# Patient Record
Sex: MALE | Race: WHITE | Employment: OTHER | URBAN - METROPOLITAN AREA
[De-identification: names, ages, dates, MRNs, and addresses within clinical notes are randomized per-mention and may not be internally consistent; named-entity substitution may affect disease eponyms.]

---

## 2022-02-24 ENCOUNTER — OUTSIDE SERVICES (OUTPATIENT)
Dept: NEUROLOGY | Age: 77
End: 2022-02-24
Payer: COMMERCIAL

## 2022-02-24 DIAGNOSIS — M54.16 LUMBAR RADICULOPATHY: ICD-10-CM

## 2022-02-24 PROCEDURE — 95886 MUSC TEST DONE W/N TEST COMP: CPT | Performed by: PSYCHIATRY & NEUROLOGY

## 2022-02-24 PROCEDURE — 95912 NRV CNDJ TEST 11-12 STUDIES: CPT | Performed by: PSYCHIATRY & NEUROLOGY

## 2023-04-12 ENCOUNTER — TELEPHONE (OUTPATIENT)
Dept: PRIMARY CARE | Facility: CLINIC | Age: 78
End: 2023-04-12
Payer: MEDICARE

## 2023-04-12 NOTE — TELEPHONE ENCOUNTER
Patient is out of medication.   Please assist. LVM on reception line.     CB to: [phone]   Thanks!

## 2023-04-24 RX ORDER — DICLOFENAC SODIUM 10 MG/G
GEL TOPICAL
COMMUNITY
Start: 2022-09-08 | End: 2023-12-14 | Stop reason: WASHOUT

## 2023-04-24 RX ORDER — LEVOFLOXACIN 250 MG/1
1 TABLET ORAL DAILY
COMMUNITY
Start: 2022-06-02 | End: 2023-06-07 | Stop reason: ALTCHOICE

## 2023-04-24 RX ORDER — CYCLOBENZAPRINE HCL 5 MG
TABLET ORAL 3 TIMES DAILY PRN
COMMUNITY
Start: 2022-12-14 | End: 2023-06-07 | Stop reason: ALTCHOICE

## 2023-04-24 RX ORDER — TAMSULOSIN HYDROCHLORIDE 0.4 MG/1
1 CAPSULE ORAL DAILY
COMMUNITY
Start: 2021-09-29 | End: 2023-06-07 | Stop reason: SDUPTHER

## 2023-04-24 RX ORDER — CARBIDOPA AND LEVODOPA 25; 100 MG/1; MG/1
TABLET ORAL
COMMUNITY
End: 2023-12-14 | Stop reason: WASHOUT

## 2023-04-24 RX ORDER — FINASTERIDE 5 MG/1
5 TABLET, FILM COATED ORAL DAILY
COMMUNITY
End: 2023-06-07 | Stop reason: SDUPTHER

## 2023-04-24 RX ORDER — MAGNESIUM HYDROXIDE 2400 MG/10ML
10 SUSPENSION ORAL NIGHTLY PRN
COMMUNITY
Start: 2022-12-14 | End: 2023-06-07 | Stop reason: ALTCHOICE

## 2023-04-24 RX ORDER — ALUMINUM HYDROXIDE, MAGNESIUM HYDROXIDE, AND SIMETHICONE 1200; 120; 1200 MG/30ML; MG/30ML; MG/30ML
30 SUSPENSION ORAL EVERY 6 HOURS PRN
COMMUNITY
Start: 2022-12-14 | End: 2023-06-07 | Stop reason: ALTCHOICE

## 2023-04-24 RX ORDER — DOCUSATE SODIUM 100 MG/1
100 CAPSULE, LIQUID FILLED ORAL 2 TIMES DAILY
COMMUNITY
End: 2023-06-07 | Stop reason: ALTCHOICE

## 2023-04-24 RX ORDER — OMEPRAZOLE 40 MG/1
CAPSULE, DELAYED RELEASE ORAL
COMMUNITY
End: 2023-06-07 | Stop reason: ALTCHOICE

## 2023-04-24 RX ORDER — TRAMADOL HYDROCHLORIDE 50 MG/1
1 TABLET ORAL
COMMUNITY
Start: 2023-01-04 | End: 2023-06-07 | Stop reason: ALTCHOICE

## 2023-04-24 RX ORDER — ENOXAPARIN SODIUM 100 MG/ML
40 INJECTION SUBCUTANEOUS
COMMUNITY
Start: 2022-06-02 | End: 2023-06-07 | Stop reason: ALTCHOICE

## 2023-04-24 RX ORDER — ACETAMINOPHEN 500 MG
1000 TABLET ORAL 3 TIMES DAILY
COMMUNITY
End: 2023-12-14 | Stop reason: WASHOUT

## 2023-04-24 RX ORDER — PANTOPRAZOLE SODIUM 40 MG/1
40 TABLET, DELAYED RELEASE ORAL DAILY
COMMUNITY
End: 2023-06-07 | Stop reason: ALTCHOICE

## 2023-04-24 RX ORDER — GABAPENTIN 100 MG/1
200 CAPSULE ORAL NIGHTLY
COMMUNITY
End: 2023-12-14 | Stop reason: WASHOUT

## 2023-04-24 RX ORDER — LIDOCAINE 50 MG/G
PATCH TOPICAL
COMMUNITY
Start: 2022-12-29 | End: 2023-06-07 | Stop reason: ALTCHOICE

## 2023-04-24 RX ORDER — RIVAROXABAN 10 MG/1
TABLET, FILM COATED ORAL
COMMUNITY
End: 2023-06-07 | Stop reason: ALTCHOICE

## 2023-04-24 RX ORDER — ACETAMINOPHEN 325 MG/1
TABLET, CHEWABLE ORAL EVERY 4 HOURS PRN
COMMUNITY
End: 2023-06-07 | Stop reason: ALTCHOICE

## 2023-04-24 RX ORDER — DOXAZOSIN 2 MG/1
1 TABLET ORAL DAILY
COMMUNITY
Start: 2019-03-20 | End: 2023-12-14 | Stop reason: WASHOUT

## 2023-05-22 RX ORDER — CARBIDOPA AND LEVODOPA 25; 100 MG/1; MG/1
TABLET ORAL
Qty: 135 TABLET | Refills: 0 | OUTPATIENT
Start: 2023-05-22

## 2023-05-22 RX ORDER — GABAPENTIN 100 MG/1
100 CAPSULE ORAL NIGHTLY
Qty: 60 CAPSULE | Refills: 0 | OUTPATIENT
Start: 2023-05-22

## 2023-05-22 RX ORDER — FINASTERIDE 5 MG/1
5 TABLET, FILM COATED ORAL DAILY
Qty: 30 TABLET | Refills: 0 | OUTPATIENT
Start: 2023-05-22

## 2023-05-22 NOTE — TELEPHONE ENCOUNTER
Pt did schedule follow up with you and per daughter in law (Dilma) you told her  you would fill these ?   I only did 30 day supply until appt  Please Advise

## 2023-05-31 ENCOUNTER — APPOINTMENT (OUTPATIENT)
Dept: PRIMARY CARE | Facility: CLINIC | Age: 78
End: 2023-05-31
Payer: MEDICARE

## 2023-06-07 ENCOUNTER — OFFICE VISIT (OUTPATIENT)
Dept: PRIMARY CARE | Facility: CLINIC | Age: 78
End: 2023-06-07
Payer: MEDICARE

## 2023-06-07 VITALS
BODY MASS INDEX: 25.63 KG/M2 | TEMPERATURE: 97 F | HEART RATE: 96 BPM | OXYGEN SATURATION: 98 % | SYSTOLIC BLOOD PRESSURE: 124 MMHG | WEIGHT: 154 LBS | DIASTOLIC BLOOD PRESSURE: 82 MMHG

## 2023-06-07 DIAGNOSIS — Z00.00 ROUTINE GENERAL MEDICAL EXAMINATION AT HEALTH CARE FACILITY: Primary | ICD-10-CM

## 2023-06-07 DIAGNOSIS — R53.83 OTHER FATIGUE: ICD-10-CM

## 2023-06-07 DIAGNOSIS — R29.2 HYPERREFLEXIA: ICD-10-CM

## 2023-06-07 DIAGNOSIS — R63.4 ABNORMAL WEIGHT LOSS: ICD-10-CM

## 2023-06-07 DIAGNOSIS — N13.8 BENIGN PROSTATIC HYPERPLASIA WITH URINARY OBSTRUCTION: ICD-10-CM

## 2023-06-07 DIAGNOSIS — G20.A1 PARKINSON'S DISEASE (MULTI): ICD-10-CM

## 2023-06-07 DIAGNOSIS — R97.20 ELEVATED PSA: ICD-10-CM

## 2023-06-07 DIAGNOSIS — N40.1 BENIGN PROSTATIC HYPERPLASIA WITH URINARY OBSTRUCTION: ICD-10-CM

## 2023-06-07 DIAGNOSIS — D50.8 OTHER IRON DEFICIENCY ANEMIA: ICD-10-CM

## 2023-06-07 DIAGNOSIS — D09.8 CARCINOMA IN SITU OF OTHER SPECIFIED SITES: ICD-10-CM

## 2023-06-07 DIAGNOSIS — Z23 NEED FOR VACCINATION: ICD-10-CM

## 2023-06-07 PROBLEM — R07.81 RIB PAIN ON RIGHT SIDE: Status: ACTIVE | Noted: 2023-06-07

## 2023-06-07 PROBLEM — N40.0 BENIGN PROSTATIC HYPERPLASIA: Status: ACTIVE | Noted: 2023-06-07

## 2023-06-07 PROBLEM — M25.511 RIGHT SHOULDER PAIN: Status: ACTIVE | Noted: 2023-06-07

## 2023-06-07 PROBLEM — D64.9 ANEMIA: Status: ACTIVE | Noted: 2023-06-07

## 2023-06-07 PROBLEM — M41.9 SCOLIOSIS: Status: ACTIVE | Noted: 2022-03-07

## 2023-06-07 PROBLEM — Z96.659 HISTORY OF TOTAL KNEE REPLACEMENT: Status: ACTIVE | Noted: 2022-08-03

## 2023-06-07 PROBLEM — R29.898 LEFT LEG WEAKNESS: Status: ACTIVE | Noted: 2023-06-07

## 2023-06-07 PROBLEM — N41.0 ACUTE PROSTATITIS: Status: ACTIVE | Noted: 2023-06-07

## 2023-06-07 PROBLEM — Z99.3 DEPENDENCE ON WHEELCHAIR: Status: ACTIVE | Noted: 2022-03-07

## 2023-06-07 PROBLEM — I74.9 THROMBOEMBOLISM (MULTI): Status: ACTIVE | Noted: 2023-06-07

## 2023-06-07 PROBLEM — D17.9 LIPOMA: Status: ACTIVE | Noted: 2023-06-07

## 2023-06-07 PROBLEM — E61.1 LOW IRON: Status: ACTIVE | Noted: 2023-06-07

## 2023-06-07 PROBLEM — M25.572 LEFT ANKLE PAIN: Status: ACTIVE | Noted: 2023-06-07

## 2023-06-07 PROBLEM — R26.81 GAIT INSTABILITY: Status: ACTIVE | Noted: 2023-06-07

## 2023-06-07 PROBLEM — S22.32XA FRACTURE OF RIB OF LEFT SIDE: Status: ACTIVE | Noted: 2023-06-07

## 2023-06-07 PROBLEM — T14.8XXA ABRASION: Status: ACTIVE | Noted: 2023-06-07

## 2023-06-07 PROBLEM — M17.11 OSTEOARTHRITIS OF RIGHT KNEE: Status: ACTIVE | Noted: 2022-07-12

## 2023-06-07 PROBLEM — L03.90 CELLULITIS: Status: ACTIVE | Noted: 2023-06-07

## 2023-06-07 PROBLEM — G25.81 RESTLESS LEGS SYNDROME: Status: ACTIVE | Noted: 2022-03-07

## 2023-06-07 PROBLEM — M47.12 CERVICAL SPONDYLOSIS WITH MYELOPATHY: Status: ACTIVE | Noted: 2022-04-18

## 2023-06-07 PROBLEM — R79.89 ELEVATED D-DIMER: Status: ACTIVE | Noted: 2023-06-07

## 2023-06-07 PROBLEM — R77.8 ABNORMAL SERUM PROTEIN TEST: Status: ACTIVE | Noted: 2023-06-07

## 2023-06-07 PROBLEM — M48.02 STENOSIS, CERVICAL SPINE: Status: ACTIVE | Noted: 2023-06-07

## 2023-06-07 PROBLEM — M48.061 SPINAL STENOSIS OF LUMBAR REGION: Status: ACTIVE | Noted: 2022-03-02

## 2023-06-07 PROBLEM — G95.89 MYELOMALACIA (MULTI): Status: ACTIVE | Noted: 2023-06-07

## 2023-06-07 PROBLEM — M54.50 LOW BACK PAIN: Status: ACTIVE | Noted: 2023-06-07

## 2023-06-07 PROBLEM — S22.49XA MULTIPLE RIB FRACTURES: Status: ACTIVE | Noted: 2023-06-07

## 2023-06-07 PROBLEM — M25.569 JOINT PAIN, KNEE: Status: ACTIVE | Noted: 2023-06-07

## 2023-06-07 PROBLEM — R26.9 GAIT DISTURBANCE: Status: ACTIVE | Noted: 2023-06-07

## 2023-06-07 PROBLEM — R20.0 LEFT LEG NUMBNESS: Status: ACTIVE | Noted: 2023-06-07

## 2023-06-07 PROCEDURE — 1160F RVW MEDS BY RX/DR IN RCRD: CPT | Performed by: FAMILY MEDICINE

## 2023-06-07 PROCEDURE — 1157F ADVNC CARE PLAN IN RCRD: CPT | Performed by: FAMILY MEDICINE

## 2023-06-07 PROCEDURE — 1036F TOBACCO NON-USER: CPT | Performed by: FAMILY MEDICINE

## 2023-06-07 PROCEDURE — G0439 PPPS, SUBSEQ VISIT: HCPCS | Performed by: FAMILY MEDICINE

## 2023-06-07 PROCEDURE — 1170F FXNL STATUS ASSESSED: CPT | Performed by: FAMILY MEDICINE

## 2023-06-07 PROCEDURE — 1159F MED LIST DOCD IN RCRD: CPT | Performed by: FAMILY MEDICINE

## 2023-06-07 PROCEDURE — 99214 OFFICE O/P EST MOD 30 MIN: CPT | Performed by: FAMILY MEDICINE

## 2023-06-07 RX ORDER — POLYETHYLENE GLYCOL 3350 17 G/17G
17 POWDER, FOR SOLUTION ORAL DAILY
COMMUNITY
End: 2023-12-14 | Stop reason: WASHOUT

## 2023-06-07 RX ORDER — FINASTERIDE 5 MG/1
5 TABLET, FILM COATED ORAL DAILY
Qty: 90 TABLET | Refills: 3 | Status: SHIPPED | OUTPATIENT
Start: 2023-06-07

## 2023-06-07 RX ORDER — CARBIDOPA AND LEVODOPA 25; 100 MG/1; MG/1
TABLET ORAL
Qty: 90 TABLET | Refills: 3 | Status: CANCELLED | OUTPATIENT
Start: 2023-06-07

## 2023-06-07 RX ORDER — HYDROGEN PEROXIDE 3 %
20 SOLUTION, NON-ORAL MISCELLANEOUS
COMMUNITY
End: 2023-12-14 | Stop reason: WASHOUT

## 2023-06-07 RX ORDER — LIDOCAINE 50 MG/G
1 PATCH TOPICAL DAILY
COMMUNITY
End: 2023-12-14 | Stop reason: WASHOUT

## 2023-06-07 RX ORDER — GABAPENTIN 100 MG/1
200 CAPSULE ORAL NIGHTLY
Qty: 180 CAPSULE | Refills: 3 | Status: CANCELLED | OUTPATIENT
Start: 2023-06-07

## 2023-06-07 RX ORDER — LANOLIN ALCOHOL/MO/W.PET/CERES
400 CREAM (GRAM) TOPICAL DAILY
Qty: 90 TABLET | Refills: 2 | Status: SHIPPED | OUTPATIENT
Start: 2023-06-07 | End: 2024-01-25 | Stop reason: SDUPTHER

## 2023-06-07 RX ORDER — SENNOSIDES 8.6 MG/1
1 TABLET ORAL DAILY
COMMUNITY
End: 2023-12-14 | Stop reason: WASHOUT

## 2023-06-07 RX ORDER — MELATONIN 3 MG
CAPSULE ORAL
COMMUNITY

## 2023-06-07 RX ORDER — TAMSULOSIN HYDROCHLORIDE 0.4 MG/1
0.4 CAPSULE ORAL DAILY
Qty: 90 CAPSULE | Refills: 3 | Status: SHIPPED | OUTPATIENT
Start: 2023-06-07

## 2023-06-07 ASSESSMENT — ENCOUNTER SYMPTOMS
TREMORS: 1
UNEXPECTED WEIGHT CHANGE: 1
DIFFICULTY URINATING: 1
OCCASIONAL FEELINGS OF UNSTEADINESS: 1
DEPRESSION: 0
BACK PAIN: 1
LOSS OF SENSATION IN FEET: 1

## 2023-06-07 ASSESSMENT — ACTIVITIES OF DAILY LIVING (ADL)
TAKING_MEDICATION: INDEPENDENT
MANAGING_FINANCES: INDEPENDENT
BATHING: INDEPENDENT
DOING_HOUSEWORK: INDEPENDENT
GROCERY_SHOPPING: INDEPENDENT
DRESSING: INDEPENDENT

## 2023-06-07 ASSESSMENT — PATIENT HEALTH QUESTIONNAIRE - PHQ9
2. FEELING DOWN, DEPRESSED OR HOPELESS: NOT AT ALL
1. LITTLE INTEREST OR PLEASURE IN DOING THINGS: NOT AT ALL
SUM OF ALL RESPONSES TO PHQ9 QUESTIONS 1 AND 2: 0
1. LITTLE INTEREST OR PLEASURE IN DOING THINGS: NOT AT ALL
2. FEELING DOWN, DEPRESSED OR HOPELESS: NOT AT ALL
SUM OF ALL RESPONSES TO PHQ9 QUESTIONS 1 AND 2: 0

## 2023-06-07 NOTE — ASSESSMENT & PLAN NOTE
Stop meds and off for 3 to 4 weeks and has tremor but able to control with his mind and wondering about taking meds advised to hold off until seen by neurology for

## 2023-06-07 NOTE — PATIENT INSTRUCTIONS
Please consider exercise program involving walking or some other form of aerobic activity 5 days weekly for 30 minutes... Let's also consider strengthening of large muscle groups like the abdominal muscles or shoulder muscles... Twice weekly with reps of 5/10/15 exercises and gradually increase strength.. This is not heavy strength training but light weight training... Sit ups or back exercise routine.. Please ask for handout if uncertain how to do..This  will help to strengthen your muscles which in turn will help you to lose weight.... You might ask what is the best diet available.. I would strongly encourage you to consider  Weight Watchers.. And as  your  fellow on  Weight Watchers physician attempting to  live this  LIFE  style  choice with you....  I will be glad to give you recommendations on what to eat.. Consider buying Migdalia bread.., trevor bagle thin bread.. oikos yogurt... eggs  to eat as hard-boiled... Halo top ice cream for snack... All these are delicious foods which.. when eaten and  being compliant eating three  meals daily  breakfast lunch and dinner, drinking  64 ounces of water daily we will all win together !!!!!!!. This will be a means for you to lose weight... Consider also the smart phone reina ... My plate.. Or My  fitness  pal..,  as additional possibilities for weight loss... Good  luccraig Loya!    Discussed medication side effects.  The  risk benefits and treatment options  discussed with patient.       Please schedule follow-up appointment based upon your improvement/failure to improve/chronic medical conditions and physician recommendations during office appointment at the .       Patient advised to go to er if symptoms worsen or to call answering service, or to return to office for additional evaluation    This note was partially  generated using Dragon voice recognition and there may be incorrect words, wording, spelling, or pronunciation errors that were not  corrected prior to committing the note to the medical record.   Review of testing 3/8/2022 shows CMP remarkable for slightly decreased protein of 5.6.  CBC with differential showed hemoglobin decreased at 12.4 hematocrit 36.7 WBC 6.7    CK3 2270 1 AM within normal limits    CT of the pelvis with IV contrast dated 12/22 showed marked enlargement again of the prostate measuring 8.8 x 8.0 cm with lobular contours and mass effect on the adjacent urinary bladder    CT of the head without contrast dated 8/22 showed mild prominence of the CSF space may be related to a small arachnoid cyst moderate generalized brain parenchymal volume loss with mildly enlarged ventricles patchy and confluent foci of white matter most likely moderate chronic microvascular angiopathy no acute intracranial abnormalities.    CT of the chest for pulmonary embolism dated 5/22 showed normal heart thoracic aorta normal small hiatal hernia mild thickening of distal esophagus fatty liver morphology thickening of the mid and distal esophagus

## 2023-06-07 NOTE — PROGRESS NOTES
"Subjective   Reason for Visit: Marco Antonio Merlos Jr. is an 78 y.o. male here for a Medicare Wellness visit.     Past Medical, Surgical, and Family History reviewed and updated in chart.    Reviewed all medications by prescribing practitioner or clinical pharmacist (such as prescriptions, OTCs, herbal therapies and supplements) and documented in the medical record.     management.  Consent now okay last PSA performed 9/21 with level of 16.  He states he has been on finasteride and it has helped.  Tremor    The tremor affects the left hand. The tremor occurs all day. The tremor is alleviated by resting the extremity.    Additional narrative: Had neurologist but did not keep appointment  He has BPH and finasteride has helped immensely.  Has not seen urology in a long time.    Patient Care Team:  Santhosh Loya DO as PCP - General  Santhosh Loya DO as PCP - [facility] Attributed Provider     Review of Systems   Constitutional:  Positive for unexpected weight change.   Genitourinary:  Positive for difficulty urinating.   Musculoskeletal:  Positive for back pain.   Neurological:  Positive for tremors.       Objective   Vitals:  /82   Pulse 96   Temp 36.1 °C (97 °F)   Resp (P) 18   Ht (P) 1.651 m (5' 5\")   Wt 69.9 kg (154 lb)   SpO2 98%   BMI (P) 25.63 kg/m²       Physical Exam  Vitals: I have reviewed the vitals  General: Well-developed.  In no acute distress.  Eyes:   sclera nonicteric.  Conjunctiva not injected.  No discharge.   HEAD: Normocephalic, atraumatic.  HEENT   Mucous membranes moist.  Posterior oropharynx nonerythematous, no tonsillar exudates.      No cervical lymphadenopathy.  Cardio: Regular rate and rhythm.  No murmur, rub or gallop.  Pulmonary: Lungs clear to auscultation in all fields.  No accessory muscle use.  GI/: Normal active bowel sounds.  Soft, nontender.  No masses or organomegaly appreciated.  Musculoskeletal: No gross deformities appreciated.  Neuro: Alert, age-appropriate.  " Normal muscle tone.  Moving all extremities.  Skin: No rash, bruises or lesions.     Review of testing 3/8/2022 shows CMP remarkable for slightly decreased protein of 5.6.  CBC with differential showed hemoglobin decreased at 12.4 hematocrit 36.7 WBC 6.7    CK3 2270 1 AM within normal limits    CT of the pelvis with IV contrast dated 12/22 showed marked enlargement again of the prostate measuring 8.8 x 8.0 cm with lobular contours and mass effect on the adjacent urinary bladder    CT of the head without contrast dated 8/22 showed mild prominence of the CSF space may be related to a small arachnoid cyst moderate generalized brain parenchymal volume loss with mildly enlarged ventricles patchy and confluent foci of white matter most likely moderate chronic microvascular angiopathy no acute intracranial abnormalities.    CT of the chest for pulmonary embolism dated 5/22 showed normal heart thoracic aorta normal small hiatal hernia mild thickening of distal esophagus fatty liver morphology thickening of the mid and distal esophagus  Assessment/Plan   Problem List Items Addressed This Visit          Nervous    Parkinson's disease (CMS/HCC)    Current Assessment & Plan     Stop meds and off for 3 to 4 weeks and has tremor but able to control with his mind and wondering about taking meds advised to hold off until seen by neurology for            Genitourinary    Benign prostatic hyperplasia    Elevated PSA    Current Assessment & Plan     Results of PET scan showed a large prostate 8 x 8 cm PSA elevated at 16 we will recheck PSA advised patient to see urology for work-up question prostate cancer            Hematologic    Anemia    Current Assessment & Plan     We will check iron studies anemia noted does have thickening noted on CAT scan of esophagus uncertain if malignancy will refer to GI for opinion          Other Visit Diagnoses       Routine general medical examination at health care facility    -  Primary    Abnormal  weight loss        Carcinoma in situ of other specified sites        Need for vaccination        Relevant Medications    zoster vaccine-recombinant adjuvanted (Shingrix) 50 mcg/0.5 mL vaccine        Muscle cramps prescription for magnesium sent to pharmacy prescription given for shingles vaccine

## 2023-06-07 NOTE — ASSESSMENT & PLAN NOTE
Results of PET scan showed a large prostate 8 x 8 cm PSA elevated at 16 we will recheck PSA advised patient to see urology for work-up question prostate cancer

## 2023-06-07 NOTE — ASSESSMENT & PLAN NOTE
We will check iron studies anemia noted does have thickening noted on CAT scan of esophagus uncertain if malignancy will refer to GI for opinion

## 2023-08-03 LAB — PROSTATE SPECIFIC AG (NG/ML) IN SER/PLAS: 8.57 NG/ML (ref 0–4)

## 2023-08-14 ENCOUNTER — TELEPHONE (OUTPATIENT)
Dept: PRIMARY CARE | Facility: CLINIC | Age: 78
End: 2023-08-14
Payer: MEDICARE

## 2023-08-22 ENCOUNTER — HOSPITAL ENCOUNTER (OUTPATIENT)
Dept: DATA CONVERSION | Facility: HOSPITAL | Age: 78
End: 2023-08-22
Attending: INTERNAL MEDICINE | Admitting: INTERNAL MEDICINE
Payer: MEDICARE

## 2023-08-22 DIAGNOSIS — R12 HEARTBURN: ICD-10-CM

## 2023-08-22 DIAGNOSIS — K44.9 DIAPHRAGMATIC HERNIA WITHOUT OBSTRUCTION OR GANGRENE: ICD-10-CM

## 2023-08-22 DIAGNOSIS — K21.00 GASTRO-ESOPHAGEAL REFLUX DISEASE WITH ESOPHAGITIS, WITHOUT BLEEDING: ICD-10-CM

## 2023-08-22 DIAGNOSIS — K31.89 OTHER DISEASES OF STOMACH AND DUODENUM: ICD-10-CM

## 2023-08-28 LAB
COMPLETE PATHOLOGY REPORT: NORMAL
CONVERTED CLINICAL DIAGNOSIS-HISTORY: NORMAL
CONVERTED FINAL DIAGNOSIS: NORMAL
CONVERTED FINAL REPORT PDF LINK TO COPY AND PASTE: NORMAL
CONVERTED GROSS DESCRIPTION: NORMAL

## 2023-09-29 VITALS — BODY MASS INDEX: 27.59 KG/M2 | WEIGHT: 149.91 LBS | HEIGHT: 62 IN

## 2023-11-22 PROBLEM — Z96.651 STATUS POST TOTAL RIGHT KNEE REPLACEMENT: Status: ACTIVE | Noted: 2022-08-03

## 2023-11-22 PROBLEM — M62.81 QUADRICEPS WEAKNESS: Status: ACTIVE | Noted: 2023-11-22

## 2023-11-22 PROBLEM — M48.061 LUMBAR SPINAL STENOSIS: Status: ACTIVE | Noted: 2023-11-22

## 2023-11-22 PROBLEM — S72.002A HIP FRACTURE, LEFT (MULTI): Status: ACTIVE | Noted: 2023-11-22

## 2023-11-22 PROBLEM — R13.10 DYSPHAGIA: Status: ACTIVE | Noted: 2023-11-22

## 2023-11-22 PROBLEM — M54.9 BACK PAIN: Status: ACTIVE | Noted: 2023-06-07

## 2023-11-22 PROBLEM — R93.3 ABNORMAL CT SCAN, ESOPHAGUS: Status: ACTIVE | Noted: 2023-11-22

## 2023-11-22 RX ORDER — OMEPRAZOLE 40 MG/1
CAPSULE, DELAYED RELEASE ORAL
COMMUNITY
Start: 2023-08-31 | End: 2023-12-14 | Stop reason: WASHOUT

## 2023-11-22 RX ORDER — MIRABEGRON 50 MG/1
1 TABLET, EXTENDED RELEASE ORAL DAILY
COMMUNITY
Start: 2023-08-03 | End: 2023-12-14 | Stop reason: WASHOUT

## 2023-11-22 RX ORDER — GABAPENTIN 300 MG/1
CAPSULE ORAL
COMMUNITY
End: 2023-12-14 | Stop reason: WASHOUT

## 2023-11-22 RX ORDER — RASAGILINE 1 MG/1
1 TABLET ORAL DAILY
COMMUNITY
Start: 2023-08-17 | End: 2023-12-14 | Stop reason: WASHOUT

## 2023-11-22 RX ORDER — TRAMADOL HYDROCHLORIDE 50 MG/1
1 TABLET ORAL EVERY 8 HOURS PRN
COMMUNITY
Start: 2023-01-04 | End: 2023-12-14 | Stop reason: WASHOUT

## 2023-12-14 ENCOUNTER — OFFICE VISIT (OUTPATIENT)
Dept: NEUROLOGY | Facility: CLINIC | Age: 78
End: 2023-12-14
Payer: MEDICARE

## 2023-12-14 VITALS
WEIGHT: 153 LBS | SYSTOLIC BLOOD PRESSURE: 119 MMHG | HEART RATE: 94 BPM | HEIGHT: 63 IN | DIASTOLIC BLOOD PRESSURE: 83 MMHG | BODY MASS INDEX: 27.11 KG/M2

## 2023-12-14 DIAGNOSIS — M48.061 SPINAL STENOSIS, LUMBAR REGION WITHOUT NEUROGENIC CLAUDICATION: ICD-10-CM

## 2023-12-14 DIAGNOSIS — G20.A1 PARKINSON'S DISEASE WITHOUT DYSKINESIA OR FLUCTUATING MANIFESTATIONS (MULTI): Primary | ICD-10-CM

## 2023-12-14 DIAGNOSIS — R20.0 LEFT LEG NUMBNESS: ICD-10-CM

## 2023-12-14 PROCEDURE — 1036F TOBACCO NON-USER: CPT | Performed by: STUDENT IN AN ORGANIZED HEALTH CARE EDUCATION/TRAINING PROGRAM

## 2023-12-14 PROCEDURE — 1160F RVW MEDS BY RX/DR IN RCRD: CPT | Performed by: STUDENT IN AN ORGANIZED HEALTH CARE EDUCATION/TRAINING PROGRAM

## 2023-12-14 PROCEDURE — 1125F AMNT PAIN NOTED PAIN PRSNT: CPT | Performed by: STUDENT IN AN ORGANIZED HEALTH CARE EDUCATION/TRAINING PROGRAM

## 2023-12-14 PROCEDURE — 99214 OFFICE O/P EST MOD 30 MIN: CPT | Performed by: STUDENT IN AN ORGANIZED HEALTH CARE EDUCATION/TRAINING PROGRAM

## 2023-12-14 PROCEDURE — 1159F MED LIST DOCD IN RCRD: CPT | Performed by: STUDENT IN AN ORGANIZED HEALTH CARE EDUCATION/TRAINING PROGRAM

## 2023-12-14 RX ORDER — DULOXETIN HYDROCHLORIDE 30 MG/1
CAPSULE, DELAYED RELEASE ORAL
Qty: 30 CAPSULE | Refills: 2 | Status: SHIPPED | OUTPATIENT
Start: 2023-12-14 | End: 2024-04-19

## 2023-12-14 ASSESSMENT — PATIENT HEALTH QUESTIONNAIRE - PHQ9
1. LITTLE INTEREST OR PLEASURE IN DOING THINGS: SEVERAL DAYS
SUM OF ALL RESPONSES TO PHQ9 QUESTIONS 1 & 2: 2
10. IF YOU CHECKED OFF ANY PROBLEMS, HOW DIFFICULT HAVE THESE PROBLEMS MADE IT FOR YOU TO DO YOUR WORK, TAKE CARE OF THINGS AT HOME, OR GET ALONG WITH OTHER PEOPLE: SOMEWHAT DIFFICULT
2. FEELING DOWN, DEPRESSED OR HOPELESS: SEVERAL DAYS

## 2023-12-14 ASSESSMENT — ENCOUNTER SYMPTOMS
DEPRESSION: 1
OCCASIONAL FEELINGS OF UNSTEADINESS: 1
LOSS OF SENSATION IN FEET: 1

## 2023-12-14 NOTE — PROGRESS NOTES
"Subjective     Marco Antonio Merlos Jr. is a right handed  78 y.o. year old male who presents for 3 month f/u of multifactorial gait d/o (R knee arthritis, cervical and lumbar stenosis, neuropathy, and PD. Patient is accompanied by: child (his son), who helps provide collateral.    Visit type: follow up visit     HPI  Last seen in clinic 8/17/23. At this time, pt had stopped carbidopa/levodopa d/t dizziness and falls and was supposed to be started on Rasagline 1mg QAM. Pt's son states that he does not recall the rasagaline and pt has instead been off PD medications completely. Endorses a minor L hand tremor that he can suppress with effort and does not bother him. His walk is steadier, last fall was the previous Spring when he broke his hip. Uses a walker to ambulate. Not currently interested in starting any PD medications at this time.    Feels that his dizziness/LH has improved, though still endorses occasional short episodes when he stands up from bed in the morning. Does not drink enough water during the day. Denies any constipation, REM sleep behavior disorder (though sleeps alone, so not sure). Does some very mild exercise occasionally. His biggest complaint currently is pins and needles sensation in his b/l feet that cause difficulties walking and sleeping.    Past Hx:   Has had walking problems for several years, shuffling gait since 2020 and intermittent falls, with right knee giving out. His right knee is severely painful and \"feels loose.\" In March 2022 seen by Dr. Cantor and reported worsening of gait for the last 2 months with a relatively fast decline, using a walker the more recently wheelchair-bound for the past month - he does not think he can walk even with walker. He also reported micrographia, LH tremor for a couple years as well as hyposmia.    Had cervical and thoracic surgery in May 2022 at [facility] and a R knee replacement in Aug 2022. Then he suffered a L hip fx requiring surgery in 12/2022 after a " fall when turning in the kitchen. He did physical therapy and can walk with the walker but not well due to balance issues. PCP took him off gabapentin for neuropathy in case that was contributing to balance issues.     Stopped his carbidopa/levodopa before clinic visit in 8/17/23 d/t dizziness, has not been on PD medications since then.    EMG of BLE Feb 2022 was reported normal  MRI L-spine Feb 2022 showed extensive spondylitic changes: multi-level canal stenosis, moderate at worst, scoliosis, and spondylolisthesis    Patient Health Questionnaire-2 Score: 2        Review of Systems  All other system have been reviewed and are negative for complaint.    Objective   Visit Vitals  /83   Pulse 94      Neurological Exam  Mental Status  Awake, alert and oriented to person, place and time. Speech: Hypophonic.    Cranial Nerves  CN III, IV, VI: Extraocular movements intact bilaterally. Pupils equal round and reactive to light bilaterally.  CN V: Facial sensation is normal.  CN VII: Full and symmetric facial movement.  CN VIII:  Right: Hearing is decreased. Pt not wearing his hearing aids. .  Left: Hearing is decreased.  CN IX, X: Palate elevates symmetrically  CN XI: Shoulder shrug strength is normal.  CN XII: Tongue midline without atrophy or fasciculations.    Motor                                               Right                     Left   Shoulder abduction               5                          5  Elbow flexion                         5                          5  Elbow extension                    5                          5  Wrist extension                      5                          5  Hip flexion                              5                          5-  Knee flexion                           5                          5  Knee extension                      5                          5  Plantarflexion                         5                          5  Dorsiflexion                            5                           5  *Note, pt had previous hip fracture on L    Bradykinesia on finger-tapping, hand-opening- bradykinesia (L worse than R), decrementation on finger-tapping  L hand resting tremor, no gross postural or intention tremor  Bradykinesia on foot tapping b/l.    Sensory  Grossly normal, pt notes neuropathy BLE.     Reflexes                                            Right                      Left  Brachioradialis                    2+                         2+  Biceps                                 2+                         2+  Patellar                                2+                         3+  Achilles                                1+                         1+    Right pathological reflexes: Suprapatellar absent.  Left pathological reflexes: Suprapatellar present.    Coordination  Right: Finger-to-nose normal.Left: Finger-to-nose normal.    Gait   Abnormal pull test. Unable to rise from chair without using arms.  Required use of armrests to stand, stance unsteady. Took few shuffling steps with wide base, but cannot walk unassisted without walker.      Physical Exam  Eyes:      Extraocular Movements: Extraocular movements intact.      Pupils: Pupils are equal, round, and reactive to light.   Neurological:      Deep Tendon Reflexes:      Reflex Scores:       Bicep reflexes are 2+ on the right side and 2+ on the left side.       Brachioradialis reflexes are 2+ on the right side and 2+ on the left side.       Patellar reflexes are 2+ on the right side and 3+ on the left side.       Achilles reflexes are 1+ on the right side and 1+ on the left side.      Assessment/Plan   Marco Antonio Merlos Jr. is a right handed  78 y.o. year old male who presents for 3 month f/u of multifactorial gait d/o (R knee arthritis, cervical and lumbar stenosis, neuropathy, and PD. Has been off PD medications since 8/2023, not bothered by his LUE tremor or other PD symptoms.  As his biggest concern is difficulty walking  and states that his neuropathy is the biggest contributor, will start with Duloxetine and reassess need for PD medication at next visit, such as Rytary.    PLAN:  - Duloxetine 30mg daily for one week, if tolerates can raise to 60mg  - MOCA next visit

## 2023-12-14 NOTE — PATIENT INSTRUCTIONS
Thank you for coming to see us today!    You currently aren't on any medications for you Parkinson's disease but feel that your symptoms are stable and not bothering you too much at this time, so we will keep you off medicines for now and discuss at future visits. At your next visit, we will test your memory.    Since the neuropathy in your feet is bothering you and making it difficult to walk, we will start you on a different medication called duloxetine. Start with 30mg once a day for a week, and if you tolerate this dose you can increase to 60mg once a day. If this doesn't work, we can consider trying a topical numbing treatment (lidocaine).      We recommend drinking more water (5-6 cups a day) and adding more salt to your diet. We also recommend more exercise through out the day.      Come back to see us in 3 months.

## 2023-12-27 ENCOUNTER — TELEPHONE (OUTPATIENT)
Dept: NEUROSURGERY | Facility: CLINIC | Age: 78
End: 2023-12-27
Payer: MEDICARE

## 2024-01-25 DIAGNOSIS — R53.83 OTHER FATIGUE: ICD-10-CM

## 2024-01-25 RX ORDER — LANOLIN ALCOHOL/MO/W.PET/CERES
400 CREAM (GRAM) TOPICAL DAILY
Qty: 90 TABLET | Refills: 2 | Status: SHIPPED | OUTPATIENT
Start: 2024-01-25 | End: 2025-01-24

## 2024-03-14 ENCOUNTER — APPOINTMENT (OUTPATIENT)
Dept: NEUROLOGY | Facility: CLINIC | Age: 79
End: 2024-03-14
Payer: MEDICARE

## 2024-05-21 ENCOUNTER — TELEPHONE (OUTPATIENT)
Dept: PRIMARY CARE | Facility: CLINIC | Age: 79
End: 2024-05-21

## 2024-05-21 ENCOUNTER — OFFICE VISIT (OUTPATIENT)
Dept: PRIMARY CARE | Facility: CLINIC | Age: 79
End: 2024-05-21
Payer: MEDICARE

## 2024-05-21 VITALS
SYSTOLIC BLOOD PRESSURE: 144 MMHG | HEIGHT: 63 IN | TEMPERATURE: 97 F | RESPIRATION RATE: 18 BRPM | DIASTOLIC BLOOD PRESSURE: 87 MMHG | HEART RATE: 94 BPM | OXYGEN SATURATION: 95 % | WEIGHT: 150 LBS | BODY MASS INDEX: 26.58 KG/M2

## 2024-05-21 DIAGNOSIS — G20.A1 PARKINSON'S DISEASE WITHOUT DYSKINESIA OR FLUCTUATING MANIFESTATIONS (MULTI): ICD-10-CM

## 2024-05-21 DIAGNOSIS — J96.01 ACUTE RESPIRATORY FAILURE WITH HYPOXIA (MULTI): ICD-10-CM

## 2024-05-21 DIAGNOSIS — G95.89 MYELOMALACIA (MULTI): ICD-10-CM

## 2024-05-21 DIAGNOSIS — D68.9 COAGULATION DEFECT, UNSPECIFIED (MULTI): ICD-10-CM

## 2024-05-21 DIAGNOSIS — I74.9 THROMBOEMBOLISM (MULTI): ICD-10-CM

## 2024-05-21 DIAGNOSIS — N41.0 ACUTE PROSTATITIS: Primary | ICD-10-CM

## 2024-05-21 LAB
BACTERIA #/AREA URNS AUTO: ABNORMAL /HPF
POC APPEARANCE, URINE: CLEAR
POC BILIRUBIN, URINE: NEGATIVE
POC BLOOD, URINE: ABNORMAL
POC COLOR, URINE: YELLOW
POC GLUCOSE, URINE: NEGATIVE MG/DL
POC HEMOGLOBIN: 13.4 G/DL (ref 13.5–17.5)
POC KETONES, URINE: NEGATIVE MG/DL
POC LEUKOCYTES, URINE: ABNORMAL
POC NITRITE,URINE: POSITIVE
POC PH, URINE: 5.5 PH
POC PROTEIN, URINE: NEGATIVE MG/DL
POC SPECIFIC GRAVITY, URINE: 1.02
POC UROBILINOGEN, URINE: 0.2 EU/DL
RBC #/AREA URNS AUTO: ABNORMAL /HPF
WBC #/AREA URNS AUTO: ABNORMAL /HPF

## 2024-05-21 PROCEDURE — 1036F TOBACCO NON-USER: CPT | Performed by: FAMILY MEDICINE

## 2024-05-21 PROCEDURE — 87086 URINE CULTURE/COLONY COUNT: CPT

## 2024-05-21 PROCEDURE — 81003 URINALYSIS AUTO W/O SCOPE: CPT | Performed by: FAMILY MEDICINE

## 2024-05-21 PROCEDURE — 1159F MED LIST DOCD IN RCRD: CPT | Performed by: FAMILY MEDICINE

## 2024-05-21 PROCEDURE — 85018 HEMOGLOBIN: CPT | Performed by: FAMILY MEDICINE

## 2024-05-21 PROCEDURE — 87186 SC STD MICRODIL/AGAR DIL: CPT

## 2024-05-21 PROCEDURE — 96372 THER/PROPH/DIAG INJ SC/IM: CPT | Performed by: FAMILY MEDICINE

## 2024-05-21 PROCEDURE — 99214 OFFICE O/P EST MOD 30 MIN: CPT | Performed by: FAMILY MEDICINE

## 2024-05-21 PROCEDURE — 81001 URINALYSIS AUTO W/SCOPE: CPT

## 2024-05-21 PROCEDURE — 1157F ADVNC CARE PLAN IN RCRD: CPT | Performed by: FAMILY MEDICINE

## 2024-05-21 RX ORDER — SULFAMETHOXAZOLE AND TRIMETHOPRIM 800; 160 MG/1; MG/1
1 TABLET ORAL 2 TIMES DAILY
Qty: 60 TABLET | Refills: 2 | Status: SHIPPED | OUTPATIENT
Start: 2024-05-21 | End: 2024-06-20

## 2024-05-21 ASSESSMENT — ENCOUNTER SYMPTOMS
SWEATS: 0
VOMITING: 0
FREQUENCY: 1
CHILLS: 1
HEMATURIA: 1
FLANK PAIN: 1

## 2024-05-21 NOTE — ASSESSMENT & PLAN NOTE
I believe you have chronic prostate or prostate infection and will do urine culture and check and see if there is bacteria.  In light of the fact that your blood in your urine I am suspicious for infection along with the chills and shaking.  We will give you a shot of antibiotics right now and then take the oral antibiotics for 30 days.  I want you to get some blood work done.  I have put an order in the chart.  If you do not get the things done still see me in 30 days otherwise prostate

## 2024-05-21 NOTE — TELEPHONE ENCOUNTER
Per Dr. Loya - to office for appointment /evaluation.  Dilma has been notified and will call back if she can bring in

## 2024-05-21 NOTE — PROGRESS NOTES
Subjective   Patient ID: Marco Antonio Gordillo Jr. is a 78 y.o. male who presents for UTI.  UTI   This is a new problem. The current episode started yesterday. The problem occurs intermittently. The problem has been unchanged. The patient is experiencing no pain. There has been no fever. He is Not sexually active. There is No history of pyelonephritis. Associated symptoms include chills, flank pain, frequency, hematuria and urgency. Pertinent negatives include no sweats or vomiting. He has tried nothing for the symptoms.       Review of Systems   Constitutional:  Positive for chills.   Gastrointestinal:  Negative for vomiting.   Genitourinary:  Positive for flank pain, frequency, hematuria and urgency.       Objective   Physical Exam  Vitals: I have reviewed the vitals  General: Well-developed.  In no acute distress.   Looks  pale  Eyes:   sclera nonicteric.  Conjunctiva not injected.  No discharge.   HEAD: Normocephalic, atraumatic.  HEENT   Mucous membranes moist.  Posterior oropharynx nonerythematous, no tonsillar exudates.      No cervical lymphadenopathy.  Cardio: Regular rate and rhythm.  No murmur, rub or gallop.  Pulmonary: Lungs clear to auscultation in all fields.  No accessory muscle use.  Abdomen   ..   Back   with tenderness  of lumbar  spine   GI/: Normal active bowel sounds.  Soft, nontender.  No masses or organomegaly appreciated.  Musculoskeletal: No gross deformities appreciated.  Neuro: Alert, age-appropriate.  Normal muscle tone.  Moving all extremities.  Skin: No rash, bruises or lesions.   Labs    CT pelvis wo IV contrast  Status: Final result     PACS Images     Show images for CT pelvis wo IV contrast  Signed by    Signed Time Phone Pager   Caron Baxter DO 12/10/2022 19:28 [phone]     Exam Information    Status Exam Begun Exam Ended   Final 12/10/2022 18:41 12/10/2022 18:53     Study Result    Narrative & Impression   MRN: [id number]  Patient Name: MARCO ANTONIO GORDILLO     STUDY:  CT PELVIS  W/O CONTRAST;  12/10/2022 6:53 pm     INDICATION:  Fall left hip pain .     COMPARISON:  CT abdomen pelvis 10/29/2015     ACCESSION NUMBER(S):  [id number]     ORDERING CLINICIAN:  MORRO BACON     TECHNIQUE:  CT of the pelvis was performed. Contiguous axial images were obtained  at 2 mm slice thickness through the pelvis. Coronal and sagittal  reconstructions  were performed.  3D reconstructions were performed  on an independent workstation and provided for review.     FINDINGS:  Bones:     Impacted acute left intertrochanteric, comminuted fracture of the  left hip with foreshortening and surrounding hematoma and swelling.     Bones are diffusely demineralized. The right hip appears intact.  Partial ankylosis of the left sacroiliac joint. Degenerative changes  within the lower or lumbar spine with mild vertebral body height loss  of L4 and L5. Grade 1/2 anterolisthesis of L5 on S1 measuring up to  10 mm with chronic appearing pars defects and severe foraminal  stenosis. Few scattered sclerotic foci noted measuring up to 8 mm in  the left pelvis.     Other:     There is marked enlargement again noted of the prostate measuring up  to 8.8 by 8.0 cm with lobular contours and mass effect on the  adjacent urinary bladder.     Mild wall thickening of the urinary bladder which could be related to  under distension and chronic outlet obstruction.     Significant stool burden visualized.     No free air, fluid collection or significant ascites identified.     Scattered atherosclerotic calcifications.     IMPRESSION:  1.  Acute impacted left intertrochanteric hip fracture.  2. Grade 1/grade 2 anterolisthesis of L5-S1 with pars defects.  3. 8.8 cm prostate is similar prior imaging however correlation with  PSA recommended.  4. Diffuse osseous demineralization and additional findings.       Assessment/Plan   Problem List Items Addressed This Visit       Acute prostatitis - Primary     I believe you have chronic prostate or  prostate infection and will do urine culture and check and see if there is bacteria.  In light of the fact that your blood in your urine I am suspicious for infection along with the chills and shaking.  We will give you a shot of antibiotics right now and then take the oral antibiotics for 30 days.  I want you to get some blood work done.  I have put an order in the chart.  If you do not get the things done still see me in 30 days otherwise prostate         Relevant Medications    cefTRIAXone (Rocephin) 1,000 mg in lidocaine (Xylocaine) 4 mL injection (Start on 5/21/2024  2:15 PM)    sulfamethoxazole-trimethoprim (Bactrim DS) 800-160 mg tablet    Other Relevant Orders    POCT UA Automated manually resulted    Urine Culture    Microscopic Only, Urine    POCT hemoglobin manually resulted    CBC and Auto Differential    Comprehensive Metabolic Panel    Thromboembolism (Multi)    Parkinson disease (Multi)    Myelomalacia (Multi)    Acute respiratory failure with hypoxia (Multi)    Coagulation defect, unspecified (Multi)   Urine testing  showed   leukocte/  nitrate positive   Hemacue       13.4

## 2024-05-21 NOTE — PATIENT INSTRUCTIONS
Please consider exercise program involving walking or some other form of aerobic activity 5 days weekly for 30 minutes... Let's also consider strengthening of large muscle groups like the abdominal muscles or shoulder muscles... Twice weekly with reps of 5/10/15 exercises and gradually increase strength.. This is not heavy strength training but light weight training... Sit ups or back exercise routine.. Please ask for handout if uncertain how to do..This  will help to strengthen your muscles which in turn will help you to lose weight.... You might ask what is the best diet available.. I would strongly encourage you to consider  Weight Watchers.. And as  your  fellow on  Weight Watchers physician attempting to  live this  LIFE  style  choice with you....  I will be glad to give you recommendations on what to eat.. Consider buying Migdalia bread.., trevor bagle thin bread.. oikos yogurt... eggs  to eat as hard-boiled... Halo top ice cream for snack... All these are delicious foods which.. when eaten and  being compliant eating three  meals daily  breakfast lunch and dinner, drinking  64 ounces of water daily we will all win together !!!!!!!. This will be a means for you to lose weight... Consider also the smart phone reina ... My plate.. Or My  fitness  pal..,  as additional possibilities for weight loss... Good  luccraig Loya!    Discussed medication side effects.  The  risk benefits and treatment options  discussed with patient.       Please schedule follow-up appointment based upon your improvement/failure to improve/chronic medical conditions and physician recommendations during office appointment at the .       Patient advised to go to er if symptoms worsen or to call answering service, or to return to office for additional evaluation    This note was partially  generated using Dragon voice recognition and there may be incorrect words, wording, spelling, or pronunciation errors that were not  corrected prior to committing the note to the medical record.        I believe you have chronic prostate or prostate infection and will do urine culture and check and see if there is bacteria.  In light of the fact that your blood in your urine I am suspicious for infection along with the chills and shaking.  We will give you a shot of antibiotics right now and then take the oral antibiotics for 30 days.  I want you to get some blood work done.  I have put an order in the chart.  If you do not get the things done still see me in 30 days otherwise prostate

## 2024-05-21 NOTE — TELEPHONE ENCOUNTER
Dilma asking if she can drop off urine for pt and do possible Virtual as she thinks he has UTI  Sx's are Blood, shaking (unsure if it's from his Parkinsons) and chills.   Or would he need to come in for appointment  Please Advise

## 2024-05-24 LAB — BACTERIA UR CULT: ABNORMAL

## 2024-05-31 ENCOUNTER — HOME HEALTH ADMISSION (OUTPATIENT)
Dept: HOME HEALTH SERVICES | Facility: HOME HEALTH | Age: 79
End: 2024-05-31
Payer: MEDICARE

## 2024-05-31 ENCOUNTER — DOCUMENTATION (OUTPATIENT)
Dept: HOME HEALTH SERVICES | Facility: HOME HEALTH | Age: 79
End: 2024-05-31
Payer: MEDICARE

## 2024-05-31 NOTE — HH CARE COORDINATION
Home Care received a Referral for Nursing, Physical Therapy, Occupational Therapy, and Home Health Aide. We have processed the referral for a Start of Care on 6/1-6/2.     If you have any questions or concerns, please feel free to contact us at [phone]. Follow the prompts, enter your five digit zip code, and you will be directed to your care team on WEST 2.

## 2024-06-02 ENCOUNTER — HOME CARE VISIT (OUTPATIENT)
Dept: HOME HEALTH SERVICES | Facility: HOME HEALTH | Age: 79
End: 2024-06-02
Payer: MEDICARE

## 2024-06-02 VITALS
BODY MASS INDEX: 26.66 KG/M2 | OXYGEN SATURATION: 95 % | HEIGHT: 65 IN | DIASTOLIC BLOOD PRESSURE: 86 MMHG | RESPIRATION RATE: 20 BRPM | HEART RATE: 86 BPM | SYSTOLIC BLOOD PRESSURE: 132 MMHG | WEIGHT: 160 LBS | TEMPERATURE: 99.1 F

## 2024-06-02 PROCEDURE — 0023 HH SOC

## 2024-06-02 PROCEDURE — 169592 NO-PAY CLAIM PROCEDURE

## 2024-06-02 PROCEDURE — G0299 HHS/HOSPICE OF RN EA 15 MIN: HCPCS | Mod: HHH

## 2024-06-02 PROCEDURE — 1090000001 HH PPS REVENUE CREDIT

## 2024-06-02 PROCEDURE — 1090000002 HH PPS REVENUE DEBIT

## 2024-06-02 ASSESSMENT — ACTIVITIES OF DAILY LIVING (ADL)
TOILETING: 1
BATHING EQUIPMENT USED: GRAB BAR
CURRENT_FUNCTION: ONE PERSON
ENTERING_EXITING_HOME: MODERATE ASSIST
AMBULATION ASSISTANCE: ONE PERSON
OASIS_M1830: 03
BATHING_CURRENT_FUNCTION: ONE PERSON
BATHING ASSESSED: 1
PHYSICAL TRANSFERS ASSESSED: 1
AMBULATION ASSISTANCE: 1
TOILETING: ONE PERSON

## 2024-06-02 ASSESSMENT — ENCOUNTER SYMPTOMS
HEADACHES: 1
DENIES PAIN: 1
LAST BOWEL MOVEMENT: 66991
MUSCLE WEAKNESS: 1
APPETITE LEVEL: GOOD

## 2024-06-03 ENCOUNTER — HOME CARE VISIT (OUTPATIENT)
Dept: HOME HEALTH SERVICES | Facility: HOME HEALTH | Age: 79
End: 2024-06-03
Payer: MEDICARE

## 2024-06-03 VITALS
DIASTOLIC BLOOD PRESSURE: 76 MMHG | TEMPERATURE: 98.6 F | SYSTOLIC BLOOD PRESSURE: 148 MMHG | RESPIRATION RATE: 14 BRPM | OXYGEN SATURATION: 96 % | HEART RATE: 102 BPM

## 2024-06-03 PROCEDURE — G0151 HHCP-SERV OF PT,EA 15 MIN: HCPCS | Mod: HHH

## 2024-06-03 PROCEDURE — 1090000001 HH PPS REVENUE CREDIT

## 2024-06-03 PROCEDURE — 1090000002 HH PPS REVENUE DEBIT

## 2024-06-03 SDOH — HEALTH STABILITY: PHYSICAL HEALTH: EXERCISE ACTIVITY: MARCHING

## 2024-06-03 SDOH — HEALTH STABILITY: PHYSICAL HEALTH: EXERCISE ACTIVITIES SETS: 1

## 2024-06-03 SDOH — HEALTH STABILITY: PHYSICAL HEALTH: EXERCISE ACTIVITY: LAQ

## 2024-06-03 SDOH — HEALTH STABILITY: PHYSICAL HEALTH: PHYSICAL EXERCISE: 15

## 2024-06-03 SDOH — HEALTH STABILITY: PHYSICAL HEALTH: EXERCISE ACTIVITY: ANKLE DF/PF

## 2024-06-03 SDOH — HEALTH STABILITY: PHYSICAL HEALTH: PHYSICAL EXERCISE: SEATED

## 2024-06-03 SDOH — HEALTH STABILITY: PHYSICAL HEALTH: EXERCISE TYPE: LE EXERCISES

## 2024-06-03 ASSESSMENT — ACTIVITIES OF DAILY LIVING (ADL)
AMBULATION ASSISTANCE: ONE PERSON
AMBULATION_DISTANCE/DURATION_TOLERATED: 20 FT
CURRENT_FUNCTION: ONE PERSON
AMBULATION ASSISTANCE ON FLAT SURFACES: 1

## 2024-06-03 ASSESSMENT — BALANCE ASSESSMENTS
BALANCE SCORE: 4
TURNING 360 DEGREES STEPS: 0 - DISCONTINUOUS STEPS
SITTING DOWN: 1 - USES ARMS OR NOT SMOOTH MOTION
ARISES: 0 - UNABLE WITHOUT HELP
SITTING BALANCE: 1 - STEADY, SAFE
STANDING BALANCE: 0 - UNSTEADY
EYES CLOSED AT MAXIMUM POSITION NUDGED: 0 - UNSTEADY
ATTEMPTS TO ARISE: 1 - ABLE, REQUIRES MORE THAN ONE ATTEMPT
IMMEDIATE STANDING BALANCE FIRST 5 SECONDS: 1 - STEADY BUT USES WALKER OR OTHER SUPPORT
NUDGED: 0 - BEGINS TO FALL
ARISING SCORE: 0
NUDGED SCORE: 0

## 2024-06-03 ASSESSMENT — ENCOUNTER SYMPTOMS
OCCASIONAL FEELINGS OF UNSTEADINESS: 1
DENIES PAIN: 1
PERSON REPORTING PAIN: PATIENT
MUSCLE WEAKNESS: 1

## 2024-06-03 ASSESSMENT — GAIT ASSESSMENTS
BALANCE AND GAIT SCORE: 7
TRUNK SCORE: 0
PATH SCORE: 0
WALKING STANCE: 1 - HEELS ALMOST TOUCHING WHILE WALKING
PATH: 0 - MARKED DEVIATION
GAIT SCORE: 3
STEP CONTINUITY: 0 - STOPPING OR DISCONTINUITY BETWEEN STEPS
STEP SYMMETRY: 0 - RIGHT AND LEFT STEP LENGTH NOT EQUAL
INITIATION OF GAIT IMMEDIATELY AFTER GO: 0 - ANY HESITANCY OR MULTIPLE ATTEMPTS TO START
TRUNK: 0 - MARKED SWAY OR USES WALKING AID

## 2024-06-04 ENCOUNTER — HOME CARE VISIT (OUTPATIENT)
Dept: HOME HEALTH SERVICES | Facility: HOME HEALTH | Age: 79
End: 2024-06-04
Payer: MEDICARE

## 2024-06-04 ENCOUNTER — PATIENT OUTREACH (OUTPATIENT)
Dept: PRIMARY CARE | Facility: CLINIC | Age: 79
End: 2024-06-04
Payer: MEDICARE

## 2024-06-04 VITALS
DIASTOLIC BLOOD PRESSURE: 62 MMHG | RESPIRATION RATE: 18 BRPM | OXYGEN SATURATION: 97 % | HEART RATE: 94 BPM | SYSTOLIC BLOOD PRESSURE: 112 MMHG | TEMPERATURE: 99 F

## 2024-06-04 PROCEDURE — 1090000001 HH PPS REVENUE CREDIT

## 2024-06-04 PROCEDURE — 1090000002 HH PPS REVENUE DEBIT

## 2024-06-04 PROCEDURE — G0152 HHCP-SERV OF OT,EA 15 MIN: HCPCS | Mod: HHH

## 2024-06-04 ASSESSMENT — ENCOUNTER SYMPTOMS: DENIES PAIN: 1

## 2024-06-04 NOTE — PROGRESS NOTES
Discharge Facility: [facility]  Discharge Diagnosis: Retention of urine  Admission Date: 5/29/2024  Discharge Date: 5/31/2024    PCP Appointment Date: 6/12/2024  Specialist Appointment Date: none  Hospital Encounter and Summary: Linked       START taking these medications     tamsulosin 0.4 MG capsule  Commonly known as: FLOMAX  Take 1 capsule by mouth daily  Start taking on: June 1, 2024     STOP taking these medications     sulfamethoxazole-trimethoprim 800-160 MG per tablet  Commonly known as: BACTRIM DS;SEPTRA DS     Two attempts were made to reach patient within two business days after discharge. Voicemail left with contact information for patient to call back with any non-emergent questions or concerns.

## 2024-06-05 ENCOUNTER — HOME CARE VISIT (OUTPATIENT)
Dept: HOME HEALTH SERVICES | Facility: HOME HEALTH | Age: 79
End: 2024-06-05
Payer: MEDICARE

## 2024-06-05 ENCOUNTER — TELEPHONE (OUTPATIENT)
Dept: PRIMARY CARE | Facility: CLINIC | Age: 79
End: 2024-06-05
Payer: MEDICARE

## 2024-06-05 VITALS
SYSTOLIC BLOOD PRESSURE: 130 MMHG | DIASTOLIC BLOOD PRESSURE: 80 MMHG | HEART RATE: 69 BPM | TEMPERATURE: 99.6 F | OXYGEN SATURATION: 96 %

## 2024-06-05 PROCEDURE — 1090000001 HH PPS REVENUE CREDIT

## 2024-06-05 PROCEDURE — 1090000002 HH PPS REVENUE DEBIT

## 2024-06-05 PROCEDURE — G0299 HHS/HOSPICE OF RN EA 15 MIN: HCPCS | Mod: HHH

## 2024-06-05 SDOH — ECONOMIC STABILITY: HOUSING INSECURITY
HOME SAFETY: PLOF: INDEP ADLS, WALKING WITH WHEELED WALKER, MEALS ON WHEELS, FAMILY LIVES CLOSE BY AND ASSISTS DAILY AS NEEDED AND ABLE  PATIENT HAS FOLEY CATHETER  PRIMARY REASON FOR HOME CARE: DECREASED STRENGTH, MOBILITY, TRANSFERS AND ADLS AND IADLS, REQURING

## 2024-06-05 SDOH — ECONOMIC STABILITY: HOUSING INSECURITY
HOME SAFETY: CAREGIVER ASSISTANCE AND SUPERVISION FOR SAFETY AND TO DECREASE RISK OF FALLS .  SKILLED NEEDS: FUNCTIONAL TRANSFER TRAINING, ADL RETRAINING, IADL RETRAINING, HOME SAFETY AND FALL PREVENTION, DME INSTRUCTION AND EDUCATION, BALANCE, UE STRENGTHENING

## 2024-06-05 SDOH — ECONOMIC STABILITY: HOUSING INSECURITY
HOME SAFETY: TH PLAN OF CARE AND VISIT FREQUENCY.  OT TO TX 2W4  DISCIPLINE COMMUNICATION: SN, MD, PT  PLAN FOR NEXT VISIT: ADL TRANSFER TRAINING, UE HEP

## 2024-06-05 SDOH — ECONOMIC STABILITY: HOUSING INSECURITY
HOME SAFETY: INSTRUCTION PROVIDED: SAFE TRANSFER TECHNIQUES, BENEFITS OF BEDSIDE COMMODE, HOME SAFETY AND FALL PREVENTION  PATIENT RESPONSE TO INSTRUCTION: VERBALIZES UNDERSTANDING   PATIENT INSTRUCTED ON PLAN OF CARE AND VISIT FREQUENCY. PATIENT IN AGREEMENT WI

## 2024-06-05 ASSESSMENT — ACTIVITIES OF DAILY LIVING (ADL)
CURRENT_FUNCTION: MINIMUM ASSIST
DRESSING_UB_CURRENT_FUNCTION: SUPERVISION
DRESSING_LB_CURRENT_FUNCTION: MODERATE ASSIST
PHYSICAL TRANSFERS ASSESSED: 1
BATHING ASSESSED: 1
BATHING_CURRENT_FUNCTION: MODERATE ASSIST
TOILETING: MINIMUM ASSIST
AMBULATION ASSISTANCE: 1
TOILETING: 1
CURRENT_FUNCTION: STAND BY ASSIST
GROOMING_CURRENT_FUNCTION: MINIMUM ASSIST
AMBULATION ASSISTANCE: MINIMUM ASSIST
PHYSICAL TRANSFERS ASSESSED: 1
GROOMING ASSESSED: 1

## 2024-06-05 ASSESSMENT — ENCOUNTER SYMPTOMS
LOWEST PAIN SEVERITY IN PAST 24 HOURS: 0/10
SUBJECTIVE PAIN PROGRESSION: GRADUALLY IMPROVING
HIGHEST PAIN SEVERITY IN PAST 24 HOURS: 5/10
PAIN: 1
APPETITE LEVEL: GOOD
PAIN SEVERITY GOAL: 0/10
MUSCLE WEAKNESS: 1
PAIN LOCATION: PENIS

## 2024-06-06 ENCOUNTER — HOME CARE VISIT (OUTPATIENT)
Dept: HOME HEALTH SERVICES | Facility: HOME HEALTH | Age: 79
End: 2024-06-06
Payer: MEDICARE

## 2024-06-06 VITALS
HEART RATE: 63 BPM | DIASTOLIC BLOOD PRESSURE: 62 MMHG | TEMPERATURE: 98 F | OXYGEN SATURATION: 97 % | SYSTOLIC BLOOD PRESSURE: 128 MMHG | RESPIRATION RATE: 14 BRPM

## 2024-06-06 PROCEDURE — 1090000001 HH PPS REVENUE CREDIT

## 2024-06-06 PROCEDURE — G0151 HHCP-SERV OF PT,EA 15 MIN: HCPCS | Mod: HHH

## 2024-06-06 PROCEDURE — 1090000002 HH PPS REVENUE DEBIT

## 2024-06-06 SDOH — HEALTH STABILITY: PHYSICAL HEALTH: EXERCISE ACTIVITIES SETS: 1

## 2024-06-06 SDOH — HEALTH STABILITY: PHYSICAL HEALTH: PHYSICAL EXERCISE: SEATED

## 2024-06-06 SDOH — HEALTH STABILITY: PHYSICAL HEALTH: EXERCISE ACTIVITY: MARCHING

## 2024-06-06 SDOH — HEALTH STABILITY: PHYSICAL HEALTH: PHYSICAL EXERCISE: 15

## 2024-06-06 SDOH — HEALTH STABILITY: PHYSICAL HEALTH: RESISTANCE: ORANGE THERABAND

## 2024-06-06 SDOH — HEALTH STABILITY: PHYSICAL HEALTH

## 2024-06-06 SDOH — HEALTH STABILITY: PHYSICAL HEALTH: EXERCISE ACTIVITY: HS CURLS

## 2024-06-06 SDOH — HEALTH STABILITY: PHYSICAL HEALTH: EXERCISE ACTIVITY: HIP EXTENSION

## 2024-06-06 SDOH — HEALTH STABILITY: PHYSICAL HEALTH: EXERCISE ACTIVITY: HIP ABDUCTION

## 2024-06-06 SDOH — HEALTH STABILITY: PHYSICAL HEALTH: EXERCISE TYPE: LE EXERCISES

## 2024-06-06 SDOH — HEALTH STABILITY: PHYSICAL HEALTH: EXERCISE ACTIVITY: LAQ

## 2024-06-06 SDOH — HEALTH STABILITY: PHYSICAL HEALTH: EXERCISE ACTIVITY: ANKLE DF/PF

## 2024-06-06 ASSESSMENT — ACTIVITIES OF DAILY LIVING (ADL)
AMBULATION ASSISTANCE ON FLAT SURFACES: 1
AMBULATION_DISTANCE/DURATION_TOLERATED: 50 FT X 2
CURRENT_FUNCTION: STAND BY ASSIST
AMBULATION ASSISTANCE: STAND BY ASSIST

## 2024-06-06 ASSESSMENT — ENCOUNTER SYMPTOMS
MUSCLE WEAKNESS: 1
DENIES PAIN: 1
PERSON REPORTING PAIN: PATIENT

## 2024-06-07 ENCOUNTER — HOME CARE VISIT (OUTPATIENT)
Dept: HOME HEALTH SERVICES | Facility: HOME HEALTH | Age: 79
End: 2024-06-07
Payer: MEDICARE

## 2024-06-07 PROCEDURE — 1090000001 HH PPS REVENUE CREDIT

## 2024-06-07 PROCEDURE — G0152 HHCP-SERV OF OT,EA 15 MIN: HCPCS | Mod: HHH

## 2024-06-07 PROCEDURE — 1090000002 HH PPS REVENUE DEBIT

## 2024-06-07 ASSESSMENT — ENCOUNTER SYMPTOMS
PAIN LOCATION - EXACERBATING FACTORS: WALKING
PAIN LOCATION - PAIN QUALITY: BURNING
PAIN: 1
PERSON REPORTING PAIN: PATIENT
PAIN LOCATION - PAIN DURATION: YEARS
PAIN LOCATION - PAIN FREQUENCY: CONSTANT
SUBJECTIVE PAIN PROGRESSION: UNCHANGED
PAIN LOCATION - PAIN FREQUENCY: CONSTANT
PAIN LOCATION - PAIN DURATION: YEARS
PAIN LOCATION - PAIN QUALITY: BURNING
PAIN LOCATION: RIGHT FOOT
PAIN LOCATION: LEFT FOOT
PAIN LOCATION - EXACERBATING FACTORS: WALKING

## 2024-06-07 ASSESSMENT — ACTIVITIES OF DAILY LIVING (ADL)
TOILETING: MAXIMUM ASSIST
TOILETING: 1
AMBULATION ASSISTANCE: 1
AMBULATION ASSISTANCE: SUPERVISION
TOILETING EQUIPMENT USED: FOLEY CATHETER

## 2024-06-08 PROCEDURE — G0180 MD CERTIFICATION HHA PATIENT: HCPCS | Performed by: FAMILY MEDICINE

## 2024-06-08 PROCEDURE — 1090000002 HH PPS REVENUE DEBIT

## 2024-06-08 PROCEDURE — 1090000001 HH PPS REVENUE CREDIT

## 2024-06-09 PROCEDURE — 1090000001 HH PPS REVENUE CREDIT

## 2024-06-09 PROCEDURE — 1090000002 HH PPS REVENUE DEBIT

## 2024-06-10 ENCOUNTER — HOME CARE VISIT (OUTPATIENT)
Dept: HOME HEALTH SERVICES | Facility: HOME HEALTH | Age: 79
End: 2024-06-10
Payer: MEDICARE

## 2024-06-10 VITALS
TEMPERATURE: 98 F | OXYGEN SATURATION: 96 % | RESPIRATION RATE: 14 BRPM | HEART RATE: 77 BPM | DIASTOLIC BLOOD PRESSURE: 66 MMHG | SYSTOLIC BLOOD PRESSURE: 118 MMHG

## 2024-06-10 PROCEDURE — G0151 HHCP-SERV OF PT,EA 15 MIN: HCPCS | Mod: HHH

## 2024-06-10 PROCEDURE — 1090000002 HH PPS REVENUE DEBIT

## 2024-06-10 PROCEDURE — 1090000001 HH PPS REVENUE CREDIT

## 2024-06-10 SDOH — HEALTH STABILITY: PHYSICAL HEALTH: PHYSICAL EXERCISE: 15

## 2024-06-10 SDOH — HEALTH STABILITY: PHYSICAL HEALTH: EXERCISE ACTIVITY: HIP EXTENSION

## 2024-06-10 SDOH — HEALTH STABILITY: PHYSICAL HEALTH: EXERCISE ACTIVITY: MARCHING

## 2024-06-10 SDOH — HEALTH STABILITY: PHYSICAL HEALTH: EXERCISE ACTIVITIES SETS: 1

## 2024-06-10 SDOH — HEALTH STABILITY: PHYSICAL HEALTH: EXERCISE ACTIVITY: ANKLE DF/PF

## 2024-06-10 SDOH — HEALTH STABILITY: PHYSICAL HEALTH: EXERCISE ACTIVITY: LAQ

## 2024-06-10 SDOH — HEALTH STABILITY: PHYSICAL HEALTH: PHYSICAL EXERCISE: SEATED

## 2024-06-10 SDOH — HEALTH STABILITY: PHYSICAL HEALTH: EXERCISE TYPE: LE EXERCISES

## 2024-06-10 SDOH — HEALTH STABILITY: PHYSICAL HEALTH: EXERCISE ACTIVITY: HIP ABDUCTION

## 2024-06-10 SDOH — HEALTH STABILITY: PHYSICAL HEALTH: EXERCISE ACTIVITY: HS CURLS

## 2024-06-10 ASSESSMENT — ACTIVITIES OF DAILY LIVING (ADL)
CURRENT_FUNCTION: STAND BY ASSIST
AMBULATION ASSISTANCE: STAND BY ASSIST
AMBULATION ASSISTANCE ON FLAT SURFACES: 1
AMBULATION_DISTANCE/DURATION_TOLERATED: 50 FT X 2

## 2024-06-10 ASSESSMENT — ENCOUNTER SYMPTOMS
PERSON REPORTING PAIN: PATIENT
MUSCLE WEAKNESS: 1
DENIES PAIN: 1

## 2024-06-11 ENCOUNTER — APPOINTMENT (OUTPATIENT)
Dept: NEUROLOGY | Facility: CLINIC | Age: 79
End: 2024-06-11
Payer: MEDICARE

## 2024-06-12 ENCOUNTER — APPOINTMENT (OUTPATIENT)
Dept: PRIMARY CARE | Facility: CLINIC | Age: 79
End: 2024-06-12
Payer: MEDICARE

## 2024-06-12 ENCOUNTER — HOME CARE VISIT (OUTPATIENT)
Dept: HOME HEALTH SERVICES | Facility: HOME HEALTH | Age: 79
End: 2024-06-12
Payer: MEDICARE

## 2024-06-12 PROCEDURE — G0152 HHCP-SERV OF OT,EA 15 MIN: HCPCS | Mod: HHH

## 2024-06-12 ASSESSMENT — ENCOUNTER SYMPTOMS
PAIN LOCATION - PAIN SEVERITY: 5/10
PAIN SEVERITY GOAL: 0/10
PAIN: 1
SUBJECTIVE PAIN PROGRESSION: UNCHANGED
PAIN LOCATION - PAIN FREQUENCY: CONSTANT
PAIN LOCATION - PAIN SEVERITY: 5/10
PAIN LOCATION: RIGHT FOOT
LOWEST PAIN SEVERITY IN PAST 24 HOURS: 5/10
PAIN LOCATION - PAIN FREQUENCY: CONSTANT
PAIN LOCATION: LEFT FOOT
HIGHEST PAIN SEVERITY IN PAST 24 HOURS: 5/10
PERSON REPORTING PAIN: PATIENT

## 2024-06-13 ENCOUNTER — HOME CARE VISIT (OUTPATIENT)
Dept: HOME HEALTH SERVICES | Facility: HOME HEALTH | Age: 79
End: 2024-06-13
Payer: MEDICARE

## 2024-06-13 VITALS
RESPIRATION RATE: 14 BRPM | HEART RATE: 83 BPM | DIASTOLIC BLOOD PRESSURE: 70 MMHG | OXYGEN SATURATION: 97 % | SYSTOLIC BLOOD PRESSURE: 132 MMHG | TEMPERATURE: 97.9 F

## 2024-06-13 PROCEDURE — G0151 HHCP-SERV OF PT,EA 15 MIN: HCPCS | Mod: HHH

## 2024-06-13 SDOH — HEALTH STABILITY: PHYSICAL HEALTH: EXERCISE ACTIVITY: HS CURLS

## 2024-06-13 SDOH — HEALTH STABILITY: PHYSICAL HEALTH: EXERCISE ACTIVITY: LAQ

## 2024-06-13 SDOH — HEALTH STABILITY: PHYSICAL HEALTH: PHYSICAL EXERCISE: SEATED

## 2024-06-13 SDOH — HEALTH STABILITY: PHYSICAL HEALTH: PHYSICAL EXERCISE: 15

## 2024-06-13 SDOH — HEALTH STABILITY: PHYSICAL HEALTH: EXERCISE TYPE: LE EXERCISES

## 2024-06-13 SDOH — HEALTH STABILITY: PHYSICAL HEALTH: EXERCISE ACTIVITIES SETS: 1

## 2024-06-13 SDOH — HEALTH STABILITY: PHYSICAL HEALTH: EXERCISE ACTIVITY: HIP EXTENSION

## 2024-06-13 SDOH — HEALTH STABILITY: PHYSICAL HEALTH: EXERCISE ACTIVITY: HIP ABDUCTION

## 2024-06-13 SDOH — HEALTH STABILITY: PHYSICAL HEALTH: EXERCISE ACTIVITY: ANKLE DF/PF

## 2024-06-13 SDOH — HEALTH STABILITY: PHYSICAL HEALTH: EXERCISE ACTIVITY: MARCHING

## 2024-06-13 ASSESSMENT — ACTIVITIES OF DAILY LIVING (ADL)
AMBULATION_DISTANCE/DURATION_TOLERATED: 50 FT X 2
AMBULATION ASSISTANCE: STAND BY ASSIST
AMBULATION ASSISTANCE ON FLAT SURFACES: 1
CURRENT_FUNCTION: STAND BY ASSIST

## 2024-06-13 ASSESSMENT — ENCOUNTER SYMPTOMS
DENIES PAIN: 1
PERSON REPORTING PAIN: PATIENT
MUSCLE WEAKNESS: 1

## 2024-06-14 ENCOUNTER — HOME CARE VISIT (OUTPATIENT)
Dept: HOME HEALTH SERVICES | Facility: HOME HEALTH | Age: 79
End: 2024-06-14
Payer: MEDICARE

## 2024-06-14 PROCEDURE — G0152 HHCP-SERV OF OT,EA 15 MIN: HCPCS | Mod: HHH

## 2024-06-14 ASSESSMENT — ACTIVITIES OF DAILY LIVING (ADL)
AMBULATION ASSISTANCE: SUPERVISION
PHYSICAL TRANSFERS ASSESSED: 1
CURRENT_FUNCTION: SUPERVISION
AMBULATION ASSISTANCE: 1
PHYSICAL_TRANSFERS_DEVICES: FRONT WHEELED WALKER

## 2024-06-14 ASSESSMENT — ENCOUNTER SYMPTOMS
PAIN LOCATION - PAIN SEVERITY: 5/10
PAIN LOCATION: LEFT FOOT
PERSON REPORTING PAIN: PATIENT
PAIN LOCATION - PAIN SEVERITY: 5/10
PAIN LOCATION: RIGHT FOOT
PAIN: 1

## 2024-06-17 ENCOUNTER — PATIENT OUTREACH (OUTPATIENT)
Dept: PRIMARY CARE | Facility: CLINIC | Age: 79
End: 2024-06-17
Payer: MEDICARE

## 2024-06-17 ENCOUNTER — HOME CARE VISIT (OUTPATIENT)
Dept: HOME HEALTH SERVICES | Facility: HOME HEALTH | Age: 79
End: 2024-06-17
Payer: MEDICARE

## 2024-06-17 VITALS
OXYGEN SATURATION: 99 % | SYSTOLIC BLOOD PRESSURE: 132 MMHG | RESPIRATION RATE: 14 BRPM | DIASTOLIC BLOOD PRESSURE: 70 MMHG | TEMPERATURE: 98.2 F | HEART RATE: 92 BPM

## 2024-06-17 PROCEDURE — G0152 HHCP-SERV OF OT,EA 15 MIN: HCPCS | Mod: HHH

## 2024-06-17 PROCEDURE — G0151 HHCP-SERV OF PT,EA 15 MIN: HCPCS | Mod: HHH

## 2024-06-17 SDOH — HEALTH STABILITY: PHYSICAL HEALTH

## 2024-06-17 SDOH — HEALTH STABILITY: PHYSICAL HEALTH: EXERCISE ACTIVITIES SETS: 1

## 2024-06-17 SDOH — HEALTH STABILITY: PHYSICAL HEALTH: RESISTANCE: ORANGE THERABAND

## 2024-06-17 SDOH — HEALTH STABILITY: PHYSICAL HEALTH: EXERCISE ACTIVITY: HIP EXTENSION

## 2024-06-17 SDOH — HEALTH STABILITY: PHYSICAL HEALTH: PHYSICAL EXERCISE: 15

## 2024-06-17 SDOH — HEALTH STABILITY: PHYSICAL HEALTH: PHYSICAL EXERCISE: SEATED

## 2024-06-17 SDOH — HEALTH STABILITY: PHYSICAL HEALTH: EXERCISE ACTIVITY: HIP ABDUCTION

## 2024-06-17 SDOH — HEALTH STABILITY: PHYSICAL HEALTH: EXERCISE ACTIVITY: HS CURLS

## 2024-06-17 SDOH — HEALTH STABILITY: PHYSICAL HEALTH: EXERCISE ACTIVITY: MARCHING

## 2024-06-17 SDOH — HEALTH STABILITY: PHYSICAL HEALTH: EXERCISE TYPE: LE EXERCISES

## 2024-06-17 SDOH — HEALTH STABILITY: PHYSICAL HEALTH: EXERCISE ACTIVITY: ANKLE DF/PF

## 2024-06-17 SDOH — HEALTH STABILITY: PHYSICAL HEALTH: EXERCISE ACTIVITY: LAQ

## 2024-06-17 ASSESSMENT — ACTIVITIES OF DAILY LIVING (ADL)
CURRENT_FUNCTION: STAND BY ASSIST
AMBULATION ASSISTANCE ON FLAT SURFACES: 1
AMBULATION_DISTANCE/DURATION_TOLERATED: 75 FT X 2
AMBULATION ASSISTANCE: STAND BY ASSIST

## 2024-06-17 ASSESSMENT — ENCOUNTER SYMPTOMS: MUSCLE WEAKNESS: 1

## 2024-06-17 NOTE — PROGRESS NOTES
Unable to reach patient for call back after patient's follow up appointment with PCP.  Scheduled for 6/12/2024- patient was a no show.   LVM with call back number for patient to call if needed   If no voicemail available call attempts x 2 were made to contact the patient to assist with any questions or concerns patient may have.

## 2024-06-18 ENCOUNTER — HOME CARE VISIT (OUTPATIENT)
Dept: HOME HEALTH SERVICES | Facility: HOME HEALTH | Age: 79
End: 2024-06-18
Payer: MEDICARE

## 2024-06-18 ENCOUNTER — APPOINTMENT (OUTPATIENT)
Dept: NEUROLOGY | Facility: CLINIC | Age: 79
End: 2024-06-18
Payer: MEDICARE

## 2024-06-18 VITALS
RESPIRATION RATE: 18 BRPM | DIASTOLIC BLOOD PRESSURE: 66 MMHG | SYSTOLIC BLOOD PRESSURE: 116 MMHG | OXYGEN SATURATION: 95 % | TEMPERATURE: 97.7 F | HEART RATE: 84 BPM

## 2024-06-18 PROCEDURE — G0299 HHS/HOSPICE OF RN EA 15 MIN: HCPCS | Mod: HHH

## 2024-06-18 SDOH — ECONOMIC STABILITY: GENERAL

## 2024-06-18 ASSESSMENT — ENCOUNTER SYMPTOMS
PERSON REPORTING PAIN: PATIENT
OCCASIONAL FEELINGS OF UNSTEADINESS: 1
ARTHRALGIAS: 1
DENIES PAIN: 1
MUSCLE WEAKNESS: 1
DEPRESSION: 0
CHANGE IN APPETITE: UNCHANGED
LOSS OF SENSATION IN FEET: 0
DENIES PAIN: 1
APPETITE LEVEL: GOOD

## 2024-06-18 ASSESSMENT — PAIN SCALES - PAIN ASSESSMENT IN ADVANCED DEMENTIA (PAINAD)
FACIALEXPRESSION: 0 - SMILING OR INEXPRESSIVE.
BODYLANGUAGE: 0 - RELAXED.
TOTALSCORE: 0
NEGVOCALIZATION: 0 - NONE.
CONSOLABILITY: 0
BREATHING: 0
CONSOLABILITY: 0 - NO NEED TO CONSOLE.
BODYLANGUAGE: 0
NEGVOCALIZATION: 0
FACIALEXPRESSION: 0

## 2024-06-18 ASSESSMENT — ACTIVITIES OF DAILY LIVING (ADL): MONEY MANAGEMENT (EXPENSES/BILLS): NEEDS ASSISTANCE

## 2024-06-19 ENCOUNTER — HOME CARE VISIT (OUTPATIENT)
Dept: HOME HEALTH SERVICES | Facility: HOME HEALTH | Age: 79
End: 2024-06-19
Payer: MEDICARE

## 2024-06-19 VITALS
SYSTOLIC BLOOD PRESSURE: 128 MMHG | OXYGEN SATURATION: 95 % | DIASTOLIC BLOOD PRESSURE: 80 MMHG | RESPIRATION RATE: 14 BRPM | HEART RATE: 79 BPM | TEMPERATURE: 98.3 F

## 2024-06-19 PROCEDURE — G0151 HHCP-SERV OF PT,EA 15 MIN: HCPCS | Mod: HHH

## 2024-06-19 SDOH — HEALTH STABILITY: PHYSICAL HEALTH: EXERCISE ACTIVITY: HIP EXTENSION

## 2024-06-19 SDOH — HEALTH STABILITY: PHYSICAL HEALTH: PHYSICAL EXERCISE: SEATED

## 2024-06-19 SDOH — HEALTH STABILITY: PHYSICAL HEALTH: EXERCISE TYPE: LE EXERCISES

## 2024-06-19 SDOH — HEALTH STABILITY: PHYSICAL HEALTH: PHYSICAL EXERCISE: 15

## 2024-06-19 SDOH — HEALTH STABILITY: PHYSICAL HEALTH

## 2024-06-19 SDOH — HEALTH STABILITY: PHYSICAL HEALTH: RESISTANCE: ORANGE THERABAND

## 2024-06-19 SDOH — HEALTH STABILITY: PHYSICAL HEALTH: EXERCISE ACTIVITY: HS CURLS

## 2024-06-19 SDOH — HEALTH STABILITY: PHYSICAL HEALTH: EXERCISE ACTIVITY: HIP ABDUCTION

## 2024-06-19 SDOH — HEALTH STABILITY: PHYSICAL HEALTH: EXERCISE ACTIVITIES SETS: 1

## 2024-06-19 SDOH — HEALTH STABILITY: PHYSICAL HEALTH: EXERCISE ACTIVITY: LAQ

## 2024-06-19 SDOH — HEALTH STABILITY: PHYSICAL HEALTH: EXERCISE ACTIVITY: MARCHING

## 2024-06-19 SDOH — HEALTH STABILITY: PHYSICAL HEALTH: EXERCISE ACTIVITY: ANKLE DF/PF

## 2024-06-19 ASSESSMENT — ACTIVITIES OF DAILY LIVING (ADL)
AMBULATION_DISTANCE/DURATION_TOLERATED: 75 FT X 2
AMBULATION ASSISTANCE: STAND BY ASSIST
AMBULATION ASSISTANCE ON FLAT SURFACES: 1
CURRENT_FUNCTION: STAND BY ASSIST

## 2024-06-19 ASSESSMENT — ENCOUNTER SYMPTOMS
MUSCLE WEAKNESS: 1
PERSON REPORTING PAIN: PATIENT
DENIES PAIN: 1

## 2024-06-20 ENCOUNTER — HOME CARE VISIT (OUTPATIENT)
Dept: HOME HEALTH SERVICES | Facility: HOME HEALTH | Age: 79
End: 2024-06-20
Payer: MEDICARE

## 2024-06-20 PROCEDURE — G0152 HHCP-SERV OF OT,EA 15 MIN: HCPCS | Mod: HHH

## 2024-06-20 ASSESSMENT — ENCOUNTER SYMPTOMS
PERSON REPORTING PAIN: PATIENT
PAIN LOCATION: RIGHT FOOT
PAIN LOCATION - PAIN SEVERITY: 5/10
PAIN LOCATION: LEFT FOOT
PAIN: 1
PAIN LOCATION - PAIN FREQUENCY: CONSTANT
PAIN LOCATION - PAIN SEVERITY: 5/10
PAIN LOCATION - PAIN FREQUENCY: CONSTANT

## 2024-06-24 ENCOUNTER — HOME CARE VISIT (OUTPATIENT)
Dept: HOME HEALTH SERVICES | Facility: HOME HEALTH | Age: 79
End: 2024-06-24
Payer: MEDICARE

## 2024-06-24 VITALS
HEART RATE: 76 BPM | OXYGEN SATURATION: 100 % | DIASTOLIC BLOOD PRESSURE: 70 MMHG | RESPIRATION RATE: 18 BRPM | SYSTOLIC BLOOD PRESSURE: 124 MMHG | TEMPERATURE: 98.9 F

## 2024-06-24 VITALS
HEART RATE: 87 BPM | RESPIRATION RATE: 14 BRPM | SYSTOLIC BLOOD PRESSURE: 118 MMHG | DIASTOLIC BLOOD PRESSURE: 70 MMHG | TEMPERATURE: 98.4 F | OXYGEN SATURATION: 96 %

## 2024-06-24 PROCEDURE — G0151 HHCP-SERV OF PT,EA 15 MIN: HCPCS | Mod: HHH

## 2024-06-24 PROCEDURE — G0152 HHCP-SERV OF OT,EA 15 MIN: HCPCS | Mod: HHH

## 2024-06-24 SDOH — HEALTH STABILITY: PHYSICAL HEALTH: PHYSICAL EXERCISE: 15

## 2024-06-24 SDOH — HEALTH STABILITY: PHYSICAL HEALTH: PHYSICAL EXERCISE: SEATED

## 2024-06-24 SDOH — HEALTH STABILITY: PHYSICAL HEALTH: EXERCISE ACTIVITIES SETS: 1

## 2024-06-24 SDOH — HEALTH STABILITY: PHYSICAL HEALTH: EXERCISE ACTIVITY: MARCHING

## 2024-06-24 SDOH — HEALTH STABILITY: PHYSICAL HEALTH: EXERCISE ACTIVITY: HIP ABDUCTION

## 2024-06-24 SDOH — HEALTH STABILITY: PHYSICAL HEALTH: EXERCISE TYPE: LE EXERCISES

## 2024-06-24 SDOH — HEALTH STABILITY: PHYSICAL HEALTH: RESISTANCE: ORANGE THERABAND

## 2024-06-24 SDOH — HEALTH STABILITY: PHYSICAL HEALTH: EXERCISE ACTIVITY: HIP EXTENSION

## 2024-06-24 SDOH — HEALTH STABILITY: PHYSICAL HEALTH

## 2024-06-24 SDOH — HEALTH STABILITY: PHYSICAL HEALTH: EXERCISE ACTIVITY: LAQ

## 2024-06-24 SDOH — HEALTH STABILITY: PHYSICAL HEALTH: EXERCISE ACTIVITY: HS CURLS

## 2024-06-24 SDOH — HEALTH STABILITY: PHYSICAL HEALTH: EXERCISE ACTIVITY: ANKLE DF/PF

## 2024-06-24 ASSESSMENT — ENCOUNTER SYMPTOMS
DENIES PAIN: 1
MUSCLE WEAKNESS: 1
DENIES PAIN: 1
PERSON REPORTING PAIN: PATIENT

## 2024-06-24 ASSESSMENT — ACTIVITIES OF DAILY LIVING (ADL)
AMBULATION ASSISTANCE ON FLAT SURFACES: 1
CURRENT_FUNCTION: STAND BY ASSIST
AMBULATION_DISTANCE/DURATION_TOLERATED: 50 FT X 2
AMBULATION ASSISTANCE: STAND BY ASSIST
DRESSING_UB_CURRENT_FUNCTION: SUPERVISION
DRESSING_LB_CURRENT_FUNCTION: MAXIMUM ASSIST
AMBULATION ASSISTANCE: SUPERVISION
GROOMING ASSESSED: 1
AMBULATION ASSISTANCE: 1
GROOMING_CURRENT_FUNCTION: MINIMUM ASSIST

## 2024-06-26 ENCOUNTER — HOME CARE VISIT (OUTPATIENT)
Dept: HOME HEALTH SERVICES | Facility: HOME HEALTH | Age: 79
End: 2024-06-26
Payer: MEDICARE

## 2024-06-26 VITALS
TEMPERATURE: 98.2 F | HEART RATE: 83 BPM | OXYGEN SATURATION: 97 % | SYSTOLIC BLOOD PRESSURE: 101 MMHG | RESPIRATION RATE: 16 BRPM | DIASTOLIC BLOOD PRESSURE: 54 MMHG

## 2024-06-26 PROCEDURE — G0152 HHCP-SERV OF OT,EA 15 MIN: HCPCS | Mod: HHH

## 2024-06-26 PROCEDURE — G0299 HHS/HOSPICE OF RN EA 15 MIN: HCPCS | Mod: HHH

## 2024-06-26 SDOH — ECONOMIC STABILITY: GENERAL

## 2024-06-26 ASSESSMENT — ENCOUNTER SYMPTOMS
SUBJECTIVE PAIN PROGRESSION: UNCHANGED
PAIN LOCATION: LEFT FOOT
LIMITED RANGE OF MOTION: 1
LOWEST PAIN SEVERITY IN PAST 24 HOURS: 5/10
PERSON REPORTING PAIN: PATIENT
APPETITE LEVEL: FAIR
PAIN LOCATION: RIGHT FOOT
PAIN LOCATION - PAIN FREQUENCY: CONSTANT
PAIN SEVERITY GOAL: 4/10
PAIN LOCATION - PAIN FREQUENCY: CONSTANT
PERSON REPORTING PAIN: PATIENT
PAIN: 1
HIGHEST PAIN SEVERITY IN PAST 24 HOURS: 5/10
ARTHRALGIAS: 1
PAIN LOCATION - PAIN SEVERITY: 5/10
PAIN LOCATION - PAIN QUALITY: PINS AND NEEDLES
PAIN LOCATION - PAIN QUALITY: PINS AND NEEDLES
DENIES PAIN: 1
CHANGE IN APPETITE: UNCHANGED
MUSCLE WEAKNESS: 1
PAIN LOCATION - PAIN SEVERITY: 5/10

## 2024-06-26 ASSESSMENT — ACTIVITIES OF DAILY LIVING (ADL)
TOILETING: SUPERVISION
TOILETING: 1
PHYSICAL TRANSFERS ASSESSED: 1
DRESSING_UB_CURRENT_FUNCTION: SUPERVISION
GROOMING_CURRENT_FUNCTION: SUPERVISION
GROOMING ASSESSED: 1
BATHING ASSESSED: 1
MONEY MANAGEMENT (EXPENSES/BILLS): NEEDS ASSISTANCE
CURRENT_FUNCTION: SUPERVISION
AMBULATION ASSISTANCE: SUPERVISION
BATHING_CURRENT_FUNCTION: MINIMUM ASSIST
AMBULATION ASSISTANCE: 1
DRESSING_LB_CURRENT_FUNCTION: MINIMUM ASSIST

## 2024-06-27 ENCOUNTER — HOME CARE VISIT (OUTPATIENT)
Dept: HOME HEALTH SERVICES | Facility: HOME HEALTH | Age: 79
End: 2024-06-27
Payer: MEDICARE

## 2024-06-27 VITALS
OXYGEN SATURATION: 97 % | DIASTOLIC BLOOD PRESSURE: 68 MMHG | TEMPERATURE: 97.9 F | HEART RATE: 76 BPM | SYSTOLIC BLOOD PRESSURE: 118 MMHG | RESPIRATION RATE: 14 BRPM

## 2024-06-27 PROCEDURE — G0151 HHCP-SERV OF PT,EA 15 MIN: HCPCS | Mod: HHH

## 2024-06-27 SDOH — HEALTH STABILITY: PHYSICAL HEALTH: RESISTANCE: ORANGE THERABAND

## 2024-06-27 SDOH — HEALTH STABILITY: PHYSICAL HEALTH: EXERCISE ACTIVITY: MARCHING

## 2024-06-27 SDOH — HEALTH STABILITY: PHYSICAL HEALTH: PHYSICAL EXERCISE: 15

## 2024-06-27 SDOH — HEALTH STABILITY: PHYSICAL HEALTH: EXERCISE ACTIVITIES SETS: 1

## 2024-06-27 SDOH — HEALTH STABILITY: PHYSICAL HEALTH: PHYSICAL EXERCISE: SEATED

## 2024-06-27 SDOH — HEALTH STABILITY: PHYSICAL HEALTH: EXERCISE ACTIVITY: HS CURLS

## 2024-06-27 SDOH — HEALTH STABILITY: PHYSICAL HEALTH: EXERCISE TYPE: LE EXERCISES

## 2024-06-27 SDOH — HEALTH STABILITY: PHYSICAL HEALTH

## 2024-06-27 SDOH — HEALTH STABILITY: PHYSICAL HEALTH: EXERCISE ACTIVITY: LAQ

## 2024-06-27 SDOH — HEALTH STABILITY: PHYSICAL HEALTH: EXERCISE ACTIVITY: HIP EXTENSION

## 2024-06-27 SDOH — HEALTH STABILITY: PHYSICAL HEALTH: EXERCISE ACTIVITY: ANKLE DF/PF

## 2024-06-27 SDOH — HEALTH STABILITY: PHYSICAL HEALTH: EXERCISE ACTIVITY: HIP ABDUCTION

## 2024-06-27 ASSESSMENT — ENCOUNTER SYMPTOMS
PERSON REPORTING PAIN: PATIENT
MUSCLE WEAKNESS: 1
DENIES PAIN: 1
OCCASIONAL FEELINGS OF UNSTEADINESS: 1

## 2024-06-27 ASSESSMENT — BALANCE ASSESSMENTS
NUDGED: 0 - BEGINS TO FALL
EYES CLOSED AT MAXIMUM POSITION NUDGED: 0 - UNSTEADY
ATTEMPTS TO ARISE: 2 - ABLE TO RISE, ONE ATTEMPT
TURNING 360 DEGREES STEPS: 0 - DISCONTINUOUS STEPS
STANDING BALANCE: 1 - STEADY BUT WIDE STANCE AND USES CANE OR OTHER SUPPORT
IMMEDIATE STANDING BALANCE FIRST 5 SECONDS: 1 - STEADY BUT USES WALKER OR OTHER SUPPORT
SITTING DOWN: 1 - USES ARMS OR NOT SMOOTH MOTION
NUDGED SCORE: 0
BALANCE SCORE: 8
SITTING BALANCE: 1 - STEADY, SAFE
ARISES: 1 - ABLE, USES ARMS TO HELP
ARISING SCORE: 1

## 2024-06-27 ASSESSMENT — ACTIVITIES OF DAILY LIVING (ADL)
AMBULATION_DISTANCE/DURATION_TOLERATED: 75 FT X 2
CURRENT_FUNCTION: STAND BY ASSIST
AMBULATION ASSISTANCE ON FLAT SURFACES: 1
AMBULATION ASSISTANCE: STAND BY ASSIST

## 2024-06-27 ASSESSMENT — GAIT ASSESSMENTS
BALANCE AND GAIT SCORE: 16
PATH SCORE: 0
TRUNK: 0 - MARKED SWAY OR USES WALKING AID
STEP CONTINUITY: 1 - STEPS APPEAR CONTINUOUS
GAIT SCORE: 8
INITIATION OF GAIT IMMEDIATELY AFTER GO: 1 - NO HESITANCY
STEP SYMMETRY: 1 - RIGHT AND LEFT STEP LENGTH APPEAR EQUAL
TRUNK SCORE: 0
WALKING STANCE: 1 - HEELS ALMOST TOUCHING WHILE WALKING
PATH: 0 - MARKED DEVIATION

## 2024-07-01 ENCOUNTER — HOME CARE VISIT (OUTPATIENT)
Dept: HOME HEALTH SERVICES | Facility: HOME HEALTH | Age: 79
End: 2024-07-01
Payer: MEDICARE

## 2024-07-01 ENCOUNTER — OFFICE VISIT (OUTPATIENT)
Dept: UROLOGY | Facility: HOSPITAL | Age: 79
End: 2024-07-01
Payer: MEDICARE

## 2024-07-01 DIAGNOSIS — N40.1 BPH WITH OBSTRUCTION/LOWER URINARY TRACT SYMPTOMS: ICD-10-CM

## 2024-07-01 DIAGNOSIS — R97.20 ELEVATED PSA: Primary | ICD-10-CM

## 2024-07-01 DIAGNOSIS — N13.8 BPH WITH OBSTRUCTION/LOWER URINARY TRACT SYMPTOMS: ICD-10-CM

## 2024-07-01 PROCEDURE — 99214 OFFICE O/P EST MOD 30 MIN: CPT | Performed by: UROLOGY

## 2024-07-01 PROCEDURE — G0299 HHS/HOSPICE OF RN EA 15 MIN: HCPCS | Mod: HHH

## 2024-07-01 PROCEDURE — 1157F ADVNC CARE PLAN IN RCRD: CPT | Performed by: UROLOGY

## 2024-07-01 NOTE — PROGRESS NOTES
NAME:Marco Antonio Merlos Jr.  DATE: 7/1/2024               Subjective:   Chief complaint: Urinary retention    HPI:  79 y.o. male presenting for evaluation of urinary retention a     Pt has seen Dr. Huffman in the past.  Has parkinson's disease.   For his urinary symptoms he is on doxazosin, Flomax, finasteride. He is on Eliquis     Prostate vol (calc from CT pelvis 2022) - 248cc    Surgery not recommended given his advanced age and parkinson's    Most recent PSA 82.32ng/mL    Patient admitted at [facility] with SUBHASH and bilateral hydro on 5/29/24.  Holley placed.      Has home health, holley being changed monthly      Past Medical History:   Diagnosis Date    Personal history of other diseases of the musculoskeletal system and connective tissue 03/17/2022    History of arthritis    Personal history of other specified conditions 03/17/2022    History of balance disorder     Past Surgical History:   Procedure Laterality Date    OTHER SURGICAL HISTORY  03/17/2022    Joint replacement procedure     Social History     Tobacco Use    Smoking status: Never    Smokeless tobacco: Never   Substance Use Topics    Alcohol use: Never     No family history on file.    Current Outpatient Medications on File Prior to Visit   Medication Sig Dispense Refill    DULoxetine (Cymbalta) 30 mg DR capsule Take 1 capsule (30 mg) by mouth once daily for 7 days, THEN 2 capsules (60 mg) once daily. Do not crush or chew.. 30 capsule 2    finasteride (Proscar) 5 mg tablet Take 1 tablet (5 mg) by mouth once daily. 90 tablet 3    magnesium oxide (Mag-Ox) 400 mg (241.3 mg magnesium) tablet Take 1 tablet (400 mg) by mouth once daily. 90 tablet 2    melatonin 3 mg capsule Take by mouth.      melatonin 3 mg tablet Take 3 mg by mouth once daily at bedtime. Indications: difficulty falling asleep      tamsulosin (Flomax) 0.4 mg 24 hr capsule Take 1 capsule (0.4 mg) by mouth once daily. 90 capsule 3     No current facility-administered medications on file prior to  visit.     Marco Antonio has No Known Allergies.     Review of Systems    14 point ROS reviewed and discussed with the patient. Pertinent positives/negatives discussed in the History of Present Illness (HPI).    Objective:     Physical Exam   1. Constitutional: NAD, Well-developed, Well-nourished  2. Respiratory: Unlabored breathing, no audible wheezes, no use of accessory muscles   3. Cardiovascular: No JVD, extremities perfused, no edema  4. Holley in place    Labs  Cr 0.79    PSA 82.32      Assessment/Plan:   Marco Antonio Merlos Jr. presents with     1. Elevated PSA    2. BPH with obstruction/lower urinary tract symptoms      Pt has been holley dependent past month.  Tolerating ok.  Has seen Dr. Huffman in past, deemed not a good surgical candidate.    Discussed options of indwelling holley, SP tube, HOLEP.  Plan to just continue holley for now.  Will fu with Dr. Huffman in 1m to discuss other two options.     PSA elevated, was during hospitalization and retention.      Pt and son in agreement

## 2024-07-02 ENCOUNTER — HOME CARE VISIT (OUTPATIENT)
Dept: HOME HEALTH SERVICES | Facility: HOME HEALTH | Age: 79
End: 2024-07-02
Payer: MEDICARE

## 2024-07-02 ENCOUNTER — TELEPHONE (OUTPATIENT)
Dept: UROLOGY | Facility: HOSPITAL | Age: 79
End: 2024-07-02
Payer: MEDICARE

## 2024-07-02 VITALS
OXYGEN SATURATION: 96 % | HEART RATE: 88 BPM | DIASTOLIC BLOOD PRESSURE: 62 MMHG | RESPIRATION RATE: 18 BRPM | SYSTOLIC BLOOD PRESSURE: 124 MMHG | TEMPERATURE: 97.8 F

## 2024-07-02 VITALS
OXYGEN SATURATION: 98 % | TEMPERATURE: 98.1 F | SYSTOLIC BLOOD PRESSURE: 120 MMHG | DIASTOLIC BLOOD PRESSURE: 76 MMHG | RESPIRATION RATE: 14 BRPM | HEART RATE: 90 BPM

## 2024-07-02 DIAGNOSIS — N13.8 BPH WITH OBSTRUCTION/LOWER URINARY TRACT SYMPTOMS: Primary | ICD-10-CM

## 2024-07-02 DIAGNOSIS — N40.1 BPH WITH OBSTRUCTION/LOWER URINARY TRACT SYMPTOMS: Primary | ICD-10-CM

## 2024-07-02 PROCEDURE — G0151 HHCP-SERV OF PT,EA 15 MIN: HCPCS | Mod: HHH

## 2024-07-02 SDOH — HEALTH STABILITY: PHYSICAL HEALTH: PHYSICAL EXERCISE: SEATED

## 2024-07-02 SDOH — HEALTH STABILITY: PHYSICAL HEALTH

## 2024-07-02 SDOH — HEALTH STABILITY: PHYSICAL HEALTH: EXERCISE ACTIVITY: HS CURLS

## 2024-07-02 SDOH — HEALTH STABILITY: PHYSICAL HEALTH: PHYSICAL EXERCISE: 15

## 2024-07-02 SDOH — HEALTH STABILITY: PHYSICAL HEALTH: EXERCISE ACTIVITY: MARCHING

## 2024-07-02 SDOH — HEALTH STABILITY: PHYSICAL HEALTH: RESISTANCE: ORANGE THERABAND

## 2024-07-02 SDOH — HEALTH STABILITY: PHYSICAL HEALTH: EXERCISE ACTIVITY: LAQ

## 2024-07-02 SDOH — HEALTH STABILITY: PHYSICAL HEALTH: EXERCISE ACTIVITIES SETS: 1

## 2024-07-02 SDOH — HEALTH STABILITY: PHYSICAL HEALTH: EXERCISE ACTIVITY: ANKLE DF/PF

## 2024-07-02 SDOH — HEALTH STABILITY: PHYSICAL HEALTH: EXERCISE ACTIVITY: HIP ABDUCTION

## 2024-07-02 SDOH — HEALTH STABILITY: PHYSICAL HEALTH: EXERCISE ACTIVITY: HIP EXTENSION

## 2024-07-02 SDOH — HEALTH STABILITY: PHYSICAL HEALTH: EXERCISE TYPE: LE EXERCISES

## 2024-07-02 ASSESSMENT — ACTIVITIES OF DAILY LIVING (ADL)
AMBULATION_DISTANCE/DURATION_TOLERATED: 75FT X 2
AMBULATION ASSISTANCE ON FLAT SURFACES: 1
CURRENT_FUNCTION: STAND BY ASSIST
AMBULATION ASSISTANCE: STAND BY ASSIST

## 2024-07-02 ASSESSMENT — ENCOUNTER SYMPTOMS
MUSCLE WEAKNESS: 1
PERSON REPORTING PAIN: PATIENT
DENIES PAIN: 1
FORGETFULNESS: 1

## 2024-07-03 ENCOUNTER — HOME CARE VISIT (OUTPATIENT)
Dept: HOME HEALTH SERVICES | Facility: HOME HEALTH | Age: 79
End: 2024-07-03
Payer: MEDICARE

## 2024-07-03 ENCOUNTER — LAB REQUISITION (OUTPATIENT)
Dept: LAB | Facility: HOSPITAL | Age: 79
End: 2024-07-03
Payer: MEDICARE

## 2024-07-03 VITALS
HEART RATE: 68 BPM | OXYGEN SATURATION: 94 % | RESPIRATION RATE: 18 BRPM | DIASTOLIC BLOOD PRESSURE: 64 MMHG | SYSTOLIC BLOOD PRESSURE: 128 MMHG

## 2024-07-03 DIAGNOSIS — N40.1 BENIGN PROSTATIC HYPERPLASIA WITH LOWER URINARY TRACT SYMPTOMS: ICD-10-CM

## 2024-07-03 LAB
APPEARANCE UR: ABNORMAL
BACTERIA #/AREA URNS AUTO: ABNORMAL /HPF
BILIRUB UR STRIP.AUTO-MCNC: NEGATIVE MG/DL
COLOR UR: ABNORMAL
GLUCOSE UR STRIP.AUTO-MCNC: NORMAL MG/DL
KETONES UR STRIP.AUTO-MCNC: NEGATIVE MG/DL
LEUKOCYTE ESTERASE UR QL STRIP.AUTO: ABNORMAL
MUCOUS THREADS #/AREA URNS AUTO: ABNORMAL /LPF
NITRITE UR QL STRIP.AUTO: NEGATIVE
PH UR STRIP.AUTO: 6.5 [PH]
PROT UR STRIP.AUTO-MCNC: ABNORMAL MG/DL
RBC # UR STRIP.AUTO: ABNORMAL /UL
RBC #/AREA URNS AUTO: >20 /HPF
SP GR UR STRIP.AUTO: 1.01
UROBILINOGEN UR STRIP.AUTO-MCNC: NORMAL MG/DL
WBC #/AREA URNS AUTO: >50 /HPF
WBC CLUMPS #/AREA URNS AUTO: ABNORMAL /HPF

## 2024-07-03 PROCEDURE — 87086 URINE CULTURE/COLONY COUNT: CPT

## 2024-07-03 PROCEDURE — 87186 SC STD MICRODIL/AGAR DIL: CPT

## 2024-07-03 PROCEDURE — G0300 HHS/HOSPICE OF LPN EA 15 MIN: HCPCS | Mod: HHH

## 2024-07-03 PROCEDURE — 81001 URINALYSIS AUTO W/SCOPE: CPT

## 2024-07-03 SDOH — ECONOMIC STABILITY: GENERAL

## 2024-07-03 ASSESSMENT — ENCOUNTER SYMPTOMS
APPETITE LEVEL: GOOD
PERSON REPORTING PAIN: PATIENT
LIMITED RANGE OF MOTION: 1
DEPRESSION: 0
LOSS OF SENSATION IN FEET: 0
CHANGE IN APPETITE: UNCHANGED
DENIES PAIN: 1
OCCASIONAL FEELINGS OF UNSTEADINESS: 0
MUSCLE WEAKNESS: 1

## 2024-07-03 ASSESSMENT — ACTIVITIES OF DAILY LIVING (ADL)
MONEY MANAGEMENT (EXPENSES/BILLS): INDEPENDENT
FEEDING ASSESSED: 1
TOILETING: 1
TOILETING: INDEPENDENT
AMBULATION ASSISTANCE: INDEPENDENT
FEEDING: INDEPENDENT
BATHING ASSESSED: 1
DRESSING_LB_CURRENT_FUNCTION: INDEPENDENT
BATHING_CURRENT_FUNCTION: INDEPENDENT
DRESSING_UB_CURRENT_FUNCTION: INDEPENDENT
AMBULATION ASSISTANCE: 1

## 2024-07-03 NOTE — HOME HEALTH
Patient was seen for routine nursing visit with catheter care. Patient tolerated well. Urine sample obtained at request of son. Patient instructed to increase hydration and drain bag when its at 1/2 full. Patient is leaving bag too full leading to infections. Patient instructed to maintain safety. Admits to falling 4 days ago and was checked in the ER when catheter was changed. Patient denies any pain or injury. No further concerns at this time.

## 2024-07-04 ENCOUNTER — HOME CARE VISIT (OUTPATIENT)
Dept: HOME HEALTH SERVICES | Facility: HOME HEALTH | Age: 79
End: 2024-07-04
Payer: MEDICARE

## 2024-07-04 VITALS
TEMPERATURE: 97.8 F | HEART RATE: 87 BPM | RESPIRATION RATE: 14 BRPM | OXYGEN SATURATION: 97 % | DIASTOLIC BLOOD PRESSURE: 72 MMHG | SYSTOLIC BLOOD PRESSURE: 118 MMHG

## 2024-07-04 LAB — HOLD SPECIMEN: NORMAL

## 2024-07-04 PROCEDURE — G0151 HHCP-SERV OF PT,EA 15 MIN: HCPCS | Mod: HHH

## 2024-07-04 SDOH — HEALTH STABILITY: PHYSICAL HEALTH: PHYSICAL EXERCISE: 15

## 2024-07-04 SDOH — HEALTH STABILITY: PHYSICAL HEALTH: RESISTANCE: ORANGE THERABAND

## 2024-07-04 SDOH — HEALTH STABILITY: PHYSICAL HEALTH: EXERCISE ACTIVITIES SETS: 1

## 2024-07-04 SDOH — HEALTH STABILITY: PHYSICAL HEALTH: PHYSICAL EXERCISE: SEATED

## 2024-07-04 SDOH — HEALTH STABILITY: PHYSICAL HEALTH: EXERCISE ACTIVITY: LAQ

## 2024-07-04 SDOH — HEALTH STABILITY: PHYSICAL HEALTH

## 2024-07-04 SDOH — HEALTH STABILITY: PHYSICAL HEALTH: EXERCISE TYPE: LE EXERCISES

## 2024-07-04 SDOH — HEALTH STABILITY: PHYSICAL HEALTH: EXERCISE ACTIVITY: HIP EXTENSION

## 2024-07-04 SDOH — HEALTH STABILITY: PHYSICAL HEALTH: EXERCISE ACTIVITY: MARCHING

## 2024-07-04 SDOH — HEALTH STABILITY: PHYSICAL HEALTH: EXERCISE ACTIVITY: HIP ABDUCTION

## 2024-07-04 SDOH — HEALTH STABILITY: PHYSICAL HEALTH: EXERCISE ACTIVITY: HS CURLS

## 2024-07-04 SDOH — HEALTH STABILITY: PHYSICAL HEALTH: EXERCISE ACTIVITY: ANKLE DF/PF

## 2024-07-04 ASSESSMENT — ACTIVITIES OF DAILY LIVING (ADL)
AMBULATION ASSISTANCE ON FLAT SURFACES: 1
CURRENT_FUNCTION: STAND BY ASSIST
AMBULATION ASSISTANCE: STAND BY ASSIST

## 2024-07-04 ASSESSMENT — ENCOUNTER SYMPTOMS
DENIES PAIN: 1
PERSON REPORTING PAIN: PATIENT
MUSCLE WEAKNESS: 1

## 2024-07-06 LAB — BACTERIA UR CULT: ABNORMAL

## 2024-07-09 ENCOUNTER — APPOINTMENT (OUTPATIENT)
Dept: CARDIOLOGY | Facility: HOSPITAL | Age: 79
End: 2024-07-09
Payer: MEDICARE

## 2024-07-09 ENCOUNTER — HOME CARE VISIT (OUTPATIENT)
Dept: HOME HEALTH SERVICES | Facility: HOME HEALTH | Age: 79
End: 2024-07-09
Payer: MEDICARE

## 2024-07-09 ENCOUNTER — ANESTHESIA EVENT (OUTPATIENT)
Dept: OPERATING ROOM | Facility: HOSPITAL | Age: 79
End: 2024-07-09
Payer: MEDICARE

## 2024-07-09 ENCOUNTER — APPOINTMENT (OUTPATIENT)
Dept: RADIOLOGY | Facility: HOSPITAL | Age: 79
End: 2024-07-09
Payer: MEDICARE

## 2024-07-09 ENCOUNTER — ANESTHESIA (OUTPATIENT)
Dept: OPERATING ROOM | Facility: HOSPITAL | Age: 79
End: 2024-07-09
Payer: MEDICARE

## 2024-07-09 ENCOUNTER — HOSPITAL ENCOUNTER (INPATIENT)
Facility: HOSPITAL | Age: 79
LOS: 4 days | Discharge: NURSING, REHAB OR LONG-TERM CARE | End: 2024-07-13
Attending: INTERNAL MEDICINE | Admitting: STUDENT IN AN ORGANIZED HEALTH CARE EDUCATION/TRAINING PROGRAM
Payer: MEDICARE

## 2024-07-09 DIAGNOSIS — R39.15 BENIGN PROSTATIC HYPERPLASIA (BPH) WITH URINARY URGENCY: ICD-10-CM

## 2024-07-09 DIAGNOSIS — A49.8 INFECTION WITH ESBL KLEBSIELLA OXYTOCA: ICD-10-CM

## 2024-07-09 DIAGNOSIS — R31.0 GROSS HEMATURIA: ICD-10-CM

## 2024-07-09 DIAGNOSIS — N40.1 BENIGN PROSTATIC HYPERPLASIA (BPH) WITH URINARY URGENCY: ICD-10-CM

## 2024-07-09 DIAGNOSIS — Z16.12 INFECTION WITH ESBL KLEBSIELLA OXYTOCA: ICD-10-CM

## 2024-07-09 DIAGNOSIS — N39.0 COMPLICATED UTI (URINARY TRACT INFECTION): Primary | ICD-10-CM

## 2024-07-09 DIAGNOSIS — Z97.8 CHRONIC INDWELLING FOLEY CATHETER: ICD-10-CM

## 2024-07-09 LAB
ALBUMIN SERPL BCP-MCNC: 3.9 G/DL (ref 3.4–5)
ALP SERPL-CCNC: 94 U/L (ref 33–136)
ALT SERPL W P-5'-P-CCNC: 10 U/L (ref 10–52)
ANION GAP SERPL CALC-SCNC: 14 MMOL/L (ref 10–20)
AST SERPL W P-5'-P-CCNC: 15 U/L (ref 9–39)
BILIRUB SERPL-MCNC: 0.9 MG/DL (ref 0–1.2)
BUN SERPL-MCNC: 18 MG/DL (ref 6–23)
CALCIUM SERPL-MCNC: 8.8 MG/DL (ref 8.6–10.3)
CHLORIDE SERPL-SCNC: 106 MMOL/L (ref 98–107)
CO2 SERPL-SCNC: 20 MMOL/L (ref 21–32)
CREAT SERPL-MCNC: 1.02 MG/DL (ref 0.5–1.3)
EGFRCR SERPLBLD CKD-EPI 2021: 74 ML/MIN/1.73M*2
ERYTHROCYTE [DISTWIDTH] IN BLOOD BY AUTOMATED COUNT: 14.3 % (ref 11.5–14.5)
GLUCOSE SERPL-MCNC: 124 MG/DL (ref 74–99)
HCT VFR BLD AUTO: 35.6 % (ref 41–52)
HGB BLD-MCNC: 12.4 G/DL (ref 13.5–17.5)
HOLD SPECIMEN: NORMAL
MCH RBC QN AUTO: 34.2 PG (ref 26–34)
MCHC RBC AUTO-ENTMCNC: 34.8 G/DL (ref 32–36)
MCV RBC AUTO: 98 FL (ref 80–100)
NRBC BLD-RTO: 0 /100 WBCS (ref 0–0)
PLATELET # BLD AUTO: 414 X10*3/UL (ref 150–450)
POTASSIUM SERPL-SCNC: 4.2 MMOL/L (ref 3.5–5.3)
PROT SERPL-MCNC: 6.7 G/DL (ref 6.4–8.2)
RBC # BLD AUTO: 3.63 X10*6/UL (ref 4.5–5.9)
SODIUM SERPL-SCNC: 136 MMOL/L (ref 136–145)
WBC # BLD AUTO: 20.3 X10*3/UL (ref 4.4–11.3)

## 2024-07-09 PROCEDURE — 2500000004 HC RX 250 GENERAL PHARMACY W/ HCPCS (ALT 636 FOR OP/ED): Performed by: NURSE ANESTHETIST, CERTIFIED REGISTERED

## 2024-07-09 PROCEDURE — 85027 COMPLETE CBC AUTOMATED: CPT | Performed by: INTERNAL MEDICINE

## 2024-07-09 PROCEDURE — 3700000002 HC GENERAL ANESTHESIA TIME - EACH INCREMENTAL 1 MINUTE: Performed by: UROLOGY

## 2024-07-09 PROCEDURE — 2500000004 HC RX 250 GENERAL PHARMACY W/ HCPCS (ALT 636 FOR OP/ED): Performed by: INTERNAL MEDICINE

## 2024-07-09 PROCEDURE — 3600000008 HC OR TIME - EACH INCREMENTAL 1 MINUTE - PROCEDURE LEVEL THREE: Performed by: UROLOGY

## 2024-07-09 PROCEDURE — 99222 1ST HOSP IP/OBS MODERATE 55: CPT | Performed by: UROLOGY

## 2024-07-09 PROCEDURE — 0W3R8ZZ CONTROL BLEEDING IN GENITOURINARY TRACT, VIA NATURAL OR ARTIFICIAL OPENING ENDOSCOPIC: ICD-10-PCS | Performed by: UROLOGY

## 2024-07-09 PROCEDURE — 2500000005 HC RX 250 GENERAL PHARMACY W/O HCPCS: Performed by: ANESTHESIOLOGY

## 2024-07-09 PROCEDURE — 2500000004 HC RX 250 GENERAL PHARMACY W/ HCPCS (ALT 636 FOR OP/ED): Performed by: STUDENT IN AN ORGANIZED HEALTH CARE EDUCATION/TRAINING PROGRAM

## 2024-07-09 PROCEDURE — 7100000001 HC RECOVERY ROOM TIME - INITIAL BASE CHARGE: Performed by: UROLOGY

## 2024-07-09 PROCEDURE — 36415 COLL VENOUS BLD VENIPUNCTURE: CPT | Performed by: INTERNAL MEDICINE

## 2024-07-09 PROCEDURE — 93005 ELECTROCARDIOGRAM TRACING: CPT

## 2024-07-09 PROCEDURE — 7100000002 HC RECOVERY ROOM TIME - EACH INCREMENTAL 1 MINUTE: Performed by: UROLOGY

## 2024-07-09 PROCEDURE — 80053 COMPREHEN METABOLIC PANEL: CPT | Performed by: INTERNAL MEDICINE

## 2024-07-09 PROCEDURE — 2500000001 HC RX 250 WO HCPCS SELF ADMINISTERED DRUGS (ALT 637 FOR MEDICARE OP): Performed by: INTERNAL MEDICINE

## 2024-07-09 PROCEDURE — 2500000005 HC RX 250 GENERAL PHARMACY W/O HCPCS: Performed by: UROLOGY

## 2024-07-09 PROCEDURE — 0T9B8ZZ DRAINAGE OF BLADDER, VIA NATURAL OR ARTIFICIAL OPENING ENDOSCOPIC: ICD-10-PCS | Performed by: UROLOGY

## 2024-07-09 PROCEDURE — 3600000003 HC OR TIME - INITIAL BASE CHARGE - PROCEDURE LEVEL THREE: Performed by: UROLOGY

## 2024-07-09 PROCEDURE — 2500000004 HC RX 250 GENERAL PHARMACY W/ HCPCS (ALT 636 FOR OP/ED): Performed by: ANESTHESIOLOGY

## 2024-07-09 PROCEDURE — 1210000001 HC SEMI-PRIVATE ROOM DAILY

## 2024-07-09 PROCEDURE — 2720000007 HC OR 272 NO HCPCS: Performed by: UROLOGY

## 2024-07-09 PROCEDURE — 52214 CYSTOSCOPY AND TREATMENT: CPT | Performed by: UROLOGY

## 2024-07-09 PROCEDURE — 99223 1ST HOSP IP/OBS HIGH 75: CPT | Performed by: INTERNAL MEDICINE

## 2024-07-09 PROCEDURE — 3700000001 HC GENERAL ANESTHESIA TIME - INITIAL BASE CHARGE: Performed by: UROLOGY

## 2024-07-09 RX ORDER — ONDANSETRON 4 MG/1
4 TABLET, FILM COATED ORAL EVERY 8 HOURS PRN
Status: DISCONTINUED | OUTPATIENT
Start: 2024-07-09 | End: 2024-07-13 | Stop reason: HOSPADM

## 2024-07-09 RX ORDER — CEFAZOLIN SODIUM 2 G/50ML
SOLUTION INTRAVENOUS AS NEEDED
Status: DISCONTINUED | OUTPATIENT
Start: 2024-07-09 | End: 2024-07-09

## 2024-07-09 RX ORDER — PROPOFOL 10 MG/ML
INJECTION, EMULSION INTRAVENOUS AS NEEDED
Status: DISCONTINUED | OUTPATIENT
Start: 2024-07-09 | End: 2024-07-09

## 2024-07-09 RX ORDER — ACETAMINOPHEN 650 MG/1
650 SUPPOSITORY RECTAL EVERY 4 HOURS PRN
Status: DISCONTINUED | OUTPATIENT
Start: 2024-07-09 | End: 2024-07-13 | Stop reason: HOSPADM

## 2024-07-09 RX ORDER — LANOLIN ALCOHOL/MO/W.PET/CERES
400 CREAM (GRAM) TOPICAL DAILY
Status: DISCONTINUED | OUTPATIENT
Start: 2024-07-09 | End: 2024-07-13 | Stop reason: HOSPADM

## 2024-07-09 RX ORDER — FINASTERIDE 5 MG/1
5 TABLET, FILM COATED ORAL DAILY
Status: DISCONTINUED | OUTPATIENT
Start: 2024-07-09 | End: 2024-07-13 | Stop reason: HOSPADM

## 2024-07-09 RX ORDER — MORPHINE SULFATE 2 MG/ML
2 INJECTION, SOLUTION INTRAMUSCULAR; INTRAVENOUS
Status: DISCONTINUED | OUTPATIENT
Start: 2024-07-09 | End: 2024-07-13 | Stop reason: HOSPADM

## 2024-07-09 RX ORDER — HYDRALAZINE HYDROCHLORIDE 20 MG/ML
5 INJECTION INTRAMUSCULAR; INTRAVENOUS EVERY 30 MIN PRN
Status: DISCONTINUED | OUTPATIENT
Start: 2024-07-09 | End: 2024-07-09 | Stop reason: HOSPADM

## 2024-07-09 RX ORDER — PANTOPRAZOLE SODIUM 40 MG/1
40 TABLET, DELAYED RELEASE ORAL DAILY
Status: DISCONTINUED | OUTPATIENT
Start: 2024-07-09 | End: 2024-07-13 | Stop reason: HOSPADM

## 2024-07-09 RX ORDER — ONDANSETRON HYDROCHLORIDE 2 MG/ML
4 INJECTION, SOLUTION INTRAVENOUS ONCE AS NEEDED
Status: DISCONTINUED | OUTPATIENT
Start: 2024-07-09 | End: 2024-07-09 | Stop reason: HOSPADM

## 2024-07-09 RX ORDER — LABETALOL HYDROCHLORIDE 5 MG/ML
5 INJECTION, SOLUTION INTRAVENOUS ONCE AS NEEDED
Status: DISCONTINUED | OUTPATIENT
Start: 2024-07-09 | End: 2024-07-09 | Stop reason: HOSPADM

## 2024-07-09 RX ORDER — ETOMIDATE 2 MG/ML
INJECTION INTRAVENOUS AS NEEDED
Status: DISCONTINUED | OUTPATIENT
Start: 2024-07-09 | End: 2024-07-09

## 2024-07-09 RX ORDER — ALBUTEROL SULFATE 0.83 MG/ML
2.5 SOLUTION RESPIRATORY (INHALATION) ONCE AS NEEDED
Status: DISCONTINUED | OUTPATIENT
Start: 2024-07-09 | End: 2024-07-09 | Stop reason: HOSPADM

## 2024-07-09 RX ORDER — SODIUM CHLORIDE, SODIUM LACTATE, POTASSIUM CHLORIDE, CALCIUM CHLORIDE 600; 310; 30; 20 MG/100ML; MG/100ML; MG/100ML; MG/100ML
100 INJECTION, SOLUTION INTRAVENOUS CONTINUOUS
Status: DISCONTINUED | OUTPATIENT
Start: 2024-07-09 | End: 2024-07-09 | Stop reason: HOSPADM

## 2024-07-09 RX ORDER — DIPHENHYDRAMINE HYDROCHLORIDE 50 MG/ML
12.5 INJECTION INTRAMUSCULAR; INTRAVENOUS ONCE AS NEEDED
Status: DISCONTINUED | OUTPATIENT
Start: 2024-07-09 | End: 2024-07-09 | Stop reason: HOSPADM

## 2024-07-09 RX ORDER — PANTOPRAZOLE SODIUM 40 MG/10ML
40 INJECTION, POWDER, LYOPHILIZED, FOR SOLUTION INTRAVENOUS DAILY
Status: DISCONTINUED | OUTPATIENT
Start: 2024-07-09 | End: 2024-07-11

## 2024-07-09 RX ORDER — SODIUM CHLORIDE, SODIUM LACTATE, POTASSIUM CHLORIDE, CALCIUM CHLORIDE 600; 310; 30; 20 MG/100ML; MG/100ML; MG/100ML; MG/100ML
INJECTION, SOLUTION INTRAVENOUS CONTINUOUS PRN
Status: DISCONTINUED | OUTPATIENT
Start: 2024-07-09 | End: 2024-07-09

## 2024-07-09 RX ORDER — ACETAMINOPHEN 160 MG/5ML
650 SOLUTION ORAL EVERY 4 HOURS PRN
Status: DISCONTINUED | OUTPATIENT
Start: 2024-07-09 | End: 2024-07-13 | Stop reason: HOSPADM

## 2024-07-09 RX ORDER — LIDOCAINE HYDROCHLORIDE 20 MG/ML
1 JELLY TOPICAL ONCE
Status: COMPLETED | OUTPATIENT
Start: 2024-07-09 | End: 2024-07-09

## 2024-07-09 RX ORDER — ONDANSETRON HYDROCHLORIDE 2 MG/ML
INJECTION, SOLUTION INTRAVENOUS AS NEEDED
Status: DISCONTINUED | OUTPATIENT
Start: 2024-07-09 | End: 2024-07-09

## 2024-07-09 RX ORDER — TALC
3 POWDER (GRAM) TOPICAL NIGHTLY PRN
Status: DISCONTINUED | OUTPATIENT
Start: 2024-07-09 | End: 2024-07-13 | Stop reason: HOSPADM

## 2024-07-09 RX ORDER — ACETAMINOPHEN 325 MG/1
650 TABLET ORAL EVERY 4 HOURS PRN
Status: DISCONTINUED | OUTPATIENT
Start: 2024-07-09 | End: 2024-07-13 | Stop reason: HOSPADM

## 2024-07-09 RX ORDER — TAMSULOSIN HYDROCHLORIDE 0.4 MG/1
0.4 CAPSULE ORAL DAILY
Status: DISCONTINUED | OUTPATIENT
Start: 2024-07-09 | End: 2024-07-13 | Stop reason: HOSPADM

## 2024-07-09 RX ORDER — POLYETHYLENE GLYCOL 3350 17 G/17G
17 POWDER, FOR SOLUTION ORAL DAILY PRN
Status: DISCONTINUED | OUTPATIENT
Start: 2024-07-09 | End: 2024-07-13 | Stop reason: HOSPADM

## 2024-07-09 RX ORDER — FENTANYL CITRATE 50 UG/ML
25 INJECTION, SOLUTION INTRAMUSCULAR; INTRAVENOUS EVERY 5 MIN PRN
Status: DISCONTINUED | OUTPATIENT
Start: 2024-07-09 | End: 2024-07-09 | Stop reason: HOSPADM

## 2024-07-09 RX ORDER — LIDOCAINE HYDROCHLORIDE 20 MG/ML
1 JELLY TOPICAL ONCE
Status: DISCONTINUED | OUTPATIENT
Start: 2024-07-09 | End: 2024-07-13 | Stop reason: HOSPADM

## 2024-07-09 RX ORDER — ONDANSETRON HYDROCHLORIDE 2 MG/ML
4 INJECTION, SOLUTION INTRAVENOUS EVERY 8 HOURS PRN
Status: DISCONTINUED | OUTPATIENT
Start: 2024-07-09 | End: 2024-07-13 | Stop reason: HOSPADM

## 2024-07-09 RX ORDER — MEPERIDINE HYDROCHLORIDE 25 MG/ML
12.5 INJECTION INTRAMUSCULAR; INTRAVENOUS; SUBCUTANEOUS EVERY 10 MIN PRN
Status: DISCONTINUED | OUTPATIENT
Start: 2024-07-09 | End: 2024-07-09 | Stop reason: HOSPADM

## 2024-07-09 RX ORDER — FENTANYL CITRATE 50 UG/ML
INJECTION, SOLUTION INTRAMUSCULAR; INTRAVENOUS AS NEEDED
Status: DISCONTINUED | OUTPATIENT
Start: 2024-07-09 | End: 2024-07-09

## 2024-07-09 SDOH — SOCIAL STABILITY: SOCIAL INSECURITY: DOES ANYONE TRY TO KEEP YOU FROM HAVING/CONTACTING OTHER FRIENDS OR DOING THINGS OUTSIDE YOUR HOME?: NO

## 2024-07-09 SDOH — SOCIAL STABILITY: SOCIAL INSECURITY: ABUSE: ADULT

## 2024-07-09 SDOH — SOCIAL STABILITY: SOCIAL INSECURITY: HAVE YOU HAD THOUGHTS OF HARMING ANYONE ELSE?: YES

## 2024-07-09 SDOH — SOCIAL STABILITY: SOCIAL INSECURITY: DO YOU FEEL UNSAFE GOING BACK TO THE PLACE WHERE YOU ARE LIVING?: NO

## 2024-07-09 SDOH — SOCIAL STABILITY: SOCIAL INSECURITY: DO YOU FEEL ANYONE HAS EXPLOITED OR TAKEN ADVANTAGE OF YOU FINANCIALLY OR OF YOUR PERSONAL PROPERTY?: NO

## 2024-07-09 SDOH — SOCIAL STABILITY: SOCIAL INSECURITY: ARE THERE ANY APPARENT SIGNS OF INJURIES/BEHAVIORS THAT COULD BE RELATED TO ABUSE/NEGLECT?: NO

## 2024-07-09 SDOH — SOCIAL STABILITY: SOCIAL INSECURITY: HAS ANYONE EVER THREATENED TO HURT YOUR FAMILY OR YOUR PETS?: NO

## 2024-07-09 SDOH — SOCIAL STABILITY: SOCIAL INSECURITY: ARE YOU OR HAVE YOU BEEN THREATENED OR ABUSED PHYSICALLY, EMOTIONALLY, OR SEXUALLY BY ANYONE?: NO

## 2024-07-09 SDOH — SOCIAL STABILITY: SOCIAL INSECURITY: HAVE YOU HAD THOUGHTS OF HARMING ANYONE ELSE?: NO

## 2024-07-09 SDOH — SOCIAL STABILITY: SOCIAL INSECURITY: HAVE YOU HAD ANY THOUGHTS OF HARMING ANYONE ELSE?: NO

## 2024-07-09 SDOH — SOCIAL STABILITY: SOCIAL INSECURITY: WERE YOU ABLE TO COMPLETE ALL THE BEHAVIORAL HEALTH SCREENINGS?: YES

## 2024-07-09 SDOH — HEALTH STABILITY: MENTAL HEALTH: CURRENT SMOKER: 0

## 2024-07-09 ASSESSMENT — COGNITIVE AND FUNCTIONAL STATUS - GENERAL
PATIENT BASELINE BEDBOUND: NO
CLIMB 3 TO 5 STEPS WITH RAILING: TOTAL
DAILY ACTIVITIY SCORE: 16
DAILY ACTIVITIY SCORE: 21
DRESSING REGULAR LOWER BODY CLOTHING: A LOT
WALKING IN HOSPITAL ROOM: A LOT
TURNING FROM BACK TO SIDE WHILE IN FLAT BAD: A LITTLE
STANDING UP FROM CHAIR USING ARMS: A LITTLE
DRESSING REGULAR LOWER BODY CLOTHING: A LOT
MOBILITY SCORE: 12
PERSONAL GROOMING: A LITTLE
WALKING IN HOSPITAL ROOM: A LOT
MOVING FROM LYING ON BACK TO SITTING ON SIDE OF FLAT BED WITH BEDRAILS: A LITTLE
HELP NEEDED FOR BATHING: A LOT
MOVING TO AND FROM BED TO CHAIR: A LOT
STANDING UP FROM CHAIR USING ARMS: A LOT
TURNING FROM BACK TO SIDE WHILE IN FLAT BAD: A LOT
PERSONAL GROOMING: A LITTLE
TOILETING: A LOT
MOVING TO AND FROM BED TO CHAIR: A LITTLE
CLIMB 3 TO 5 STEPS WITH RAILING: A LOT
MOVING FROM LYING ON BACK TO SITTING ON SIDE OF FLAT BED WITH BEDRAILS: A LITTLE
DRESSING REGULAR UPPER BODY CLOTHING: A LITTLE
MOBILITY SCORE: 16

## 2024-07-09 ASSESSMENT — LIFESTYLE VARIABLES
SKIP TO QUESTIONS 9-10: 1
HOW OFTEN DO YOU HAVE 6 OR MORE DRINKS ON ONE OCCASION: NEVER
HOW OFTEN DO YOU HAVE A DRINK CONTAINING ALCOHOL: NEVER
HOW MANY STANDARD DRINKS CONTAINING ALCOHOL DO YOU HAVE ON A TYPICAL DAY: PATIENT DOES NOT DRINK
AUDIT-C TOTAL SCORE: 0
SKIP TO QUESTIONS 9-10: 1
AUDIT-C TOTAL SCORE: 0
HOW MANY STANDARD DRINKS CONTAINING ALCOHOL DO YOU HAVE ON A TYPICAL DAY: PATIENT DOES NOT DRINK
HOW OFTEN DO YOU HAVE 6 OR MORE DRINKS ON ONE OCCASION: NEVER
AUDIT-C TOTAL SCORE: 0
AUDIT-C TOTAL SCORE: 0
HOW OFTEN DO YOU HAVE A DRINK CONTAINING ALCOHOL: NEVER

## 2024-07-09 ASSESSMENT — ACTIVITIES OF DAILY LIVING (ADL)
WALKS IN HOME: INDEPENDENT
GROOMING: INDEPENDENT
FEEDING YOURSELF: INDEPENDENT
PATIENT'S MEMORY ADEQUATE TO SAFELY COMPLETE DAILY ACTIVITIES?: YES
BATHING: INDEPENDENT
TOILETING: INDEPENDENT
HEARING - RIGHT EAR: DIFFICULTY WITH NOISE
LACK_OF_TRANSPORTATION: NO
ASSISTIVE_DEVICE: WALKER;OTHER (COMMENT)
ADEQUATE_TO_COMPLETE_ADL: YES
HEARING - LEFT EAR: DIFFICULTY WITH NOISE
DRESSING YOURSELF: INDEPENDENT
LACK_OF_TRANSPORTATION: PATIENT DECLINED
JUDGMENT_ADEQUATE_SAFELY_COMPLETE_DAILY_ACTIVITIES: YES

## 2024-07-09 ASSESSMENT — PATIENT HEALTH QUESTIONNAIRE - PHQ9
1. LITTLE INTEREST OR PLEASURE IN DOING THINGS: NOT AT ALL
2. FEELING DOWN, DEPRESSED OR HOPELESS: NOT AT ALL
1. LITTLE INTEREST OR PLEASURE IN DOING THINGS: NOT AT ALL
SUM OF ALL RESPONSES TO PHQ9 QUESTIONS 1 & 2: 0
2. FEELING DOWN, DEPRESSED OR HOPELESS: NOT AT ALL
SUM OF ALL RESPONSES TO PHQ9 QUESTIONS 1 & 2: 0

## 2024-07-09 ASSESSMENT — PAIN - FUNCTIONAL ASSESSMENT
PAIN_FUNCTIONAL_ASSESSMENT: VAS (VISUAL ANALOG SCALE)
PAIN_FUNCTIONAL_ASSESSMENT: 0-10
PAIN_FUNCTIONAL_ASSESSMENT: VAS (VISUAL ANALOG SCALE)
PAIN_FUNCTIONAL_ASSESSMENT: 0-10
PAIN_FUNCTIONAL_ASSESSMENT: VAS (VISUAL ANALOG SCALE)
PAIN_FUNCTIONAL_ASSESSMENT: 0-10

## 2024-07-09 ASSESSMENT — COLUMBIA-SUICIDE SEVERITY RATING SCALE - C-SSRS
1. IN THE PAST MONTH, HAVE YOU WISHED YOU WERE DEAD OR WISHED YOU COULD GO TO SLEEP AND NOT WAKE UP?: NO
6. HAVE YOU EVER DONE ANYTHING, STARTED TO DO ANYTHING, OR PREPARED TO DO ANYTHING TO END YOUR LIFE?: NO
2. HAVE YOU ACTUALLY HAD ANY THOUGHTS OF KILLING YOURSELF?: NO

## 2024-07-09 ASSESSMENT — PAIN SCALES - GENERAL
PAINLEVEL_OUTOF10: 0 - NO PAIN
PAINLEVEL_OUTOF10: 10 - WORST POSSIBLE PAIN
PAINLEVEL_OUTOF10: 7
PAINLEVEL_OUTOF10: 0 - NO PAIN
PAINLEVEL_OUTOF10: 0 - NO PAIN
PAINLEVEL_OUTOF10: 4
PAINLEVEL_OUTOF10: 0 - NO PAIN

## 2024-07-09 ASSESSMENT — PAIN DESCRIPTION - LOCATION: LOCATION: OTHER (COMMENT)

## 2024-07-09 ASSESSMENT — PAIN DESCRIPTION - ORIENTATION: ORIENTATION: LOWER

## 2024-07-09 NOTE — SIGNIFICANT EVENT
Patient in no acute distress.   Patient seen and evaluated by urology who eventually did a cystoscopy after Bell had to be replaced due to significant clotting blocking flow of urine.  cystoscopy copy noted patient with massive BPH.  Patient had large clot burden within the bladder of well-formed clot roughly [id number] to 300 cc of clot evacuated.  There was diffuse erythema and injection of the prostate wall with diffuse bleeding at the bladder neck on the median lobe and lateral lobes.   Antibiotics were given during procedure per protocol.  Electrocautery to fulgurate all potential bleeding lesions was done.  Adequate hemostasis per visualization was obtained.    Will continue to monitor patient.  await further recommendations from urology.

## 2024-07-09 NOTE — ANESTHESIA PREPROCEDURE EVALUATION
Marco Antonio Merlos Jr. is a 79 y.o. male here for:    Cystoscopy with Evacuation Clots, fulguration  With Blayne Gupta MD  Pre-Op Diagnosis Codes:     * Blood in urine [R31.0]    Relevant Problems   Cardiac   (+) Rib pain on right side      GI   (+) Dysphagia      /Renal   (+) BPH with obstruction/lower urinary tract symptoms      Hematology   (+) Anemia   (+) Coagulation defect, unspecified (Multi)   (+) Thromboembolism (Multi)      Musculoskeletal   (+) Cervical spondylosis with myelopathy   (+) Lumbar spinal stenosis   (+) Primary osteoarthritis of right knee   (+) Scoliosis, unspecified   (+) Spinal stenosis, lumbar region without neurogenic claudication   (+) Stenosis, cervical spine       Lab Results   Component Value Date    HGB 12.4 (L) 07/09/2024    HGB 13.4 (A) 05/21/2024    HCT 35.6 (L) 07/09/2024    WBC 20.3 (H) 07/09/2024     07/09/2024     07/09/2024    K 4.2 07/09/2024     07/09/2024    CREATININE 1.02 07/09/2024    BUN 18 07/09/2024       Social History     Tobacco Use   Smoking Status Never   Smokeless Tobacco Never       No Known Allergies    Current Outpatient Medications   Medication Instructions    DULoxetine (Cymbalta) 30 mg DR capsule Take 1 capsule (30 mg) by mouth once daily for 7 days, THEN 2 capsules (60 mg) once daily. Do not crush or chew..    finasteride (PROSCAR) 5 mg, oral, Daily    magnesium oxide (MAG-OX) 400 mg, oral, Daily    melatonin 3 mg, oral, Nightly    tamsulosin (FLOMAX) 0.4 mg, oral, Daily       Past Surgical History:   Procedure Laterality Date    OTHER SURGICAL HISTORY  03/17/2022    Joint replacement procedure       No family history on file.    NPO Details:  No data recorded    Physical Exam    Airway  Mallampati: III  TM distance: >3 FB  Neck ROM: full     Cardiovascular - normal exam     Dental   (+) upper dentures, lower dentures     Pulmonary - normal exam     Abdominal            Anesthesia Plan    History of general anesthesia?: yes  History  of complications of general anesthesia?: no    ASA 3 - emergent     general     The patient is not a current smoker.    intravenous induction   Postoperative administration of opioids is intended.  Anesthetic plan and risks discussed with patient.

## 2024-07-09 NOTE — CARE PLAN
The patient's goals for the shift include pain at a tolerable level    The clinical goals for the shift include labs wnl    Over the shift, the patient did not make progress toward the following goals: none. Barriers to progression include n/a. Recommendations to address these barriers include continue current plan of care.

## 2024-07-09 NOTE — CONSULTS
Reason For Consult  Gross hematuria    History Of Present Illness  Marco Antonio Merlos Jr. is a 79 y.o. male with a history of Parkinson's disease, massive BPH, prior VTE not on anticoagulation, hard of hearing, PSA in 80s who is currently admitted with severe gross hematuria.  Patient presented to [facility] yesterday where he was found to have severe gross hematuria preventing urination.  I was called from the transfer center requesting admission at [facility] for further management due to lack of urologic care at [facility].  In the ER, a 22 Sami catheter was placed and the patient was started on continuous bladder irrigation.  Of note, patient has a 250 mL prostate with known urinary retention and a chronic indwelling Bell catheter.  He is scheduled to see Dr. Huffman in early August to discuss potential management options.  Patient does not report taking any anticoagulation or blood thinners, no trauma, no dislodged catheter.    Since arrival, patient has clotted off his catheter multiple times despite significant attempts by nursing to manually irrigate.  He intermittently is feeling severe pain with obstruction of the catheter secondary to clots.  No chest pain or shortness of breath, no lightheadedness, nausea, vomiting.    On arrival to ER:  UA with small leuk esterase, nitrite negative, grossly bloody, greater than 100 RBCs  WBC 9.3  Hemoglobin 12.2  Creatinine 1.01  PTT 30.7, INR 14.6    Past Medical History  He has a past medical history of Personal history of other diseases of the musculoskeletal system and connective tissue (03/17/2022) and Personal history of other specified conditions (03/17/2022).    Surgical History  He has a past surgical history that includes Other surgical history (03/17/2022).     Social History  He reports that he has never smoked. He has never used smokeless tobacco. He reports that he does not drink alcohol and does not use drugs.    Family History  No family  "history on file.     Allergies  Patient has no known allergies.    Review of Systems  A 12 system review was completed and is negative with the exception of those signs and symptoms noted in the history of present illness.     Physical Exam  General: in NAD, appears older than stated age, frail  Head: normocephalic, atraumatic  Respiratory: normal effort, no use of accessory muscles  Cardiovascular: no edema noted  Skin: normal turgor, no rashes  Neurologic: Hard of hearing and therefore hard to assess as patient does not respond to commands easily  Psychiatric: mode and affect appropriate  24 Montenegrin three-way catheter placed, irrigated significant clot burden.  Difficult to irrigate clots.  With restarting CBI, clears quickly but intermittently occludes.     Last Recorded Vitals  Blood pressure 101/66, pulse 102, temperature 36.6 °C (97.9 °F), resp. rate 20, height 1.65 m (5' 4.96\"), weight 70.8 kg (156 lb 1.4 oz), SpO2 94%.    Relevant Results      See above     Assessment/Plan   79-year-old male with severely enlarged prostate and gross hematuria with clot retention.    # Clot retention, hematuria, prostate enlargement  -Prostate is massively enlarged, suspect this is the source of bleeding  -Continue finasteride  -Plan for OR today for cystoscopy, clot evacuation, fulguration  -If we are unable to control bleeding cystoscopically, patient would potentially be a candidate for prostatic artery embolization if his blood vessels are amenable  -Patient will be continued on bladder irrigation postoperatively      Blayne Gupta MD    "

## 2024-07-09 NOTE — OP NOTE
Cystoscopy with Evacuation Clots, fulguration Operative Note     Date: 2024  OR Location: [facility] OR    Name: Marco Antonio Merlos Jr., : 1945, Age: 79 y.o., MRN: [id number], Sex: male    Diagnosis  Pre-op Diagnosis     * Gross hematuria [R31.0] Post-op Diagnosis     * Gross hematuria [R31.0]     Procedures  Cystoscopy with Evacuation Clots, fulguration  42810 - NM CYSTO W/IRRIG & EVAC MULTPLE OBSTRUCTING CLOTS      Surgeons      * Blayne Gupta - Primary    Resident/Fellow/Other Assistant:  Surgeons and Role:  * No surgeons found with a matching role *    Procedure Summary  Anesthesia: General  ASA: III  Anesthesia Staff: Anesthesiologist: Prem Sparks DO; Fredy Leal MD  CRNA: JOSSY Padron-CRNA  Estimated Blood Loss: 250 mL (of old blood)  Intra-op Medications: Administrations occurring from 1415 to 1500 on 24:  * No intraprocedure medications in log *           Anesthesia Record               Intraprocedure I/O Totals          Intake    LR infusion 700.00 mL    Total Intake 700 mL       Output    Est. Blood Loss 250 mL    Total Output 250 mL       Net    Net Volume 450 mL          Specimen: No specimens collected     Staff:   Circulator: Sarah Manuel Person: Luis Alberto    Operative Indications: 79-year-old male with massive BPH admitted with gross hematuria and clot retention requiring manual irrigation on the floor with difficulty maintaining patency of the catheter.  Therefore I recommended proceeding with clot evacuation and fulguration to manage his bleeding.  The patient was counseled regarding the risks, benefits, alternatives, equipment, and personnel involved and consented to proceed with surgical intervention.    Operative Findings: Large clot burden within the bladder of well-formed clot, roughly 250 to 300 cc of clot evacuated.  Diffuse erythema and injection of the prostate wall.  Diffuse bleeding at the bladder neck and of the median lobes and lateral lobes.    Operative  Procedure:   The patient was correctly identified and the operative plan was confirmed with the patient and the operative team. A weight appropriate dose of prophylactic antibiotics was administered intravenously prior to the procedure and sequential compression devices were applied to the lower extremities and activated prior to induction of anesthesia. The patient was placed in supine position. General anesthesia was induced. The patient was repositioned in dorsal lithotomy position. All pressure points were padded per protocol and the operative area was prepped and draped in the usual sterile fashion.    The 26F rigid resectoscope was inserted with the visual obturator and complete cystourethroscopy was performed.  Within the prostate I immediately noted diffuse bleeding from the lateral lobes as well as from the median lobe.  Within the bladder there was substantial well-formed clot.  Using the Ellik device all of the blood clots were successfully irrigated out until the bladder was clear of any remaining clots.  I then used the plasma button with bipolar electrocautery to fulgurate all potential bleeding lesions.  I then examined the prostate under low flow irrigation and low-pressure and cannot identify any additional bleeding.  Hemostasis appeared adequate.    A 24 Slovak hematuria catheter was placed with 50 cc in the balloon and tension on the catheter.  The irrigant was clear at this point.  This concluded the procedure.    I performed the entire procedure.     MD Blayne Riddle  Phone Number: [phone]

## 2024-07-09 NOTE — ANESTHESIA POSTPROCEDURE EVALUATION
Patient: Marco Antonio Merlos Jr.    Procedure Summary       Date: 07/09/24 Room / Location: [facility] OR 10 / [facility]    Anesthesia Start: 1505 Anesthesia Stop: 1608    Procedure: Cystoscopy with Evacuation Clots, fulguration Diagnosis:       Gross hematuria      (Gross hematuria [R31.0])    Surgeons: Blayne Gupta MD Responsible Provider: Prem Sparks DO    Anesthesia Type: general ASA Status: 3 - Emergent            Anesthesia Type: general    Vitals Value Taken Time   BP 91/45 07/09/24 1605   Temp 36 07/09/24 1608   Pulse 76 07/09/24 1607   Resp 12 07/09/24 1608   SpO2 92 % 07/09/24 1607   Vitals shown include unfiled device data.    Anesthesia Post Evaluation    Patient location during evaluation: PACU  Patient participation: waiting for patient participation  Level of consciousness: responsive to light touch  Pain management: adequate  Airway patency: patent  Cardiovascular status: acceptable  Respiratory status: acceptable and room air  Hydration status: acceptable  Postoperative Nausea and Vomiting: none      No notable events documented.

## 2024-07-09 NOTE — PROGRESS NOTES
07/09/24 1323   Discharge Planning   Living Arrangements Alone   Support Systems Family members   Type of Residence Private residence   Who is requesting discharge planning? Provider   Home or Post Acute Services In home services   Type of Home Care Services Home nursing visits   Patient expects to be discharged to: Home/active [facility] for holley cath care   Does the patient need discharge transport arranged? Yes   RoundTrip coordination needed? Yes   Housing Stability   In the last 12 months, was there a time when you were not able to pay the mortgage or rent on time? Pt Declined   In the last 12 months, was there a time when you did not have a steady place to sleep or slept in a shelter (including now)? Pt Declined   Transportation Needs   In the past 12 months, has lack of transportation kept you from medical appointments or from getting medications? Pt Declined   In the past 12 months, has lack of transportation kept you from meetings, work, or from getting things needed for daily living? Pt Declined   Patient Choice   Patient / Family choosing to utilize agency / facility established prior to hospitalization Yes     Met with pt & explained my role. Pt active with [facility] , for holley cath care. Pt admitted with hematuria. Holley cath with glycine irrigation. Urology on consult. Pt reports ambulatory at home , has walker & cane. Does not drive. Not clear about transportation. Support from son & DIL. Verified PCP & address. Care transitions team to cont to follow for dc needs, case progression. [facility] of [facility].

## 2024-07-09 NOTE — CARE PLAN
The patient's goals for the shift include      The clinical goals for the shift include hemodynamically stable      Problem: Pain - Adult  Goal: Verbalizes/displays adequate comfort level or baseline comfort level  Outcome: Progressing     Problem: Safety - Adult  Goal: Free from fall injury  Outcome: Progressing     Problem: Discharge Planning  Goal: Discharge to home or other facility with appropriate resources  Outcome: Progressing     Problem: Chronic Conditions and Co-morbidities  Goal: Patient's chronic conditions and co-morbidity symptoms are monitored and maintained or improved  Outcome: Progressing     Problem: Fall/Injury  Goal: Not fall by end of shift  Outcome: Progressing  Goal: Be free from injury by end of the shift  Outcome: Progressing  Goal: Verbalize understanding of personal risk factors for fall in the hospital  Outcome: Progressing  Goal: Verbalize understanding of risk factor reduction measures to prevent injury from fall in the home  Outcome: Progressing  Goal: Use assistive devices by end of the shift  Outcome: Progressing  Goal: Pace activities to prevent fatigue by end of the shift  Outcome: Progressing

## 2024-07-09 NOTE — H&P
History Of Present Illness  Marco Antonio Merlos Jr. is a 79 y.o. male who was transferred from [facility] to this facility for gross hematuria management.  Patient presented with gross hematuria for 1 day.  He reported that his Bell catheter was clogged completely with a blood clot.  Patient has history of benign prostatic hypertrophy.  Recently was not able to urinate so Bell catheter was placed about 1 month ago.  He needed his Bell to be replaced few days ago because it was not draining.  Patient denies fever, chills, nausea, vomiting or diarrhea.  He is not on anticoagulation.    ER course: Patient was awake, alert, oriented, no acute distress.  He was hemodynamically stable.  There is no leukocytosis.  Three-way interrogation Bell catheter was placed.  Continuous irrigation was started.  Dr. Olmstead urologist was consulted.  Patient transferred for further management.     Past Medical History  Past Medical History:   Diagnosis Date    Personal history of other diseases of the musculoskeletal system and connective tissue 03/17/2022    History of arthritis    Personal history of other specified conditions 03/17/2022    History of balance disorder       Surgical History  Past Surgical History:   Procedure Laterality Date    OTHER SURGICAL HISTORY  03/17/2022    Joint replacement procedure        Social History  He reports that he has never smoked. He has never used smokeless tobacco. He reports that he does not drink alcohol and does not use drugs.    Family History  No family history on file.     Allergies  Patient has no known allergies.    Review of Systems  10/10 points review of system were conducted, negative except as above    Physical Exam  Constitutional:       Appearance: Normal appearance. He is not ill-appearing or diaphoretic.   HENT:      Head: Normocephalic and atraumatic.      Mouth/Throat:      Mouth: Mucous membranes are moist.   Eyes:      General: No scleral icterus.     Extraocular  "Movements: Extraocular movements intact.      Conjunctiva/sclera: Conjunctivae normal.      Pupils: Pupils are equal, round, and reactive to light.   Cardiovascular:      Rate and Rhythm: Normal rate and regular rhythm.      Heart sounds: Murmur heard.   Pulmonary:      Effort: No respiratory distress.      Breath sounds: No stridor. No wheezing, rhonchi or rales.   Chest:      Chest wall: No tenderness.   Abdominal:      General: There is no distension.      Palpations: There is no mass.      Tenderness: There is abdominal tenderness. There is no right CVA tenderness, left CVA tenderness, guarding or rebound.      Hernia: No hernia is present.   Genitourinary:     Comments: Bell catheter is in place.  There is purulent discharge around the catheter.  Musculoskeletal:         General: Swelling present. No tenderness.      Cervical back: Normal range of motion and neck supple. No rigidity or tenderness.      Right lower leg: No edema.      Left lower leg: No edema.   Skin:     General: Skin is warm and dry.   Neurological:      General: No focal deficit present.      Mental Status: He is alert and oriented to person, place, and time. Mental status is at baseline.      Comments: Patient does have resting tremor from Parkinson's   Psychiatric:         Mood and Affect: Mood normal.         Behavior: Behavior normal.          Last Recorded Vitals  Blood pressure 127/66, pulse 93, temperature 36.4 °C (97.5 °F), temperature source Temporal, resp. rate 20, height 1.65 m (5' 4.96\"), weight 70.8 kg (156 lb 1.4 oz), SpO2 96%.    Relevant Results  Scheduled medications  finasteride, 5 mg, oral, Daily  magnesium oxide, 400 mg, oral, Daily  pantoprazole, 40 mg, oral, Daily   Or  pantoprazole, 40 mg, intravenous, Daily  tamsulosin, 0.4 mg, oral, Daily      Continuous medications     PRN medications  PRN medications: acetaminophen **OR** acetaminophen **OR** acetaminophen, melatonin, ondansetron **OR** ondansetron, polyethylene " glycol            Assessment/Plan   Principal Problem:    Gross hematuria  Active Problems:    BPH with obstruction/lower urinary tract symptoms    Restless legs syndrome    Parkinson disease (Multi)    -Continue three-way bladder irrigation.  -No chemical DVT prophylaxis for now.  SCD for DVT prophylaxis.  -Urology consult.  -Patient does have purulent discharge around the Bell catheter however he does not have symptoms of UTI so we will leave off antibiotics.  -Confirm and resume home medication for Parkinson and restless leg syndrome.  -Repeat labs including CBC    Calos Belcher MD

## 2024-07-10 ENCOUNTER — HOME CARE VISIT (OUTPATIENT)
Dept: HOME HEALTH SERVICES | Facility: HOME HEALTH | Age: 79
End: 2024-07-10
Payer: MEDICARE

## 2024-07-10 LAB
ALBUMIN SERPL BCP-MCNC: 3.2 G/DL (ref 3.4–5)
ALP SERPL-CCNC: 74 U/L (ref 33–136)
ALT SERPL W P-5'-P-CCNC: 7 U/L (ref 10–52)
ANION GAP SERPL CALC-SCNC: 9 MMOL/L (ref 10–20)
AST SERPL W P-5'-P-CCNC: 12 U/L (ref 9–39)
BILIRUB SERPL-MCNC: 0.7 MG/DL (ref 0–1.2)
BUN SERPL-MCNC: 25 MG/DL (ref 6–23)
CALCIUM SERPL-MCNC: 8.5 MG/DL (ref 8.6–10.3)
CHLORIDE SERPL-SCNC: 104 MMOL/L (ref 98–107)
CO2 SERPL-SCNC: 25 MMOL/L (ref 21–32)
CREAT SERPL-MCNC: 1.02 MG/DL (ref 0.5–1.3)
EGFRCR SERPLBLD CKD-EPI 2021: 74 ML/MIN/1.73M*2
ERYTHROCYTE [DISTWIDTH] IN BLOOD BY AUTOMATED COUNT: 14.7 % (ref 11.5–14.5)
GLUCOSE SERPL-MCNC: 111 MG/DL (ref 74–99)
HCT VFR BLD AUTO: 28.4 % (ref 41–52)
HGB BLD-MCNC: 9.7 G/DL (ref 13.5–17.5)
HOLD SPECIMEN: NORMAL
MCH RBC QN AUTO: 34.4 PG (ref 26–34)
MCHC RBC AUTO-ENTMCNC: 34.2 G/DL (ref 32–36)
MCV RBC AUTO: 101 FL (ref 80–100)
NRBC BLD-RTO: 0 /100 WBCS (ref 0–0)
PLATELET # BLD AUTO: 312 X10*3/UL (ref 150–450)
POTASSIUM SERPL-SCNC: 4.3 MMOL/L (ref 3.5–5.3)
PROT SERPL-MCNC: 5.6 G/DL (ref 6.4–8.2)
RBC # BLD AUTO: 2.82 X10*6/UL (ref 4.5–5.9)
SODIUM SERPL-SCNC: 134 MMOL/L (ref 136–145)
WBC # BLD AUTO: 15.4 X10*3/UL (ref 4.4–11.3)

## 2024-07-10 PROCEDURE — 2500000004 HC RX 250 GENERAL PHARMACY W/ HCPCS (ALT 636 FOR OP/ED): Performed by: NURSE PRACTITIONER

## 2024-07-10 PROCEDURE — 99232 SBSQ HOSP IP/OBS MODERATE 35: CPT | Performed by: UROLOGY

## 2024-07-10 PROCEDURE — 99232 SBSQ HOSP IP/OBS MODERATE 35: CPT | Performed by: STUDENT IN AN ORGANIZED HEALTH CARE EDUCATION/TRAINING PROGRAM

## 2024-07-10 PROCEDURE — 84075 ASSAY ALKALINE PHOSPHATASE: CPT | Performed by: STUDENT IN AN ORGANIZED HEALTH CARE EDUCATION/TRAINING PROGRAM

## 2024-07-10 PROCEDURE — C9113 INJ PANTOPRAZOLE SODIUM, VIA: HCPCS | Performed by: NURSE PRACTITIONER

## 2024-07-10 PROCEDURE — 85027 COMPLETE CBC AUTOMATED: CPT | Performed by: STUDENT IN AN ORGANIZED HEALTH CARE EDUCATION/TRAINING PROGRAM

## 2024-07-10 PROCEDURE — 36415 COLL VENOUS BLD VENIPUNCTURE: CPT | Performed by: STUDENT IN AN ORGANIZED HEALTH CARE EDUCATION/TRAINING PROGRAM

## 2024-07-10 PROCEDURE — 1210000001 HC SEMI-PRIVATE ROOM DAILY

## 2024-07-10 RX ORDER — OXYCODONE HYDROCHLORIDE 5 MG/1
10 TABLET ORAL EVERY 4 HOURS PRN
Status: DISCONTINUED | OUTPATIENT
Start: 2024-07-10 | End: 2024-07-13 | Stop reason: HOSPADM

## 2024-07-10 RX ORDER — OXYCODONE HYDROCHLORIDE 5 MG/1
5 TABLET ORAL EVERY 4 HOURS PRN
Status: DISCONTINUED | OUTPATIENT
Start: 2024-07-10 | End: 2024-07-13 | Stop reason: HOSPADM

## 2024-07-10 ASSESSMENT — COGNITIVE AND FUNCTIONAL STATUS - GENERAL
MOVING TO AND FROM BED TO CHAIR: A LOT
HELP NEEDED FOR BATHING: A LITTLE
DAILY ACTIVITIY SCORE: 21
HELP NEEDED FOR BATHING: A LITTLE
HELP NEEDED FOR BATHING: A LITTLE
MOBILITY SCORE: 12
TURNING FROM BACK TO SIDE WHILE IN FLAT BAD: A LITTLE
DAILY ACTIVITIY SCORE: 19
TOILETING: A LITTLE
TURNING FROM BACK TO SIDE WHILE IN FLAT BAD: A LITTLE
STANDING UP FROM CHAIR USING ARMS: A LOT
MOVING TO AND FROM BED TO CHAIR: A LOT
PERSONAL GROOMING: A LITTLE
DRESSING REGULAR LOWER BODY CLOTHING: A LITTLE
WALKING IN HOSPITAL ROOM: TOTAL
MOVING FROM LYING ON BACK TO SITTING ON SIDE OF FLAT BED WITH BEDRAILS: A LITTLE
DRESSING REGULAR UPPER BODY CLOTHING: A LITTLE
DRESSING REGULAR UPPER BODY CLOTHING: A LITTLE
PERSONAL GROOMING: A LITTLE
WALKING IN HOSPITAL ROOM: TOTAL
MOBILITY SCORE: 10
MOVING FROM LYING ON BACK TO SITTING ON SIDE OF FLAT BED WITH BEDRAILS: A LITTLE
CLIMB 3 TO 5 STEPS WITH RAILING: TOTAL
TURNING FROM BACK TO SIDE WHILE IN FLAT BAD: A LITTLE
DRESSING REGULAR UPPER BODY CLOTHING: A LITTLE
MOBILITY SCORE: 12
TOILETING: A LITTLE
MOVING TO AND FROM BED TO CHAIR: A LOT
DAILY ACTIVITIY SCORE: 19
DRESSING REGULAR LOWER BODY CLOTHING: A LOT
DAILY ACTIVITIY SCORE: 17
TOILETING: A LITTLE
TOILETING: A LITTLE
DRESSING REGULAR UPPER BODY CLOTHING: A LITTLE
STANDING UP FROM CHAIR USING ARMS: A LOT
CLIMB 3 TO 5 STEPS WITH RAILING: TOTAL
CLIMB 3 TO 5 STEPS WITH RAILING: TOTAL
PERSONAL GROOMING: A LITTLE
MOVING TO AND FROM BED TO CHAIR: A LOT
HELP NEEDED FOR BATHING: A LOT
STANDING UP FROM CHAIR USING ARMS: A LOT
MOVING FROM LYING ON BACK TO SITTING ON SIDE OF FLAT BED WITH BEDRAILS: A LITTLE
MOBILITY SCORE: 12
STANDING UP FROM CHAIR USING ARMS: A LOT
MOVING FROM LYING ON BACK TO SITTING ON SIDE OF FLAT BED WITH BEDRAILS: A LOT
DRESSING REGULAR LOWER BODY CLOTHING: A LITTLE
CLIMB 3 TO 5 STEPS WITH RAILING: TOTAL
WALKING IN HOSPITAL ROOM: TOTAL
TURNING FROM BACK TO SIDE WHILE IN FLAT BAD: A LOT
WALKING IN HOSPITAL ROOM: TOTAL

## 2024-07-10 ASSESSMENT — PAIN SCALES - GENERAL
PAINLEVEL_OUTOF10: 0 - NO PAIN

## 2024-07-10 ASSESSMENT — PAIN - FUNCTIONAL ASSESSMENT
PAIN_FUNCTIONAL_ASSESSMENT: 0-10
PAIN_FUNCTIONAL_ASSESSMENT: 0-10

## 2024-07-10 NOTE — CARE PLAN
The patient's goals for the shift include pain at a tolerable level    The clinical goals for the shift include labs wnl      Problem: Pain - Adult  Goal: Verbalizes/displays adequate comfort level or baseline comfort level  Outcome: Progressing     Problem: Safety - Adult  Goal: Free from fall injury  Outcome: Progressing     Problem: Discharge Planning  Goal: Discharge to home or other facility with appropriate resources  Outcome: Progressing     Problem: Chronic Conditions and Co-morbidities  Goal: Patient's chronic conditions and co-morbidity symptoms are monitored and maintained or improved  Outcome: Progressing     Problem: Fall/Injury  Goal: Not fall by end of shift  Outcome: Progressing  Goal: Be free from injury by end of the shift  Outcome: Progressing  Goal: Verbalize understanding of personal risk factors for fall in the hospital  Outcome: Progressing  Goal: Verbalize understanding of risk factor reduction measures to prevent injury from fall in the home  Outcome: Progressing  Goal: Use assistive devices by end of the shift  Outcome: Progressing  Goal: Pace activities to prevent fatigue by end of the shift  Outcome: Progressing     Problem: Skin  Goal: Decreased wound size/increased tissue granulation at next dressing change  Outcome: Progressing  Goal: Participates in plan/prevention/treatment measures  Outcome: Progressing  Goal: Prevent/manage excess moisture  Outcome: Progressing  Goal: Prevent/minimize sheer/friction injuries  Outcome: Progressing  Goal: Promote/optimize nutrition  Outcome: Progressing  Goal: Promote skin healing  Outcome: Progressing

## 2024-07-10 NOTE — CARE PLAN
The patient's goals for the shift include pain at a tolerable level    The clinical goals for the shift include decreased hematuria    Over the shift, the patient did make progress toward the following goals. Pt had no c/o pain this shift and had CBI disconnected d/t clear yellow urine.

## 2024-07-10 NOTE — PROGRESS NOTES
Marco Antonio Merlos Jr. is a 79 y.o. male on day 1 of admission presenting with Gross hematuria.    Subjective   Postoperative day #1 status post cystoscopy and clot evacuation.  Urine is now clear.  Patient denies any suprapubic discomfort.  He is resting comfortably in bed.       Objective     Last Recorded Vitals  Vitals:    07/10/24 1212   BP: 114/62   Pulse: 74   Resp: 18   Temp: 36.9 °C (98.4 °F)   SpO2: 97%       Intake/Output last 3 Shifts:  I/O last 3 completed shifts:  In: 700 (9.9 mL/kg) [I.V.:700 (9.9 mL/kg)]  Out: 6850 (96.8 mL/kg) [Urine:6600 (2.6 mL/kg/hr); Blood:250]  Weight: 70.8 kg     Exam:  General: in NAD, appears stated age  Head: normocephalic, atraumatic  Respiratory: normal effort, no use of accessory muscles  Cardiovascular: no edema noted  Abdomen: Soft, nondistended, appropriately tender, no rebound or guarding     Urine: Clear  Neurologic: grossly intact, oriented to person/place/time  Psychiatric: mode and affect appropriate      Relevant Results    Lab Results   Component Value Date    WBC 15.4 (H) 07/10/2024    HGB 9.7 (L) 07/10/2024    HCT 28.4 (L) 07/10/2024     (H) 07/10/2024     07/10/2024     Lab Results   Component Value Date    GLUCOSE 111 (H) 07/10/2024    CALCIUM 8.5 (L) 07/10/2024     (L) 07/10/2024    K 4.3 07/10/2024    CO2 25 07/10/2024     07/10/2024    BUN 25 (H) 07/10/2024    CREATININE 1.02 07/10/2024              Assessment/Plan   Principal Problem:    Gross hematuria  Active Problems:    BPH with obstruction/lower urinary tract symptoms    Restless legs syndrome    Parkinson disease (Multi)    Gross hematuria resolved.  Has BPH and urinary retention.  Plan to maintain catheter on discharge and follow-up with Dr. Huffman to discuss further management of his urinary retention      Bethel Gomez MD

## 2024-07-10 NOTE — PROGRESS NOTES
Medical Group Progress Note  ASSESSMENT & PLAN:     Principal Problem:    Gross hematuria  Active Problems:    BPH with obstruction/lower urinary tract symptoms    Restless legs syndrome    Parkinson disease (Multi)     -Continue three-way bladder irrigation.  -No chemical DVT prophylaxis for now.  SCD for DVT prophylaxis.  -Urology consult.  -Patient does have purulent discharge around the Bell catheter however he does not have symptoms of UTI so we will leave off antibiotics.  -Confirm and resume home medication for Parkinson and restless leg syndrome.  -Repeat labs including CBC          07/10    Patient seen and evaluated by urology who eventually did a cystoscopy after Bell had to be replaced due to significant clotting blocking flow of urine.  cystoscopy copy noted patient with massive BPH.  Patient had large clot burden within the bladder of well-formed clot roughly [id number] to 300 cc of clot evacuated.  There was diffuse erythema and injection of the prostate wall with diffuse bleeding at the bladder neck on the median lobe and lateral lobes.   Antibiotics were given during procedure per protocol.  Electrocautery to fulgurate all potential bleeding lesions was done.  Adequate hemostasis per visualization was obtained.  patient remained stable today and in no acute distress.  No signs of hematuria suspect that patient will be able to be discharged in next 24 hours    Will continue to monitor patient.  await further recommendations from urology.        Total time >35 minutes; > 50% spent counseling/coordinating care    -----This note was prepared using Dragon Medical voice recognition software. As a result, errors may occur. When identified, these errors have been corrected. While every attempt is made to correct errors during dictation, errors may still exist.------    Rohit Frye MD    SUBJECTIVE       No acute events overnight.  Denies any further bleeding from the Bell.  Denies any pain or  burning.    OBJECTIVE:     Last Recorded Vitals:  Vitals:    07/09/24 1844 07/09/24 1925 07/10/24 0519 07/10/24 1212   BP: 122/71 113/68 128/67 114/62   BP Location: Right arm Right arm Right arm Right arm   Patient Position: Lying Sitting Lying Lying   Pulse: 103 100 66 74   Resp: 18 18 16 18   Temp: 36.9 °C (98.4 °F) 36.6 °C (97.9 °F) 36.7 °C (98.1 °F) 36.9 °C (98.4 °F)   TempSrc: Temporal Temporal Temporal Temporal   SpO2: 93% 96% 96% 97%   Weight:       Height:         Last I/O:  I/O last 3 completed shifts:  In: 700 (9.9 mL/kg) [I.V.:700 (9.9 mL/kg)]  Out: 6850 (96.8 mL/kg) [Urine:6600 (2.6 mL/kg/hr); Blood:250]  Weight: 70.8 kg     Physical Exam    Constitutional:       Appearance: Normal appearance. He is not ill-appearing or diaphoretic.   HENT:      Head: Normocephalic and atraumatic.      Mouth/Throat:      Mouth: Mucous membranes are moist.   Eyes:      General: No scleral icterus.     Extraocular Movements: Extraocular movements intact.      Conjunctiva/sclera: Conjunctivae normal.      Pupils: Pupils are equal, round, and reactive to light.   Cardiovascular:      Rate and Rhythm: Normal rate and regular rhythm.      Heart sounds: Murmur heard.   Pulmonary:      Effort: No respiratory distress.      Breath sounds: No stridor. No wheezing, rhonchi or rales.   Chest:      Chest wall: No tenderness.   Abdominal:      General: There is no distension.      Palpations: There is no mass.      Tenderness: There is abdominal tenderness. There is no right CVA tenderness, left CVA tenderness, guarding or rebound.      Hernia: No hernia is present.    Bell in place draining slightly cloudy yellow urine.  Musculoskeletal:         General: Swelling present. No tenderness.      Cervical back: Normal range of motion and neck supple. No rigidity or tenderness.      Right lower leg: No edema.      Left lower leg: No edema.   Skin:     General: Skin is warm and dry.   Neurological:      General: No focal deficit present.       Mental Status: He is alert and oriented to person, place, and time. Mental status is at baseline.      Comments: Patient does have resting tremor from Parkinson's   Psychiatric:         Mood and Affect: Mood normal.         Behavior: Behavior normal.        Inpatient Medications:  finasteride, 5 mg, oral, Daily  lidocaine, 1 Application, urethral, Once  magnesium oxide, 400 mg, oral, Daily  pantoprazole, 40 mg, oral, Daily   Or  pantoprazole, 40 mg, intravenous, Daily  tamsulosin, 0.4 mg, oral, Daily    PRN Medications  PRN medications: acetaminophen **OR** acetaminophen **OR** acetaminophen, melatonin, morphine, ondansetron **OR** ondansetron, oxyCODONE, oxyCODONE, oxyCODONE, polyethylene glycol, promethazine (Phenergan) 6.25 mg in sodium chloride 0.9% 50 mL IV  Continuous Medications:     LABS AND IMAGING:     Labs:  Results from last 7 days   Lab Units 07/10/24  0809 07/09/24  0629   WBC AUTO x10*3/uL 15.4* 20.3*   RBC AUTO x10*6/uL 2.82* 3.63*   HEMOGLOBIN g/dL 9.7* 12.4*   HEMATOCRIT % 28.4* 35.6*   MCV fL 101* 98   MCH pg 34.4* 34.2*   MCHC g/dL 34.2 34.8   RDW % 14.7* 14.3   PLATELETS AUTO x10*3/uL 312 414     Results from last 7 days   Lab Units 07/10/24  0809 07/09/24  0629   SODIUM mmol/L 134* 136   POTASSIUM mmol/L 4.3 4.2   CHLORIDE mmol/L 104 106   CO2 mmol/L 25 20*   BUN mg/dL 25* 18   CREATININE mg/dL 1.02 1.02   GLUCOSE mg/dL 111* 124*   PROTEIN TOTAL g/dL 5.6* 6.7   CALCIUM mg/dL 8.5* 8.8   BILIRUBIN TOTAL mg/dL 0.7 0.9   ALK PHOS U/L 74 94   AST U/L 12 15   ALT U/L 7* 10             Imaging:  ECG 12 Lead  Sinus tachycardia  Inferior infarct (cited on or before 10-DEC-2022)  Abnormal ECG  When compared with ECG of 10-DEC-2022 20:15,  No significant change was found

## 2024-07-11 LAB
APPEARANCE UR: ABNORMAL
ATRIAL RATE: 110 BPM
BACTERIA #/AREA URNS AUTO: ABNORMAL /HPF
BILIRUB UR STRIP.AUTO-MCNC: NEGATIVE MG/DL
COLOR UR: YELLOW
GLUCOSE UR STRIP.AUTO-MCNC: NORMAL MG/DL
HOLD SPECIMEN: NORMAL
KETONES UR STRIP.AUTO-MCNC: NEGATIVE MG/DL
LEUKOCYTE ESTERASE UR QL STRIP.AUTO: ABNORMAL
MUCOUS THREADS #/AREA URNS AUTO: ABNORMAL /LPF
NITRITE UR QL STRIP.AUTO: NEGATIVE
P AXIS: 27 DEGREES
P OFFSET: 186 MS
P ONSET: 138 MS
PH UR STRIP.AUTO: 6 [PH]
PR INTERVAL: 146 MS
PROT UR STRIP.AUTO-MCNC: ABNORMAL MG/DL
Q ONSET: 211 MS
QRS COUNT: 18 BEATS
QRS DURATION: 86 MS
QT INTERVAL: 332 MS
QTC CALCULATION(BAZETT): 449 MS
QTC FREDERICIA: 406 MS
R AXIS: -25 DEGREES
RBC # UR STRIP.AUTO: ABNORMAL /UL
RBC #/AREA URNS AUTO: >20 /HPF
SP GR UR STRIP.AUTO: 1.01
T AXIS: 10 DEGREES
T OFFSET: 377 MS
UROBILINOGEN UR STRIP.AUTO-MCNC: NORMAL MG/DL
VENTRICULAR RATE: 110 BPM
WBC #/AREA URNS AUTO: >50 /HPF
WBC CLUMPS #/AREA URNS AUTO: ABNORMAL /HPF

## 2024-07-11 PROCEDURE — 2500000004 HC RX 250 GENERAL PHARMACY W/ HCPCS (ALT 636 FOR OP/ED): Performed by: INTERNAL MEDICINE

## 2024-07-11 PROCEDURE — 99232 SBSQ HOSP IP/OBS MODERATE 35: CPT | Performed by: UROLOGY

## 2024-07-11 PROCEDURE — 2500000004 HC RX 250 GENERAL PHARMACY W/ HCPCS (ALT 636 FOR OP/ED): Performed by: NURSE PRACTITIONER

## 2024-07-11 PROCEDURE — 2500000002 HC RX 250 W HCPCS SELF ADMINISTERED DRUGS (ALT 637 FOR MEDICARE OP, ALT 636 FOR OP/ED): Performed by: NURSE PRACTITIONER

## 2024-07-11 PROCEDURE — 2500000001 HC RX 250 WO HCPCS SELF ADMINISTERED DRUGS (ALT 637 FOR MEDICARE OP): Performed by: NURSE PRACTITIONER

## 2024-07-11 PROCEDURE — 99233 SBSQ HOSP IP/OBS HIGH 50: CPT | Performed by: INTERNAL MEDICINE

## 2024-07-11 PROCEDURE — 81003 URINALYSIS AUTO W/O SCOPE: CPT | Performed by: INTERNAL MEDICINE

## 2024-07-11 PROCEDURE — 1210000001 HC SEMI-PRIVATE ROOM DAILY

## 2024-07-11 PROCEDURE — 87086 URINE CULTURE/COLONY COUNT: CPT | Mod: ELYLAB | Performed by: INTERNAL MEDICINE

## 2024-07-11 RX ORDER — MEROPENEM 1 G/1
1 INJECTION, POWDER, FOR SOLUTION INTRAVENOUS EVERY 8 HOURS
Status: DISCONTINUED | OUTPATIENT
Start: 2024-07-11 | End: 2024-07-11

## 2024-07-11 RX ORDER — MEROPENEM 1 G/1
1 INJECTION, POWDER, FOR SOLUTION INTRAVENOUS EVERY 12 HOURS
Status: DISCONTINUED | OUTPATIENT
Start: 2024-07-11 | End: 2024-07-13 | Stop reason: HOSPADM

## 2024-07-11 RX ORDER — MEROPENEM 1 G/1
1 INJECTION, POWDER, FOR SOLUTION INTRAVENOUS EVERY 12 HOURS
Status: DISCONTINUED | OUTPATIENT
Start: 2024-07-11 | End: 2024-07-11

## 2024-07-11 ASSESSMENT — COGNITIVE AND FUNCTIONAL STATUS - GENERAL
DRESSING REGULAR LOWER BODY CLOTHING: A LOT
DAILY ACTIVITIY SCORE: 18
CLIMB 3 TO 5 STEPS WITH RAILING: TOTAL
MOBILITY SCORE: 12
MOVING TO AND FROM BED TO CHAIR: A LOT
PERSONAL GROOMING: A LITTLE
DRESSING REGULAR UPPER BODY CLOTHING: A LITTLE
WALKING IN HOSPITAL ROOM: TOTAL
TOILETING: A LITTLE
TURNING FROM BACK TO SIDE WHILE IN FLAT BAD: A LITTLE
MOVING FROM LYING ON BACK TO SITTING ON SIDE OF FLAT BED WITH BEDRAILS: A LITTLE
MOVING TO AND FROM BED TO CHAIR: A LOT
CLIMB 3 TO 5 STEPS WITH RAILING: TOTAL
STANDING UP FROM CHAIR USING ARMS: A LOT
MOVING FROM LYING ON BACK TO SITTING ON SIDE OF FLAT BED WITH BEDRAILS: A LITTLE
WALKING IN HOSPITAL ROOM: TOTAL
TURNING FROM BACK TO SIDE WHILE IN FLAT BAD: A LITTLE
STANDING UP FROM CHAIR USING ARMS: A LOT
MOBILITY SCORE: 12
HELP NEEDED FOR BATHING: A LITTLE

## 2024-07-11 ASSESSMENT — PAIN SCALES - GENERAL
PAINLEVEL_OUTOF10: 0 - NO PAIN

## 2024-07-11 ASSESSMENT — PAIN - FUNCTIONAL ASSESSMENT
PAIN_FUNCTIONAL_ASSESSMENT: 0-10

## 2024-07-11 NOTE — CARE PLAN
The patient's goals for the shift include pain at a tolerable level    The clinical goals for the shift include Patient will remain safe and HD stable during this shift    Problem: Pain - Adult  Goal: Verbalizes/displays adequate comfort level or baseline comfort level  7/11/2024 0816 by Carlitos Sosa RN  Outcome: Progressing  7/11/2024 0004 by Carlitos Sosa RN  Outcome: Progressing     Problem: Safety - Adult  Goal: Free from fall injury  7/11/2024 0816 by Carlitos Sosa RN  Outcome: Progressing  7/11/2024 0004 by Carlitos Sosa RN  Outcome: Progressing     Problem: Discharge Planning  Goal: Discharge to home or other facility with appropriate resources  7/11/2024 0816 by Carlitos Sosa RN  Outcome: Progressing  7/11/2024 0004 by Carlitos Sosa RN  Outcome: Progressing     Problem: Chronic Conditions and Co-morbidities  Goal: Patient's chronic conditions and co-morbidity symptoms are monitored and maintained or improved  7/11/2024 0816 by Carlitos Sosa RN  Outcome: Progressing  7/11/2024 0004 by Carlitos Sosa RN  Outcome: Progressing     Problem: Fall/Injury  Goal: Not fall by end of shift  7/11/2024 0816 by Carlitos Sosa RN  Outcome: Progressing  7/11/2024 0004 by Carlitos Sosa RN  Outcome: Progressing  Goal: Be free from injury by end of the shift  7/11/2024 0816 by Carlitos Sosa RN  Outcome: Progressing  7/11/2024 0004 by Carlitos Sosa RN  Outcome: Progressing  Goal: Verbalize understanding of personal risk factors for fall in the hospital  7/11/2024 0816 by Carlitos Sosa RN  Outcome: Progressing  7/11/2024 0004 by Carlitos Sosa RN  Outcome: Progressing  Goal: Verbalize understanding of risk factor reduction measures to prevent injury from fall in the home  7/11/2024 0816 by Carlitos Sosa RN  Outcome: Progressing  7/11/2024 0004 by Carlitos Sosa RN  Outcome: Progressing  Goal: Use assistive devices by end of the shift  7/11/2024 0816 by Carlitos Sosa RN  Outcome: Progressing  7/11/2024 0004 by Carlitos  YARIEL Sosa  Outcome: Progressing  Goal: Pace activities to prevent fatigue by end of the shift  7/11/2024 0816 by Carlitos Sosa RN  Outcome: Progressing  7/11/2024 0004 by Carlitos Sosa RN  Outcome: Progressing     Problem: Skin  Goal: Decreased wound size/increased tissue granulation at next dressing change  7/11/2024 0816 by Carlitos Sosa RN  Outcome: Progressing  7/11/2024 0004 by Carlitos Sosa RN  Outcome: Progressing  Goal: Participates in plan/prevention/treatment measures  7/11/2024 0816 by Carlitos Sosa RN  Outcome: Progressing  7/11/2024 0004 by Carlitos Sosa RN  Outcome: Progressing  Goal: Prevent/manage excess moisture  7/11/2024 0816 by Carlitos Sosa RN  Outcome: Progressing  7/11/2024 0004 by Carlitos Sosa RN  Outcome: Progressing  Goal: Prevent/minimize sheer/friction injuries  7/11/2024 0816 by Carlitos Sosa RN  Outcome: Progressing  7/11/2024 0004 by Carlitos Sosa RN  Outcome: Progressing  Goal: Promote/optimize nutrition  7/11/2024 0816 by Carlitos Sosa RN  Outcome: Progressing  7/11/2024 0004 by Carlitos Sosa RN  Outcome: Progressing  Goal: Promote skin healing  7/11/2024 0816 by Carlitos Sosa RN  Outcome: Progressing  7/11/2024 0004 by Carlitos Sosa RN  Outcome: Progressing

## 2024-07-11 NOTE — PROGRESS NOTES
07/11/24 0933   Discharge Planning   Home or Post Acute Services In home services   Type of Home Care Services Home nursing visits   Expected Discharge Disposition  Services  (active with [facility] . plan to resume)   Patient Choice   Patient / Family choosing to utilize agency / facility established prior to hospitalization Yes     Met with pt this am. Bell cath, glycine irrigation dc. Pt alert to person & place this am. Plan remains for dc home with [facility]. Requested PT/OT eval to work with pt. Care transitions team to cont to follow for dc needs & case progression. Urology cont to follow.

## 2024-07-11 NOTE — PROGRESS NOTES
Marco Antonio Merlos Jr. is a 79 y.o. male on day 2 of admission presenting with Gross hematuria.    Subjective   Postoperative day #2 status post cystoscopy and clot evacuation.  Urine is now clear.  Patient reports he has been laying supine for 4 days and is afraid about his mobility.       Objective     Last Recorded Vitals  Vitals:    07/11/24 1306   BP: 120/66   Pulse: 81   Resp: 18   Temp: 37.2 °C (99 °F)   SpO2: 96%       Intake/Output last 3 Shifts:  I/O last 3 completed shifts:  In: - (0 mL/kg)   Out: 5200 (73.4 mL/kg) [Urine:5200 (2 mL/kg/hr)]  Weight: 70.8 kg     Exam:  General: in NAD, appears stated age  Head: normocephalic, atraumatic  Respiratory: normal effort, no use of accessory muscles  Cardiovascular: no edema noted  Abdomen: Soft, nondistended, appropriately tender, no rebound or guarding   : Some blood around meatus  Urine: Clear  Neurologic: grossly intact, oriented to person/place/time  Psychiatric: mode and affect appropriate      Relevant Results    Lab Results   Component Value Date    WBC 15.4 (H) 07/10/2024    HGB 9.7 (L) 07/10/2024    HCT 28.4 (L) 07/10/2024     (H) 07/10/2024     07/10/2024     Lab Results   Component Value Date    GLUCOSE 111 (H) 07/10/2024    CALCIUM 8.5 (L) 07/10/2024     (L) 07/10/2024    K 4.3 07/10/2024    CO2 25 07/10/2024     07/10/2024    BUN 25 (H) 07/10/2024    CREATININE 1.02 07/10/2024              Assessment/Plan   Principal Problem:    Gross hematuria  Active Problems:    BPH with obstruction/lower urinary tract symptoms    Restless legs syndrome    Parkinson disease (Multi)    # Gross hematuria  -Resolved    # Urethral bleeding  -As expected with catheter and scope, dress as needed    # Urinary retention  -Follow-up as outpatient      Blayne Gupta MD

## 2024-07-11 NOTE — PROGRESS NOTES
Marco Antonio Merlos Jr. is a 79 y.o. male on day 2 of admission presenting with Gross hematuria.      Subjective   Pt seen and examined.        Objective     Last Recorded Vitals  /60   Pulse 74   Temp 36.2 °C (97.2 °F) (Temporal)   Resp 18   Wt 70.8 kg (156 lb 1.4 oz)   SpO2 93%   Intake/Output last 3 Shifts:    Intake/Output Summary (Last 24 hours) at 7/11/2024 1228  Last data filed at 7/11/2024 0940  Gross per 24 hour   Intake --   Output 1300 ml   Net -1300 ml       Admission Weight  Weight: 70.8 kg (156 lb 1.4 oz) (07/09/24 0315)    Daily Weight  07/09/24 : 70.8 kg (156 lb 1.4 oz)      Physical Exam  Constitutional:       Appearance: Normal appearance. He is not ill-appearing or diaphoretic.   HENT:      Head: Normocephalic and atraumatic.      Mouth/Throat:      Mouth: Mucous membranes are moist.   Eyes:      General: No scleral icterus.     Extraocular Movements: Extraocular movements intact.      Conjunctiva/sclera: Conjunctivae normal.      Pupils: Pupils are equal, round, and reactive to light.   Cardiovascular:      Rate and Rhythm: Normal rate and regular rhythm.      Heart sounds: Murmur heard.   Pulmonary:      Effort: No respiratory distress.      Breath sounds: No stridor. No wheezing, rhonchi or rales.   Chest:      Chest wall: No tenderness.   Abdominal:      General: There is no distension.      Palpations: There is no mass.      Tenderness: There is abdominal tenderness. There is no right CVA tenderness, left CVA tenderness, guarding or rebound.      Hernia: No hernia is present.    Bell in place draining slightly cloudy yellow urine.     General: Skin is warm and dry.   Neurological:      General: No focal deficit present.      Mental Status: He is alert and oriented to person, place, and time. Mental status is at baseline.      Comments: Patient does have resting tremor from Parkinson's        Relevant Results  No results found for this or any previous visit (from the past 24 hour(s)).      No orders to display       Scheduled medications  finasteride, 5 mg, oral, Daily  lidocaine, 1 Application, urethral, Once  magnesium oxide, 400 mg, oral, Daily  meropenem, 1 g, intravenous, q12h  pantoprazole, 40 mg, oral, Daily  tamsulosin, 0.4 mg, oral, Daily      Continuous medications     PRN medications  PRN medications: acetaminophen **OR** acetaminophen **OR** acetaminophen, melatonin, morphine, ondansetron **OR** ondansetron, oxyCODONE, oxyCODONE, oxyCODONE, polyethylene glycol, promethazine (Phenergan) 6.25 mg in sodium chloride 0.9% 50 mL IV         Assessment/Plan                  Principal Problem:    Gross hematuria  Active Problems:    BPH with obstruction/lower urinary tract symptoms    Restless legs syndrome    Parkinson disease (Multi)    # Gross hematuria  # BPH with obstruction/lower urinary tract symptoms  # Complicated UTI  # Restless legs syndrome  # Parkinson disease (Multi)    S/p cystoscopy  Patient seen and evaluated by urology who eventually did a cystoscopy after Bell had to be replaced due to significant clotting blocking flow of urine.  cystoscopy copy noted patient with massive BPH.  Patient had large clot burden within the bladder of well-formed clot roughly 2 [id number] to 300 cc of clot evacuated.   UA with reflex cx ordered  Cover with meropenem  PT/OT eval  monitor              Carla Barcenas MD

## 2024-07-12 ENCOUNTER — APPOINTMENT (OUTPATIENT)
Dept: RADIOLOGY | Facility: HOSPITAL | Age: 79
End: 2024-07-12
Payer: MEDICARE

## 2024-07-12 VITALS
OXYGEN SATURATION: 95 % | BODY MASS INDEX: 26.01 KG/M2 | WEIGHT: 156.09 LBS | RESPIRATION RATE: 16 BRPM | HEART RATE: 86 BPM | TEMPERATURE: 97.2 F | SYSTOLIC BLOOD PRESSURE: 115 MMHG | DIASTOLIC BLOOD PRESSURE: 67 MMHG | HEIGHT: 65 IN

## 2024-07-12 LAB
ANION GAP SERPL CALC-SCNC: 9 MMOL/L (ref 10–20)
BUN SERPL-MCNC: 16 MG/DL (ref 6–23)
CALCIUM SERPL-MCNC: 8.5 MG/DL (ref 8.6–10.3)
CHLORIDE SERPL-SCNC: 103 MMOL/L (ref 98–107)
CO2 SERPL-SCNC: 27 MMOL/L (ref 21–32)
CREAT SERPL-MCNC: 0.82 MG/DL (ref 0.5–1.3)
EGFRCR SERPLBLD CKD-EPI 2021: 88 ML/MIN/1.73M*2
ERYTHROCYTE [DISTWIDTH] IN BLOOD BY AUTOMATED COUNT: 14.6 % (ref 11.5–14.5)
GLUCOSE SERPL-MCNC: 104 MG/DL (ref 74–99)
HCT VFR BLD AUTO: 28.8 % (ref 41–52)
HGB BLD-MCNC: 9.9 G/DL (ref 13.5–17.5)
HOLD SPECIMEN: NORMAL
MCH RBC QN AUTO: 34.4 PG (ref 26–34)
MCHC RBC AUTO-ENTMCNC: 34.4 G/DL (ref 32–36)
MCV RBC AUTO: 100 FL (ref 80–100)
NRBC BLD-RTO: 0 /100 WBCS (ref 0–0)
PLATELET # BLD AUTO: 309 X10*3/UL (ref 150–450)
POTASSIUM SERPL-SCNC: 3.8 MMOL/L (ref 3.5–5.3)
RBC # BLD AUTO: 2.88 X10*6/UL (ref 4.5–5.9)
SODIUM SERPL-SCNC: 135 MMOL/L (ref 136–145)
WBC # BLD AUTO: 11.8 X10*3/UL (ref 4.4–11.3)

## 2024-07-12 PROCEDURE — 36415 COLL VENOUS BLD VENIPUNCTURE: CPT | Performed by: INTERNAL MEDICINE

## 2024-07-12 PROCEDURE — 80048 BASIC METABOLIC PNL TOTAL CA: CPT | Performed by: INTERNAL MEDICINE

## 2024-07-12 PROCEDURE — 2500000001 HC RX 250 WO HCPCS SELF ADMINISTERED DRUGS (ALT 637 FOR MEDICARE OP): Performed by: NURSE PRACTITIONER

## 2024-07-12 PROCEDURE — C1751 CATH, INF, PER/CENT/MIDLINE: HCPCS

## 2024-07-12 PROCEDURE — 97161 PT EVAL LOW COMPLEX 20 MIN: CPT | Mod: GP | Performed by: PHYSICAL THERAPIST

## 2024-07-12 PROCEDURE — 99239 HOSP IP/OBS DSCHRG MGMT >30: CPT | Performed by: INTERNAL MEDICINE

## 2024-07-12 PROCEDURE — 2500000002 HC RX 250 W HCPCS SELF ADMINISTERED DRUGS (ALT 637 FOR MEDICARE OP, ALT 636 FOR OP/ED): Performed by: NURSE PRACTITIONER

## 2024-07-12 PROCEDURE — 97165 OT EVAL LOW COMPLEX 30 MIN: CPT | Mod: GO

## 2024-07-12 PROCEDURE — 36410 VNPNXR 3YR/> PHY/QHP DX/THER: CPT

## 2024-07-12 PROCEDURE — 2780000003 HC OR 278 NO HCPCS

## 2024-07-12 PROCEDURE — 1210000001 HC SEMI-PRIVATE ROOM DAILY

## 2024-07-12 PROCEDURE — 2500000004 HC RX 250 GENERAL PHARMACY W/ HCPCS (ALT 636 FOR OP/ED): Performed by: INTERNAL MEDICINE

## 2024-07-12 PROCEDURE — 85027 COMPLETE CBC AUTOMATED: CPT | Performed by: INTERNAL MEDICINE

## 2024-07-12 RX ORDER — MEROPENEM AND SODIUM CHLORIDE 1 G/50ML
1 INJECTION, SOLUTION INTRAVENOUS EVERY 8 HOURS
Start: 2024-07-12 | End: 2024-07-22

## 2024-07-12 RX ORDER — LIDOCAINE HYDROCHLORIDE 10 MG/ML
5 INJECTION INFILTRATION; PERINEURAL ONCE
Status: DISCONTINUED | OUTPATIENT
Start: 2024-07-12 | End: 2024-07-13 | Stop reason: HOSPADM

## 2024-07-12 ASSESSMENT — COGNITIVE AND FUNCTIONAL STATUS - GENERAL
CLIMB 3 TO 5 STEPS WITH RAILING: TOTAL
DRESSING REGULAR LOWER BODY CLOTHING: A LOT
MOVING TO AND FROM BED TO CHAIR: A LOT
STANDING UP FROM CHAIR USING ARMS: A LOT
STANDING UP FROM CHAIR USING ARMS: A LOT
WALKING IN HOSPITAL ROOM: A LOT
PERSONAL GROOMING: A LITTLE
STANDING UP FROM CHAIR USING ARMS: A LOT
MOBILITY SCORE: 14
CLIMB 3 TO 5 STEPS WITH RAILING: A LOT
MOBILITY SCORE: 11
MOVING FROM LYING ON BACK TO SITTING ON SIDE OF FLAT BED WITH BEDRAILS: A LITTLE
MOVING TO AND FROM BED TO CHAIR: A LOT
DAILY ACTIVITIY SCORE: 16
DRESSING REGULAR UPPER BODY CLOTHING: A LITTLE
TURNING FROM BACK TO SIDE WHILE IN FLAT BAD: A LOT
PERSONAL GROOMING: A LITTLE
HELP NEEDED FOR BATHING: A LITTLE
TOILETING: A LITTLE
DRESSING REGULAR UPPER BODY CLOTHING: A LITTLE
TOILETING: A LOT
TURNING FROM BACK TO SIDE WHILE IN FLAT BAD: A LOT
MOVING TO AND FROM BED TO CHAIR: A LOT
TURNING FROM BACK TO SIDE WHILE IN FLAT BAD: A LITTLE
CLIMB 3 TO 5 STEPS WITH RAILING: TOTAL
MOVING FROM LYING ON BACK TO SITTING ON SIDE OF FLAT BED WITH BEDRAILS: A LOT
DRESSING REGULAR LOWER BODY CLOTHING: A LOT
MOVING FROM LYING ON BACK TO SITTING ON SIDE OF FLAT BED WITH BEDRAILS: A LOT
MOBILITY SCORE: 11
WALKING IN HOSPITAL ROOM: A LOT
WALKING IN HOSPITAL ROOM: A LOT
HELP NEEDED FOR BATHING: A LOT
DAILY ACTIVITIY SCORE: 18

## 2024-07-12 ASSESSMENT — PAIN SCALES - GENERAL
PAINLEVEL_OUTOF10: 0 - NO PAIN

## 2024-07-12 ASSESSMENT — PAIN - FUNCTIONAL ASSESSMENT
PAIN_FUNCTIONAL_ASSESSMENT: 0-10

## 2024-07-12 ASSESSMENT — ACTIVITIES OF DAILY LIVING (ADL): BATHING_ASSISTANCE: MODERATE

## 2024-07-12 NOTE — PROGRESS NOTES
Physical Therapy    Physical Therapy Evaluation    Patient Name: Marco Antonio Merlos Jr.  MRN: [id number]  Today's Date: 7/12/2024   Time Calculation  Start Time: 0946  Stop Time: 1002  Time Calculation (min): 16 min  1104/1104-B    Assessment/Plan   PT Assessment  PT Assessment Results: Decreased strength, Decreased endurance, Impaired balance, Decreased mobility, Decreased coordination  Rehab Prognosis: Good  Evaluation/Treatment Tolerance: Patient limited by fatigue  Medical Staff Made Aware: Yes  Strengths: Support of Caregivers, Insight into problems, Living arrangement secure, Housing layout, Access to adaptive/assistive products  Barriers to Participation: Comorbidities  End of Session Communication: Bedside nurse  Assessment Comment: Pt. is weak and deconditioned. Pt. is very motivated to improve and return to his prior level of function. Recomend continued therapy at a high to moderate intensity level.  End of Session Patient Position: Bed, 3 rail up, Alarm on (Call light within reach)  IP OR SWING BED PT PLAN  Inpatient or Swing Bed: Inpatient  PT Plan  Treatment/Interventions: Bed mobility, Transfer training, Gait training, Balance training, Strengthening, Endurance training, Therapeutic exercise  PT Plan: Ongoing PT  PT Frequency: 3 times per week  PT Discharge Recommendations: High intensity level of continued care, Moderate intensity level of continued care  PT Recommended Transfer Status: Assist x1, Assistive device  Physical Therapy eval completed per MD requisition. P.T. recommendations as outlined above. Recommend D/C from acute care when medically appropriate as deemed by medical staff.    Subjective           General Visit Information:  General  Reason for Referral: impaired mobility  Referred By: Dr. Barcenas OT/PT 7/9  Past Medical History Relevant to Rehab: includes: Arthritis, RLS, Parkinson's, [place], balance disorder, benign prostatic hypertrophy  Family/Caregiver Present: No  Prior to Session  Communication: Bedside nurse  Patient Position Received: Bed, 3 rail up, Alarm on  Preferred Learning Style: auditory, verbal  General Comment: Pt. is an 78yo who presented to [facility] with hematuria preventing urination. Pt. underwent cystoscopy with evacuation of clots on 7/9/2024 by Dr. Gupta.   Na (7/12) 135  Hgb (7/12) 9.9    Home Living:  Home Living  Home Living Comments: Pt. states that he lives alone in one story house, 3 TREVIN with HR. Has walk in shower, seat, and grab bar. Laundry in the basement.    Prior Level of Function:  Prior Function Per Pt/Caregiver Report  Prior Function Comments: Pt. amb with FWW PTA. Pt. was I  with ADLs. Pt. has meals on wheels, DIL takes laundry, independent with cleaning. Pt. reports 2 falls in last 3 months. Pt. does not drive. Pt. stated he was receivig [facility] PT at home PTA.    Precautions:  Precautions  Medical Precautions:  (Activity orders: No restrictions)  Precautions Comment: Per EMR: High fall risk         Objective     Pain:  Pain Assessment  Pain Assessment: 0-10  0-10 (Numeric) Pain Score: 0 - No pain    Cognition:  Cognition  Overall Cognitive Status: Within Functional Limits  Orientation Level: Oriented X4    General Assessments:  General Observation  General Observation: Bell, IV                     Dynamic Sitting Balance  Dynamic Sitting-Comments: Good static and Fair+ dynamic sitting balance  Dynamic Standing Balance  Dynamic Standing-Comments: Fair- static and Poor+ dynamic standing balance    Functional Assessments:     Bed Mobility  Bed Mobility: Yes  Bed Mobility 1  Bed Mobility 1: Supine to sitting  Level of Assistance 1: Moderate assistance  Bed Mobility Comments 1: A to maneuver LEs over EOB and to elevate trunk from bed  Bed Mobility 2  Bed Mobility  2: Sitting to supine  Level of Assistance 2: Moderate assistance  Bed Mobility Comments 2: A to lift LEs onto bed and to control descent/placement of trunk  Transfers  Transfer:  Yes  Transfer 1  Technique 1: Sit to stand  Transfer Device 1:  (FWW)  Transfer Level of Assistance 1: Moderate assistance  Trials/Comments 1: A for lifting, transferring weight over feet, steadying. VC's for hand placement  Transfers 2  Technique 2: Stand to sit  Transfer Device 2:  (FWW)  Transfer Level of Assistance 2: Minimum assistance  Trials/Comments 2: A to control descent. VC's for hand placement.  Ambulation/Gait Training  Ambulation/Gait Training Performed: Yes  Ambulation/Gait Training 1  Surface 1: Level tile  Device 1: Rolling walker  Assistance 1: Moderate assistance  Quality of Gait 1: Narrow base of support (slow, step-to gait with very short, occasionally shuffling steps, very narrow KENZIE)  Comments/Distance (ft) 1: 15'; A for balance, weight shift, maneuvering FWW, safety  Stairs  Stairs: No       Extremity/Trunk Assessments:        RLE   RLE :  (AROM WFL, strength 4-/5)  LLE   LLE :  (AROM WFL, strength 4-/5)    Outcome Measures:     [facility] Basic Mobility  Turning from your back to your side while in a flat bed without using bedrails: A lot  Moving from lying on your back to sitting on the side of a flat bed without using bedrails: A lot  Moving to and from bed to chair (including a wheelchair): A lot  Standing up from a chair using your arms (e.g. wheelchair or bedside chair): A lot  To walk in hospital room: A lot  Climbing 3-5 steps with railing: Total  Basic Mobility - Total Score: 11                                        Goals:  Encounter Problems       Encounter Problems (Active)       PT Problem       Pt. will transfer supine/sit with MIN A X 1 (Progressing)       Start:  07/12/24    Expected End:  07/26/24            Pt. will transfer sit/stand with FWW with MIN A X 1 (Progressing)       Start:  07/12/24    Expected End:  07/26/24            Pt.will ambulate 40' with FWW with MIN A  x 1 (Progressing)       Start:  07/12/24    Expected End:  07/26/24            Pt. will amb up/down 3 steps  with HR and MOD A x 1 (Not Progressing)       Start:  07/12/24    Expected End:  07/26/24            Pt. will perform 2 x 15 B LE AROM exercises  (Not Progressing)       Start:  07/12/24    Expected End:  07/26/24                   Education Documentation  Mobility Training, taught by Lavell Guzman, PT at 7/12/2024  1:00 PM.  Learner: Patient  Readiness: Acceptance  Method: Explanation  Response: Verbalizes Understanding, Needs Reinforcement  Comment: Role PT, transfers, amb, safety, PT POC

## 2024-07-12 NOTE — CARE PLAN
The patient's goals for the shift include pain at a tolerable level.    The clinical goals for the shift include Patient will remain safe and HD stable during this shift.    Over the shift, the patient did make progress toward the following goals.

## 2024-07-12 NOTE — PROGRESS NOTES
Occupational Therapy    Evaluation    Patient Name: Marco Antonio Merlos Jr.  MRN: [id number]  Today's Date: 7/12/2024  Time Calculation  Start Time: 0945  Stop Time: 0958  Time Calculation (min): 13 min        Assessment:  OT Assessment: decreased endurance, balance, and safety with ADLs and mobility. Mod A for transfers and functional mobility.  Prognosis: Good  Evaluation/Treatment Tolerance: Patient tolerated treatment well  End of Session Communication: Bedside nurse  End of Session Patient Position: Bed, 3 rail up, Alarm on  OT Assessment Results: Decreased ADL status, Decreased endurance, Decreased functional mobility  Prognosis: Good  Evaluation/Treatment Tolerance: Patient tolerated treatment well  Plan:  Treatment Interventions: ADL retraining, Functional transfer training, Endurance training  OT Frequency: 3 times per week  OT Discharge Recommendations: Moderate intensity level of continued care, High intensity level of continued care  OT - OK to Discharge: Yes (when medically stable/cleared)    Subjective   Current Problem:  1. Gross hematuria  Case Request Operating Room: Cystoscopy with Evacuation Clots, fulguration    Case Request Operating Room: Cystoscopy with Evacuation Clots, fulguration        General:  General  Reason for Referral: ADL impairment  Referred By: Swapna OT/PT 7/9  Past Medical History Relevant to Rehab: Arthritis, RLS, Parkinson's, [place], balance disorder, benign prostatic hypertrophy  Prior to Session Communication: Bedside nurse  Patient Position Received: Bed, 3 rail up, Alarm on  General Comment: Pt. is 80 y/o male transferred to [facility] with hematuria preventing urination. 7/9 pt. underwent cystoscopy with evacuation of clots with Dr. Gupta. Na 135,  Hgb 9.9  Precautions:  Medical Precautions: Fall precautions  Precautions Comment: IV; telemetry; holley    Pain:  Pain Assessment  Pain Assessment: 0-10  0-10 (Numeric) Pain Score: 0 - No pain    Objective    Cognition:  Overall Cognitive Status: Within Functional Limits  Orientation Level: Oriented X4     Home Living:  Home Living Comments: Pt. states that he lives alone in one story house, 3 TREVIN with HR. Has walk in shower, seat, and grab bar. Laundry in the basement.  Prior Function:  Prior Function Comments: Pt. uses WW for all ambulation. Independent with ADLs. Pt. has meals on wheels, DIL takes laundry, independent with cleaning. 2 falls. Does not drive.    ADL:  Eating Assistance: Independent  Grooming Assistance: Minimal  Bathing Assistance: Moderate  UE Dressing Assistance: Minimal  LE Dressing Assistance: Moderate  Toileting Assistance with Device: Moderate  Activity Tolerance:  Endurance: Decreased tolerance for upright activites  Activity Tolerance Comments: Pt. states that he feels he has lost a lot of strength because of laying in hospital bed  Bed Mobility/Transfers: Bed Mobility  Bed Mobility: Yes  Bed Mobility 1  Bed Mobility 1: Supine to sitting  Level of Assistance 1: Moderate assistance  Bed Mobility Comments 1: Assist to bring LEs to edge of bed and trunk to upright sitting  Bed Mobility 2  Bed Mobility  2: Sitting to supine  Level of Assistance 2: Moderate assistance  Bed Mobility Comments 2: assist to bring LEs back into bed    Transfers  Transfer: Yes  Transfer 1  Technique 1: Sit to stand  Transfer Device 1: Walker  Transfer Level of Assistance 1: Moderate assistance  Trials/Comments 1: VCs for safe hand placement. Assist to lift, steady, weight shift forward over walker.      Functional Mobility:  Functional Mobility  Functional Mobility Performed:  (Mod A x1 with WW for ~25 feet in room. Assist for balance, steadying, and safety. Pt. with narrow base of support.)  Sitting Balance:  Dynamic Sitting Balance  Dynamic Sitting-Comments: poor +  Standing Balance:  Dynamic Standing Balance  Dynamic Standing-Comments: Poor     Strength:  Strength Comments: Juan WFL MMT    Hand Function:  Gross Grasp:  Functional  Extremities: RUE   RUE : Within Functional Limits and LUE   LUE: Within Functional Limits      Outcome Measures:[facility] Daily Activity  Putting on and taking off regular lower body clothing: A lot  Bathing (including washing, rinsing, drying): A lot  Putting on and taking off regular upper body clothing: A little  Toileting, which includes using toilet, bedpan or urinal: A lot  Taking care of personal grooming such as brushing teeth: A little  Eating Meals: None  Daily Activity - Total Score: 16      Education Documentation  ADL Training, taught by Abril Melton OT at 7/12/2024 12:02 PM.  Learner: Patient  Readiness: Acceptance  Method: Explanation  Response: Verbalizes Understanding    IP EDUCATION:  Education  Individual(s) Educated: Patient  Education Provided: Fall precautons, Risk and benefits of OT discussed with patient or other, POC discussed and agreed upon    Goals:  Encounter Problems       Encounter Problems (Active)       OT Goals       SBA for all functional transfers  (Progressing)       Start:  07/12/24    Expected End:  07/26/24            Fair + dyn standing balance during ADLs and transfers  (Progressing)       Start:  07/12/24    Expected End:  07/26/24            SBA for LB dressing  (Progressing)       Start:  07/12/24    Expected End:  07/26/24            SBA for toileting tasks and clothing mgmt  (Progressing)       Start:  07/12/24    Expected End:  07/26/24

## 2024-07-12 NOTE — PROGRESS NOTES
Marco Atnonio Merlos Jr. is a 79 y.o. male on day 3 of admission presenting with Gross hematuria.      Subjective   Pt seen and examined.        Objective     Last Recorded Vitals  /74 (BP Location: Right arm, Patient Position: Lying)   Pulse 82   Temp 36.7 °C (98.1 °F) (Temporal)   Resp 16   Wt 70.8 kg (156 lb 1.4 oz)   SpO2 96%   Intake/Output last 3 Shifts:    Intake/Output Summary (Last 24 hours) at 7/12/2024 1030  Last data filed at 7/12/2024 0600  Gross per 24 hour   Intake 400 ml   Output 2475 ml   Net -2075 ml       Admission Weight  Weight: 70.8 kg (156 lb 1.4 oz) (07/09/24 0315)    Daily Weight  07/09/24 : 70.8 kg (156 lb 1.4 oz)      Physical Exam  Constitutional:       Appearance: Normal appearance. He is not ill-appearing or diaphoretic.   HENT:      Head: Normocephalic and atraumatic.      Mouth/Throat:      Mouth: Mucous membranes are moist.   Eyes:      General: No scleral icterus.     Extraocular Movements: Extraocular movements intact.      Conjunctiva/sclera: Conjunctivae normal.      Pupils: Pupils are equal, round, and reactive to light.   Cardiovascular:      Rate and Rhythm: Normal rate and regular rhythm.      Heart sounds: Murmur heard.   Pulmonary:      Effort: No respiratory distress.      Breath sounds: No stridor. No wheezing, rhonchi or rales.   Chest:      Chest wall: No tenderness.   Abdominal:      General: There is no distension.      Palpations: There is no mass.      Tenderness: There is abdominal tenderness. There is no right CVA tenderness, left CVA tenderness, guarding or rebound.      Hernia: No hernia is present.    Bell in place draining slightly cloudy yellow urine.     General: Skin is warm and dry.   Neurological:      General: No focal deficit present.      Mental Status: He is alert and oriented to person, place, and time. Mental status is at baseline.      Comments: Patient does have resting tremor from Parkinson's        Relevant Results  Results for orders  placed or performed during the hospital encounter of 07/09/24 (from the past 24 hour(s))   Urinalysis with Reflex Culture and Microscopic   Result Value Ref Range    Color, Urine Yellow Light-Yellow, Yellow, Dark-Yellow    Appearance, Urine Turbid (N) Clear    Specific Gravity, Urine 1.011 1.005 - 1.035    pH, Urine 6.0 5.0, 5.5, 6.0, 6.5, 7.0, 7.5, 8.0    Protein, Urine 50 (1+) (A) NEGATIVE, 10 (TRACE), 20 (TRACE) mg/dL    Glucose, Urine Normal Normal mg/dL    Blood, Urine OVER (3+) (A) NEGATIVE    Ketones, Urine NEGATIVE NEGATIVE mg/dL    Bilirubin, Urine NEGATIVE NEGATIVE    Urobilinogen, Urine Normal Normal mg/dL    Nitrite, Urine NEGATIVE NEGATIVE    Leukocyte Esterase, Urine 500 Nat/µL (A) NEGATIVE   Extra Urine Gray Tube   Result Value Ref Range    Extra Tube Hold for add-ons.    Microscopic Only, Urine   Result Value Ref Range    WBC, Urine >50 (A) 1-5, NONE /HPF    WBC Clumps, Urine FEW Reference range not established. /HPF    RBC, Urine >20 (A) NONE, 1-2, 3-5 /HPF    Bacteria, Urine 1+ (A) NONE SEEN /HPF    Mucus, Urine FEW Reference range not established. /LPF   CBC   Result Value Ref Range    WBC 11.8 (H) 4.4 - 11.3 x10*3/uL    nRBC 0.0 0.0 - 0.0 /100 WBCs    RBC 2.88 (L) 4.50 - 5.90 x10*6/uL    Hemoglobin 9.9 (L) 13.5 - 17.5 g/dL    Hematocrit 28.8 (L) 41.0 - 52.0 %     80 - 100 fL    MCH 34.4 (H) 26.0 - 34.0 pg    MCHC 34.4 32.0 - 36.0 g/dL    RDW 14.6 (H) 11.5 - 14.5 %    Platelets 309 150 - 450 x10*3/uL   Basic Metabolic Panel   Result Value Ref Range    Glucose 104 (H) 74 - 99 mg/dL    Sodium 135 (L) 136 - 145 mmol/L    Potassium 3.8 3.5 - 5.3 mmol/L    Chloride 103 98 - 107 mmol/L    Bicarbonate 27 21 - 32 mmol/L    Anion Gap 9 (L) 10 - 20 mmol/L    Urea Nitrogen 16 6 - 23 mg/dL    Creatinine 0.82 0.50 - 1.30 mg/dL    eGFR 88 >60 mL/min/1.73m*2    Calcium 8.5 (L) 8.6 - 10.3 mg/dL   SST TOP   Result Value Ref Range    Extra Tube Hold for add-ons.         No orders to display       Scheduled  medications  finasteride, 5 mg, oral, Daily  lidocaine, 1 Application, urethral, Once  magnesium oxide, 400 mg, oral, Daily  meropenem, 1 g, intravenous, q12h  pantoprazole, 40 mg, oral, Daily  tamsulosin, 0.4 mg, oral, Daily      Continuous medications     PRN medications  PRN medications: acetaminophen **OR** acetaminophen **OR** acetaminophen, melatonin, morphine, ondansetron **OR** ondansetron, oxyCODONE, oxyCODONE, oxyCODONE, polyethylene glycol, promethazine (Phenergan) 6.25 mg in sodium chloride 0.9% 50 mL IV         Assessment/Plan                  Principal Problem:    Gross hematuria  Active Problems:    BPH with obstruction/lower urinary tract symptoms    Restless legs syndrome    Parkinson disease (Multi)    # Gross hematuria  # BPH with obstruction/lower urinary tract symptoms  # Complicated UTI, last urine cx multidrug resistant  # Restless legs syndrome  # Parkinson disease     S/p cystoscopy  Patient seen and evaluated by urology who eventually did a cystoscopy after Bell had to be replaced due to significant clotting blocking flow of urine.  cystoscopy copy noted patient with massive BPH.  Patient had large clot burden within the bladder of well-formed clot roughly 2 [id number] to 300 cc of clot evacuated.   Urine cx pending  Cover with meropenem  ID consult  PT/OT eval  monitor              Carla Barcenas MD

## 2024-07-12 NOTE — PROGRESS NOTES
07/12/24 1241   Discharge Planning   Assistance Needed IV ABX, holley cath care, therapy   Who is requesting discharge planning? Provider   Home or Post Acute Services Post acute facilities (Rehab/SNF/etc)   Type of Post Acute Facility Services Rehab   Expected Discharge Disposition IRF   Does the patient need discharge transport arranged? Yes   RoundTrip coordination needed? Yes     Notified by ID phys that pt will need IV ABX on discharge. PT notes pending. [facility] OT 16. Currently IV meropenem for complicated UTI. Spoke with pt & his DIL in room. Requesting referral to [facility] Acute rehab. Pt states he was at a facility in the past & did well. Notified Ivanna call. Referral to be sent when ID note & PT note available.   415pm Pt accepted at [facility] acute rehab. Orders for discharge today.  set up for 7pm. Pt & pt jane stefanie notified , in agreement with plan & dc.

## 2024-07-12 NOTE — DISCHARGE SUMMARY
Discharge Diagnosis  Gross hematuria    Issues Requiring Follow-Up  PCP and urology follow up in 1-2 weeks    Discharge Meds     Your medication list        START taking these medications        Instructions Last Dose Given Next Dose Due   meropenem 1 gram/50 mL piggyback IV  Commonly known as: Merrem      Infuse 50 mL (1 g) at 100 mL/hr over 0.5 hours into a venous catheter every 8 hours for 10 days. CBC BMP in 1 week  Fax results to [id number]  Follow-up with Dr Valentina JACINTO in 10 days at [phone]              CONTINUE taking these medications        Instructions Last Dose Given Next Dose Due   DULoxetine 30 mg DR capsule  Commonly known as: Cymbalta  Start taking on: December 14, 2023      Take 1 capsule (30 mg) by mouth once daily for 7 days, THEN 2 capsules (60 mg) once daily. Do not crush or chew..       finasteride 5 mg tablet  Commonly known as: Proscar      Take 1 tablet (5 mg) by mouth once daily.       magnesium oxide 400 mg (241.3 mg magnesium) tablet  Commonly known as: Mag-Ox      Take 1 tablet (400 mg) by mouth once daily.       melatonin 3 mg tablet           tamsulosin 0.4 mg 24 hr capsule  Commonly known as: Flomax      Take 1 capsule (0.4 mg) by mouth once daily.                 Where to Get Your Medications        Information about where to get these medications is not yet available    Ask your nurse or doctor about these medications  meropenem 1 gram/50 mL piggyback IV         Test Results Pending At Discharge  Pending Labs       Order Current Status    Urine Culture Preliminary result            Hospital Course     # Gross hematuria  # BPH with obstruction/lower urinary tract symptoms  # Complicated UTI, last urine cx multidrug resistant  # Restless legs syndrome  # Parkinson disease      S/p cystoscopy  Patient seen and evaluated by urology who eventually did a cystoscopy after Bell had to be replaced due to significant clotting blocking flow of urine.  cystoscopy copy noted patient with  massive BPH.  Patient had large clot burden within the bladder of well-formed clot roughly 2 [id number] to 300 cc of clot evacuated.   Urine cx pending  Cover with meropenem  ID consult: Arrange for total of 10 days of IV meropenem on discharge   Midline placement    Patient will be discharged to rehab today for midline placement.  Patient will complete the antibiotic course of IV meropenem there.  Advised outpatient follow-up with PCP and urology as outpatient.      Discharge time spent more than 30 minutes.    Pertinent Physical Exam At Time of Discharge  Physical Exam    Constitutional: No acute distress, awake, alert  Head/Neck: Neck supple  Respiratory/Thorax: Lungs are Clear to auscultation  Cardiovascular: Regular, rate and rhythm,  2+ equal pulses of the extremities, normal S 1and S 2  Gastrointestinal: Nondistended, soft, non-tender, no rebound tenderness or guarding  Extremities: No edema. No calf tenderness.  Neurological: Awake and alert. No focal neurological deficits  Psychological: Appropriate mood and behavior    Outpatient Follow-Up  Future Appointments   Date Time Provider Department Center   8/7/2024  8:30 AM MD CHUCK Hickey   9/20/2024  2:20 PM MD CHUCK Hickey MD

## 2024-07-12 NOTE — CARE PLAN
The patient's goals for the shift include pain at a tolerable level    The clinical goals for the shift include patient will have no signs of hematuria through out shift    Over the shift, the patient did not make progress toward the following goals. Barriers to progression include ***. Recommendations to address these barriers include ***.

## 2024-07-12 NOTE — CONSULTS
Consults  Referred by Dr Swapna Beauchamp MD: Santhosh Loya DO    Reason For Consult  Multidrug-resistant UTI    History Of Present Illness  Marco Antonio Merlos Jr. is a 79 y.o. male with past medical history of Parkinson's disease, benign prostatic hypertrophy, hearing loss, history of urinary retention and bilateral hydronephrosis with acute kidney injury, degenerative joint disease, cervical canal stenosis with history of right total knee replacement and left hip fracture, rib fractures, admitted on July 9 thru [facility] with gross hematuria.  Patient had a Bell catheter inserted at the end of May when he had acute urinary retention.  Patient was treated for UTI with oral Bactrim.  Had his Bell catheter replaced 1 week prior to presentation with hematuria.  Patient has been progressively getting weaker.  He reports good appetite.  No fevers or chills.  Had leukocytosis with a white blood cell count of 20K on admission.  Had positive pyuria with white blood cell clumps.  Urine culture done on admission with greater than 100K gram-negative rods  Urine culture done on July 3 with multidrug-resistant Klebsiella oxytoca, ESBL.   Dr. Barcenas discussed the case with me yesterday and upon my recommendation patient was started on IV meropenem, well-tolerated.  Urine with persistent large sediment.   Daughter-in-law is at bedside  Patient is hard of hearing     Past Medical History  He has a past medical history of Personal history of other diseases of the musculoskeletal system and connective tissue (03/17/2022) and Personal history of other specified conditions (03/17/2022).    Surgical History  He has a past surgical history that includes Other surgical history (03/17/2022).     Social History     Occupational History    Not on file   Tobacco Use    Smoking status: Never    Smokeless tobacco: Never   Vaping Use    Vaping status: Never Used   Substance and Sexual Activity    Alcohol use: Never    Drug use:  Never    Sexual activity: Defer     Travel History   Travel since 06/12/24    No documented travel since 06/12/24          Family History  No family history on file.  Allergies  Patient has no known allergies.     Immunization History   Administered Date(s) Administered    DTaP IPV combined vaccine (KINRIX, QUADRACEL) 09/29/2021    Pneumococcal conjugate vaccine, 13-valent (PREVNAR 13) 05/16/2017    Tdap vaccine, age 7 year and older (BOOSTRIX, ADACEL) 09/29/2021     Medications  Home medications:  Medications Prior to Admission   Medication Sig Dispense Refill Last Dose    finasteride (Proscar) 5 mg tablet Take 1 tablet (5 mg) by mouth once daily. 90 tablet 3 Past Week    magnesium oxide (Mag-Ox) 400 mg (241.3 mg magnesium) tablet Take 1 tablet (400 mg) by mouth once daily. 90 tablet 2 Past Week    melatonin 3 mg tablet Take 1 tablet (3 mg) by mouth once daily at bedtime.   Past Week    tamsulosin (Flomax) 0.4 mg 24 hr capsule Take 1 capsule (0.4 mg) by mouth once daily. 90 capsule 3 Past Week    DULoxetine (Cymbalta) 30 mg DR capsule Take 1 capsule (30 mg) by mouth once daily for 7 days, THEN 2 capsules (60 mg) once daily. Do not crush or chew.. 30 capsule 2      Current medications:  Scheduled medications  finasteride, 5 mg, oral, Daily  lidocaine, 1 Application, urethral, Once  magnesium oxide, 400 mg, oral, Daily  meropenem, 1 g, intravenous, q12h  pantoprazole, 40 mg, oral, Daily  tamsulosin, 0.4 mg, oral, Daily      Continuous medications     PRN medications  PRN medications: acetaminophen **OR** acetaminophen **OR** acetaminophen, melatonin, morphine, ondansetron **OR** ondansetron, oxyCODONE, oxyCODONE, oxyCODONE, polyethylene glycol, promethazine (Phenergan) 6.25 mg in sodium chloride 0.9% 50 mL IV    Review of Systems  14 system review is negative otherwise  Objective  Range of Vitals (last 24 hours)  Heart Rate:  [80-83]   Temp:  [36.5 °C (97.7 °F)-37.2 °C (99 °F)]   Resp:  [15-18]   BP:  (111-123)/(66-74)   SpO2:  [94 %-96 %]   Daily Weight  07/09/24 : 70.8 kg (156 lb 1.4 oz)    Body mass index is 26.01 kg/m².     Physical Exam  Vitals:    07/11/24 1306 07/11/24 1937 07/12/24 0504 07/12/24 0829   BP: 120/66 111/67 123/71 123/74   BP Location: Right arm Right arm Right arm Right arm   Patient Position: Lying Lying Lying Lying   Pulse: 81 83 80 82   Resp: 18 18 15 16   Temp: 37.2 °C (99 °F) 36.7 °C (98.1 °F) 36.5 °C (97.7 °F) 36.7 °C (98.1 °F)   TempSrc: Temporal Temporal Temporal Temporal   SpO2: 96% 95% 94% 96%   Weight:       Height:         General Appearance: alert and oriented to person, place and time, well-developed and well-nourished, in no acute distress, on RA  Skin: warm and dry, no rash.   Head: normocephalic and atraumatic  Eyes: anicteric sclerae  ENT:  normal mucous membranes. No oral thrush  Lungs: normal respiratory effort, clear lungs  Heart normal S1-S2, no murmur  Abdomen: soft, no tenderness  No leg edema  No erythema, no tenderness  No focal neurological deficit  Bell catheter with large sediment.  No gross hematuria noted   Labs  Results from last 72 hours   Lab Units 07/12/24  0539 07/10/24  0809   WBC AUTO x10*3/uL 11.8* 15.4*   HEMOGLOBIN g/dL 9.9* 9.7*   HEMATOCRIT % 28.8* 28.4*   PLATELETS AUTO x10*3/uL 309 312     Results from last 72 hours   Lab Units 07/12/24  0539 07/10/24  0809   SODIUM mmol/L 135* 134*   POTASSIUM mmol/L 3.8 4.3   CHLORIDE mmol/L 103 104   CO2 mmol/L 27 25   BUN mg/dL 16 25*   CREATININE mg/dL 0.82 1.02   GLUCOSE mg/dL 104* 111*   CALCIUM mg/dL 8.5* 8.5*   ANION GAP mmol/L 9* 9*   EGFR mL/min/1.73m*2 88 74     Results from last 72 hours   Lab Units 07/10/24  0809   ALK PHOS U/L 74   BILIRUBIN TOTAL mg/dL 0.7   PROTEIN TOTAL g/dL 5.6*   ALT U/L 7*   AST U/L 12   ALBUMIN g/dL 3.2*     Estimated Creatinine Clearance: 61.4 mL/min (by C-G formula based on SCr of 0.82 mg/dL).  CRP   Date Value Ref Range Status   06/02/2022 14.41 (A) mg/dL Final      "Comment:     REF VALUE  < 1.00     06/01/2022 9.87 (A) mg/dL Final     Comment:     REF VALUE  < 1.00       Sedimentation Rate   Date Value Ref Range Status   06/01/2022 10 0 - 20 mm/h Final     Comment:     Please note new reference ranges as of 5/9/2022.  Ran on alternate instrument  Reference Ranges:  Males: 0-15                     Females: 0-20                     Children under 18: 0-10     10/21/2021 13 0 - 15 mm/h Final     No results found for: \"HIV1X2\", \"HIVCONF\", \"VSQJVP7PI\"  No results found for: \"HEPCABINIT\", \"HEPCAB\", \"HCVPCRQUANT\"  Microbiology  Susceptibility data from last 90 days.  Collected Specimen Info Organism Amoxicillin/Clavulanate Ampicillin Ampicillin/Sulbactam Aztreonam Cefazolin Cefazolin (uncomplicated UTIs only) Cefepime Cefotaxime Ceftazidime Ceftriaxone Ciprofloxacin Ertapenem   07/11/24 Urine from Indwelling (Bell) Catheter Enteric bacilli               07/03/24 Urine from Clean Catch/Voided Klebsiella oxytoca/Raoultella species  S  R  I  R  R  R  R  R  R  R  S  S   05/21/24 Urine from Clean Catch/Voided Escherichia coli   S    S  S      S      Collected Specimen Info Organism Gentamicin Meropenem Nitrofurantoin Piperacillin/Tazobactam Tobramycin Trimethoprim/Sulfamethoxazole   07/11/24 Urine from Indwelling (Bell) Catheter Enteric bacilli         07/03/24 Urine from Clean Catch/Voided Klebsiella oxytoca/Raoultella species  I  S  S  S  S  R   05/21/24 Urine from Clean Catch/Voided Escherichia coli  S   S  S   S   Imaging     Assessment/Plan       IMPRESSION:    Complicated UTI with gross hematuria secondary to chronic indwelling Bell catheter  ESBL Klebsiella infection as the cause of UTI, probably contributing to progressive weakness  Chronic urinary retention secondary to benign prostatic hypertrophy  Failure of oral antibiotics with Bactrim    PLAN:  Continue IV meropenem  Midline  Arrange for total of 10 days of IV meropenem on discharge  Follow-up CBC BMP  Follow-up with " urology postdischarge  Case was discussed with the patient and family who are agreeable with above plan        Valentina May MD

## 2024-07-12 NOTE — PROCEDURES
Pre-Procedure Checklist:  Emergent Line Insertion: No  Type of Line to be Placed: Midline  Consent Obtained: N/A  Emergency Medication Necessary: No  Patient Identified with 2 Independent Identifiers: Yes  Review of Allergies, Anticoagulation, Relevant Labs, ECG/Telemetry: Yes  Risks/Benefits/Alternatives Discussed with Patient/POA/Legal Representative: Yes  Stop Sign on Door: Yes  Time Out Performed: Yes  Catheter Exchange: No    Positioning Checklist:  All People, Including Patient, in the Room with Cap and Mask: Yes  Fluoroscopy Used to Identify Vessel and Guide Insertion: No   Sterile Cover Used: Yes  Full Barrier Precautions Followed (Mask, Cap, Gown, Gloves): Yes  Hands Washed: Yes  Monitors Attached with Sound Alarms On: No  Full Body Sterile Drape (Head-to-Toe) Used to Cover Patient: Yes  Trendelenburg Position (For IJ and Subclavian): No  CHG Skin Prep Used and Allowed to Air Dry to Skin Procedure: Yes    Procedure Checklist:  Blood Aspirated From All Lumens, All Ports Subsequently Flushed: Yes  Catheter Caps Placed on All Lumens; Lumens Clamped: Yes  Maintain Guidewire Control Throughout, Ensuring Guidewire Removal: Yes  Maintain Sterile Field Throughout Insertion: Yes  Catheter Secured: Yes  Confirmatory Test of Venous Placement: Non-Pulsatile Blood    Post Procedure Checklist:  Date and Time Written on Dressing: Yes  Sharp and Wire Count and Safe Disposal of all Sharps/Wires: Yes  Sterile Dressing Applied Per Protocol: Yes  X-ray Ordered or ECG Image: N/A    Midline Insertion Details:  Midline expires in 28 days date from 7/12/24  See LDA assessment for further midline details.  Additional Details: Line was inserted using Modified Seldinger's Technique.   Midline ready for immediate use.  Placed by: Brina Reese RN

## 2024-07-12 NOTE — CARE PLAN
The patient's goals for the shift include pain at a tolerable level    The clinical goals for the shift include Patient will remain safe and HD stable during this shift    Over the shift, the patient did not make progress toward the following goals. Barriers to progression include . Recommendations to address these barriers include .    Problem: Safety - Adult  Goal: Free from fall injury  7/12/2024 0154 by Brina Joshi RN  Outcome: Progressing  7/12/2024 0154 by Brina Joshi RN  Outcome: Not Progressing     Problem: Discharge Planning  Goal: Discharge to home or other facility with appropriate resources  7/12/2024 0154 by Brina Joshi RN  Outcome: Progressing  7/12/2024 0154 by Brina Joshi RN  Outcome: Not Progressing     Problem: Chronic Conditions and Co-morbidities  Goal: Patient's chronic conditions and co-morbidity symptoms are monitored and maintained or improved  7/12/2024 0154 by Brina Joshi RN  Outcome: Progressing  7/12/2024 0154 by Brina Joshi RN  Outcome: Not Progressing     Problem: Fall/Injury  Goal: Not fall by end of shift  7/12/2024 0154 by Brina Joshi RN  Outcome: Progressing  7/12/2024 0154 by Brina Joshi RN  Outcome: Not Progressing  Goal: Be free from injury by end of the shift  7/12/2024 0154 by Brina Joshi RN  Outcome: Progressing  7/12/2024 0154 by Brina Joshi RN  Outcome: Not Progressing  Goal: Verbalize understanding of personal risk factors for fall in the hospital  7/12/2024 0154 by Brina Joshi RN  Outcome: Progressing  7/12/2024 0154 by Brina Joshi RN  Outcome: Not Progressing  Goal: Verbalize understanding of risk factor reduction measures to prevent injury from fall in the home  7/12/2024 0154 by Brina Joshi RN  Outcome: Progressing  7/12/2024 0154 by Brina Joshi RN  Outcome: Not Progressing  Goal: Use assistive devices by end of the shift  7/12/2024 0154 by Brina Joshi RN  Outcome: Progressing  7/12/2024 0154 by Brina Joshi RN  Outcome: Not Progressing  Goal: Pace  activities to prevent fatigue by end of the shift  7/12/2024 0154 by Brina Joshi RN  Outcome: Progressing  7/12/2024 0154 by Brina Joshi RN  Outcome: Not Progressing     Problem: Skin  Goal: Decreased wound size/increased tissue granulation at next dressing change  7/12/2024 0154 by Brina Joshi RN  Outcome: Progressing  Flowsheets (Taken 7/12/2024 0154)  Decreased wound size/increased tissue granulation at next dressing change:   Promote sleep for wound healing   Utilize specialty bed per algorithm   Protective dressings over bony prominences  7/12/2024 0154 by Brina Joshi RN  Outcome: Not Progressing  Flowsheets (Taken 7/12/2024 0154)  Decreased wound size/increased tissue granulation at next dressing change:   Promote sleep for wound healing   Utilize specialty bed per algorithm   Protective dressings over bony prominences  Goal: Participates in plan/prevention/treatment measures  7/12/2024 0154 by Brina Joshi RN  Outcome: Progressing  Flowsheets (Taken 7/12/2024 0154)  Participates in plan/prevention/treatment measures:   Discuss with provider PT/OT consult   Increase activity/out of bed for meals   Elevate heels  7/12/2024 0154 by Brina Joshi RN  Outcome: Not Progressing  Flowsheets (Taken 7/12/2024 0154)  Participates in plan/prevention/treatment measures:   Discuss with provider PT/OT consult   Increase activity/out of bed for meals   Elevate heels  Goal: Prevent/manage excess moisture  7/12/2024 0154 by Brina Joshi RN  Outcome: Progressing  Flowsheets (Taken 7/12/2024 0154)  Prevent/manage excess moisture:   Cleanse incontinence/protect with barrier cream   Moisturize dry skin   Use wicking fabric (obtain order)   Follow provider orders for dressing changes   Monitor for/manage infection if present  7/12/2024 0154 by Brina Joshi RN  Outcome: Not Progressing  Flowsheets (Taken 7/12/2024 0154)  Prevent/manage excess moisture:   Cleanse incontinence/protect with barrier cream   Moisturize dry skin   Use  wicking fabric (obtain order)   Follow provider orders for dressing changes   Monitor for/manage infection if present  Goal: Prevent/minimize sheer/friction injuries  7/12/2024 0154 by Brina Joshi RN  Outcome: Progressing  Flowsheets (Taken 7/12/2024 0154)  Prevent/minimize sheer/friction injuries:   Complete micro-shifts as needed if patient unable. Adjust patient position to relieve pressure points, not a full turn   HOB 30 degrees or less   Turn/reposition every 2 hours/use positioning/transfer devices   Increase activity/out of bed for meals   Use pull sheet   Utilize specialty bed per algorithm  7/12/2024 0154 by Brina Joshi RN  Outcome: Not Progressing  Flowsheets (Taken 7/12/2024 0154)  Prevent/minimize sheer/friction injuries:   Complete micro-shifts as needed if patient unable. Adjust patient position to relieve pressure points, not a full turn   HOB 30 degrees or less   Turn/reposition every 2 hours/use positioning/transfer devices   Increase activity/out of bed for meals   Use pull sheet   Utilize specialty bed per algorithm  Goal: Promote/optimize nutrition  7/12/2024 0154 by Brina Joshi RN  Outcome: Progressing  Flowsheets (Taken 7/12/2024 0154)  Promote/optimize nutrition:   Assist with feeding   Discuss with provider if NPO > 2 days   Offer water/supplements/favorite foods   Consume > 50% meals/supplements   Monitor/record intake including meals   Reassess MST if dietician not consulted  7/12/2024 0154 by Brina Joshi RN  Outcome: Not Progressing  Flowsheets (Taken 7/12/2024 0154)  Promote/optimize nutrition:   Assist with feeding   Discuss with provider if NPO > 2 days   Offer water/supplements/favorite foods   Consume > 50% meals/supplements   Monitor/record intake including meals   Reassess MST if dietician not consulted  Goal: Promote skin healing  7/12/2024 0154 by Brina Joshi RN  Outcome: Progressing  Flowsheets (Taken 7/12/2024 0154)  Promote skin healing:   Assess skin/pad under  line(s)/device(s)   Ensure correct size (line/device) and apply per  instructions   Protective dressings over bony prominences   Rotate device position/do not position patient on device   Turn/reposition every 2 hours/use positioning/transfer devices  7/12/2024 0154 by Brina Joshi RN  Outcome: Not Progressing  Flowsheets (Taken 7/12/2024 0154)  Promote skin healing:   Assess skin/pad under line(s)/device(s)   Ensure correct size (line/device) and apply per  instructions   Protective dressings over bony prominences   Rotate device position/do not position patient on device   Turn/reposition every 2 hours/use positioning/transfer devices

## 2024-07-13 LAB — BACTERIA UR CULT: ABNORMAL

## 2024-07-13 PROCEDURE — 2500000004 HC RX 250 GENERAL PHARMACY W/ HCPCS (ALT 636 FOR OP/ED): Performed by: INTERNAL MEDICINE

## 2024-07-13 NOTE — CARE PLAN
The patient's goals for the shift include pain at a tolerable level    The clinical goals for the shift include patient will have no signs of hematuria through out shift    Over the shift, the patient did not make progress toward the following goals. Barriers to progression include . Recommendations to address these barriers include .

## 2024-07-13 NOTE — NURSING NOTE
[facility] here to transport patient to [facility]. Belongings sent with patient.

## 2024-07-23 ENCOUNTER — DOCUMENTATION (OUTPATIENT)
Dept: HOME HEALTH SERVICES | Facility: HOME HEALTH | Age: 79
End: 2024-07-23
Payer: MEDICARE

## 2024-07-23 NOTE — HH CARE COORDINATION
Home Care received a Referral to Resume Care for Nursing, Physical Therapy, Occupational Therapy, and Speech Language Pathology. We have processed the referral for a Resumption of Care on 24-48 HOURS .     If you have any questions or concerns, please feel free to contact us at [phone]. Follow the prompts, enter your five digit zip code, and you will be directed to your care team on [facility].

## 2024-07-24 ENCOUNTER — HOME CARE VISIT (OUTPATIENT)
Dept: HOME HEALTH SERVICES | Facility: HOME HEALTH | Age: 79
End: 2024-07-24
Payer: MEDICARE

## 2024-07-24 PROCEDURE — G0299 HHS/HOSPICE OF RN EA 15 MIN: HCPCS | Mod: HHH

## 2024-07-25 ENCOUNTER — HOME CARE VISIT (OUTPATIENT)
Dept: HOME HEALTH SERVICES | Facility: HOME HEALTH | Age: 79
End: 2024-07-25
Payer: MEDICARE

## 2024-07-25 VITALS
SYSTOLIC BLOOD PRESSURE: 104 MMHG | HEART RATE: 98 BPM | OXYGEN SATURATION: 96 % | RESPIRATION RATE: 14 BRPM | DIASTOLIC BLOOD PRESSURE: 50 MMHG | TEMPERATURE: 98.8 F

## 2024-07-25 VITALS
RESPIRATION RATE: 20 BRPM | TEMPERATURE: 97.6 F | DIASTOLIC BLOOD PRESSURE: 74 MMHG | HEART RATE: 70 BPM | OXYGEN SATURATION: 97 % | SYSTOLIC BLOOD PRESSURE: 122 MMHG

## 2024-07-25 VITALS
SYSTOLIC BLOOD PRESSURE: 124 MMHG | TEMPERATURE: 98.7 F | HEART RATE: 72 BPM | RESPIRATION RATE: 18 BRPM | DIASTOLIC BLOOD PRESSURE: 72 MMHG | OXYGEN SATURATION: 97 %

## 2024-07-25 PROCEDURE — G0152 HHCP-SERV OF OT,EA 15 MIN: HCPCS | Mod: HHH

## 2024-07-25 PROCEDURE — G0299 HHS/HOSPICE OF RN EA 15 MIN: HCPCS | Mod: HHH

## 2024-07-25 PROCEDURE — G0151 HHCP-SERV OF PT,EA 15 MIN: HCPCS | Mod: HHH

## 2024-07-25 SDOH — HEALTH STABILITY: PHYSICAL HEALTH: PHYSICAL EXERCISE: SUPINE

## 2024-07-25 SDOH — HEALTH STABILITY: PHYSICAL HEALTH: PHYSICAL EXERCISE: 15

## 2024-07-25 SDOH — HEALTH STABILITY: PHYSICAL HEALTH: EXERCISE ACTIVITIES SETS: 1

## 2024-07-25 SDOH — HEALTH STABILITY: PHYSICAL HEALTH: EXERCISE ACTIVITY: SLR

## 2024-07-25 SDOH — HEALTH STABILITY: PHYSICAL HEALTH: EXERCISE ACTIVITY: ANKLE PUMPS

## 2024-07-25 SDOH — HEALTH STABILITY: PHYSICAL HEALTH: EXERCISE TYPE: LE EXERCISES

## 2024-07-25 SDOH — HEALTH STABILITY: PHYSICAL HEALTH: EXERCISE ACTIVITY: HIP ABDUCTION SLIDES

## 2024-07-25 SDOH — HEALTH STABILITY: PHYSICAL HEALTH: EXERCISE ACTIVITY: GLUT SETS

## 2024-07-25 SDOH — HEALTH STABILITY: PHYSICAL HEALTH: EXERCISE ACTIVITY: SAQ

## 2024-07-25 SDOH — HEALTH STABILITY: PHYSICAL HEALTH: EXERCISE ACTIVITY: HEEL SLIDES

## 2024-07-25 SDOH — HEALTH STABILITY: PHYSICAL HEALTH: EXERCISE ACTIVITY: QUAD SETS

## 2024-07-25 SDOH — ECONOMIC STABILITY: FOOD INSECURITY: MEALS PER DAY: 3

## 2024-07-25 SDOH — ECONOMIC STABILITY: HOUSING INSECURITY
HOME SAFETY: PT LIVES ALONE IN 1 STORY HOME. FULL BATH, TRANSFER POLE, WALK IN SHOWER, GRAB BARS, SHOWER CHAIR. BEDROOM ALSO HAS A TRANSFER POLE.

## 2024-07-25 ASSESSMENT — BALANCE ASSESSMENTS
ARISES: 1 - ABLE, USES ARMS TO HELP
NUDGED SCORE: 0
IMMEDIATE STANDING BALANCE FIRST 5 SECONDS: 1 - STEADY BUT USES WALKER OR OTHER SUPPORT
SITTING DOWN: 1 - USES ARMS OR NOT SMOOTH MOTION
NUDGED: 0 - BEGINS TO FALL
ATTEMPTS TO ARISE: 2 - ABLE TO RISE, ONE ATTEMPT
ARISING SCORE: 1
STANDING BALANCE: 1 - STEADY BUT WIDE STANCE AND USES CANE OR OTHER SUPPORT
BALANCE SCORE: 7
SITTING BALANCE: 1 - STEADY, SAFE
EYES CLOSED AT MAXIMUM POSITION NUDGED: 0 - UNSTEADY
TURNING 360 DEGREES STEPS: 0 - DISCONTINUOUS STEPS

## 2024-07-25 ASSESSMENT — PAIN SCALES - PAIN ASSESSMENT IN ADVANCED DEMENTIA (PAINAD)
TOTALSCORE: 0
FACIALEXPRESSION: 0 - SMILING OR INEXPRESSIVE.
NEGVOCALIZATION: 0 - NONE.
BREATHING: 0
CONSOLABILITY: 0 - NO NEED TO CONSOLE.
NEGVOCALIZATION: 0
FACIALEXPRESSION: 0
BODYLANGUAGE: 0
BODYLANGUAGE: 0 - RELAXED.
CONSOLABILITY: 0

## 2024-07-25 ASSESSMENT — ENCOUNTER SYMPTOMS
FATIGUES EASILY: 1
DEPRESSION: 1
MUSCLE WEAKNESS: 1
MUSCLE WEAKNESS: 1
DESCRIPTION OF MEMORY LOSS: SHORT TERM
DENIES PAIN: 1
DENIES PAIN: 1
CHANGE IN APPETITE: UNCHANGED
LIMITED RANGE OF MOTION: 1
FATIGUE: 1
LAST BOWEL MOVEMENT: 67044
APPETITE LEVEL: GOOD
PERSON REPORTING PAIN: PATIENT
DENIES PAIN: 1
MUSCLE WEAKNESS: 1
BOWEL PATTERN NORMAL: 1
DENIES PAIN: 1
OCCASIONAL FEELINGS OF UNSTEADINESS: 1
LOSS OF SENSATION IN FEET: 0
APPETITE LEVEL: FAIR
PERSON REPORTING PAIN: PATIENT

## 2024-07-25 ASSESSMENT — GAIT ASSESSMENTS
INITIATION OF GAIT IMMEDIATELY AFTER GO: 1 - NO HESITANCY
GAIT SCORE: 6
WALKING STANCE: 1 - HEELS ALMOST TOUCHING WHILE WALKING
PATH SCORE: 0
TRUNK: 0 - MARKED SWAY OR USES WALKING AID
PATH: 0 - MARKED DEVIATION
BALANCE AND GAIT SCORE: 13
STEP CONTINUITY: 1 - STEPS APPEAR CONTINUOUS
STEP SYMMETRY: 1 - RIGHT AND LEFT STEP LENGTH APPEAR EQUAL
TRUNK SCORE: 0

## 2024-07-25 ASSESSMENT — ACTIVITIES OF DAILY LIVING (ADL)
AMBULATION ASSISTANCE: ONE PERSON
GROOMING ASSESSED: 1
AMBULATION ASSISTANCE: 1
PHYSICAL_TRANSFERS_DEVICES: WALKER
DRESSING_UB_CURRENT_FUNCTION: MINIMUM ASSIST
ENTERING_EXITING_HOME: MAXIMUM ASSIST
CURRENT_FUNCTION: ONE PERSON
AMBULATION_DISTANCE/DURATION_TOLERATED: 25 FT
AMBULATION ASSISTANCE: ONE PERSON
BATHING_CURRENT_FUNCTION: MODERATE ASSIST
PHYSICAL TRANSFERS ASSESSED: 1
AMBULATION ASSISTANCE ON FLAT SURFACES: 1
CURRENT_FUNCTION: ONE PERSON
GROOMING_CURRENT_FUNCTION: MINIMUM ASSIST
PHYSICAL TRANSFERS ASSESSED: 1
BATHING ASSESSED: 1
CURRENT_FUNCTION: INDEPENDENT
OASIS_M1830: 03
DRESSING_LB_CURRENT_FUNCTION: MODERATE ASSIST

## 2024-07-26 ENCOUNTER — HOME CARE VISIT (OUTPATIENT)
Dept: HOME HEALTH SERVICES | Facility: HOME HEALTH | Age: 79
End: 2024-07-26
Payer: MEDICARE

## 2024-07-26 VITALS — HEART RATE: 80 BPM | OXYGEN SATURATION: 94 %

## 2024-07-26 PROCEDURE — G0153 HHCP-SVS OF S/L PATH,EA 15MN: HCPCS | Mod: HHH

## 2024-07-26 ASSESSMENT — PAIN SCALES - PAIN ASSESSMENT IN ADVANCED DEMENTIA (PAINAD)
CONSOLABILITY: 0 - NO NEED TO CONSOLE.
BREATHING: 0
FACIALEXPRESSION: 0
CONSOLABILITY: 0
NEGVOCALIZATION: 0 - NONE.
BODYLANGUAGE: 0
TOTALSCORE: 0
NEGVOCALIZATION: 0
BODYLANGUAGE: 0 - RELAXED.
FACIALEXPRESSION: 0 - SMILING OR INEXPRESSIVE.

## 2024-07-26 ASSESSMENT — ENCOUNTER SYMPTOMS
PERSON REPORTING PAIN: PATIENT
ANGER WITHIN DEFINED LIMITS: 1
DENIES PAIN: 1
AGGRESSION WITHIN DEFINED LIMITS: 1

## 2024-07-27 ENCOUNTER — HOME CARE VISIT (OUTPATIENT)
Dept: HOME HEALTH SERVICES | Facility: HOME HEALTH | Age: 79
End: 2024-07-27
Payer: MEDICARE

## 2024-07-27 VITALS
HEART RATE: 73 BPM | SYSTOLIC BLOOD PRESSURE: 132 MMHG | TEMPERATURE: 98.5 F | DIASTOLIC BLOOD PRESSURE: 79 MMHG | RESPIRATION RATE: 18 BRPM | OXYGEN SATURATION: 97 %

## 2024-07-27 PROCEDURE — G0299 HHS/HOSPICE OF RN EA 15 MIN: HCPCS | Mod: HHH

## 2024-07-27 SDOH — ECONOMIC STABILITY: GENERAL

## 2024-07-27 ASSESSMENT — ENCOUNTER SYMPTOMS
CHANGE IN APPETITE: DECREASED
PERSON REPORTING PAIN: PATIENT
PAIN: 1
SUBJECTIVE PAIN PROGRESSION: RESOLVED
MUSCLE WEAKNESS: 1
APPETITE LEVEL: FAIR

## 2024-07-27 ASSESSMENT — ACTIVITIES OF DAILY LIVING (ADL): MONEY MANAGEMENT (EXPENSES/BILLS): NEEDS ASSISTANCE

## 2024-07-29 ENCOUNTER — TELEPHONE (OUTPATIENT)
Dept: PRIMARY CARE | Facility: CLINIC | Age: 79
End: 2024-07-29
Payer: MEDICARE

## 2024-07-29 ENCOUNTER — HOME CARE VISIT (OUTPATIENT)
Dept: HOME HEALTH SERVICES | Facility: HOME HEALTH | Age: 79
End: 2024-07-29
Payer: MEDICARE

## 2024-07-29 VITALS
TEMPERATURE: 98.2 F | OXYGEN SATURATION: 96 % | SYSTOLIC BLOOD PRESSURE: 118 MMHG | RESPIRATION RATE: 14 BRPM | DIASTOLIC BLOOD PRESSURE: 70 MMHG | HEART RATE: 68 BPM

## 2024-07-29 PROCEDURE — G0151 HHCP-SERV OF PT,EA 15 MIN: HCPCS | Mod: HHH

## 2024-07-29 SDOH — HEALTH STABILITY: PHYSICAL HEALTH: PHYSICAL EXERCISE: 15

## 2024-07-29 SDOH — HEALTH STABILITY: PHYSICAL HEALTH: PHYSICAL EXERCISE: SEATED

## 2024-07-29 SDOH — HEALTH STABILITY: PHYSICAL HEALTH: EXERCISE ACTIVITIES SETS: 1

## 2024-07-29 SDOH — HEALTH STABILITY: PHYSICAL HEALTH: EXERCISE ACTIVITY: HS CURLS

## 2024-07-29 SDOH — HEALTH STABILITY: PHYSICAL HEALTH: EXERCISE TYPE: LE EXERCISES

## 2024-07-29 SDOH — HEALTH STABILITY: PHYSICAL HEALTH: EXERCISE ACTIVITY: LAQ

## 2024-07-29 SDOH — HEALTH STABILITY: PHYSICAL HEALTH

## 2024-07-29 SDOH — HEALTH STABILITY: PHYSICAL HEALTH: RESISTANCE: ORANGE THERABAND

## 2024-07-29 SDOH — HEALTH STABILITY: PHYSICAL HEALTH: EXERCISE ACTIVITY: MARCHING

## 2024-07-29 SDOH — HEALTH STABILITY: PHYSICAL HEALTH: EXERCISE ACTIVITY: ANKLE DF/PF

## 2024-07-29 SDOH — HEALTH STABILITY: PHYSICAL HEALTH: EXERCISE ACTIVITY: HIP EXTENSION

## 2024-07-29 SDOH — HEALTH STABILITY: PHYSICAL HEALTH: EXERCISE ACTIVITY: HIP ABDUCTION

## 2024-07-29 ASSESSMENT — ACTIVITIES OF DAILY LIVING (ADL)
AMBULATION ASSISTANCE ON FLAT SURFACES: 1
AMBULATION ASSISTANCE: STAND BY ASSIST
AMBULATION_DISTANCE/DURATION_TOLERATED: 50 FT X 2
CURRENT_FUNCTION: STAND BY ASSIST

## 2024-07-29 ASSESSMENT — ENCOUNTER SYMPTOMS
DENIES PAIN: 1
PERSON REPORTING PAIN: PATIENT
MUSCLE WEAKNESS: 1

## 2024-07-29 NOTE — TELEPHONE ENCOUNTER
Pt's son is requesting hospice help for pt    They were told you can put this order in.    Please advise, thanks!

## 2024-07-30 ENCOUNTER — HOME CARE VISIT (OUTPATIENT)
Dept: HOME HEALTH SERVICES | Facility: HOME HEALTH | Age: 79
End: 2024-07-30
Payer: MEDICARE

## 2024-07-30 VITALS
HEART RATE: 89 BPM | WEIGHT: 160 LBS | RESPIRATION RATE: 20 BRPM | BODY MASS INDEX: 26.66 KG/M2 | DIASTOLIC BLOOD PRESSURE: 60 MMHG | SYSTOLIC BLOOD PRESSURE: 125 MMHG | OXYGEN SATURATION: 97 % | HEIGHT: 65 IN | TEMPERATURE: 99.3 F

## 2024-07-30 PROCEDURE — G0299 HHS/HOSPICE OF RN EA 15 MIN: HCPCS | Mod: HHH

## 2024-07-30 ASSESSMENT — ENCOUNTER SYMPTOMS
LIMITED RANGE OF MOTION: 1
MUSCLE WEAKNESS: 1
LAST BOWEL MOVEMENT: 67049
APPETITE LEVEL: GOOD
STOOL FREQUENCY: LESS THAN DAILY
DENIES PAIN: 1

## 2024-07-30 ASSESSMENT — ACTIVITIES OF DAILY LIVING (ADL)
ENTERING_EXITING_HOME: ONE PERSON
AMBULATION ASSISTANCE: 1
CURRENT_FUNCTION: STAND BY ASSIST
PHYSICAL TRANSFERS ASSESSED: 1
AMBULATION ASSISTANCE: STAND BY ASSIST
PHYSICAL_TRANSFERS_DEVICES: WALKER
OASIS_M1830: 03

## 2024-07-31 ENCOUNTER — HOME CARE VISIT (OUTPATIENT)
Dept: HOME HEALTH SERVICES | Facility: HOME HEALTH | Age: 79
End: 2024-07-31
Payer: MEDICARE

## 2024-07-31 VITALS
SYSTOLIC BLOOD PRESSURE: 122 MMHG | DIASTOLIC BLOOD PRESSURE: 68 MMHG | OXYGEN SATURATION: 97 % | TEMPERATURE: 98.2 F | RESPIRATION RATE: 14 BRPM | HEART RATE: 68 BPM

## 2024-07-31 PROCEDURE — G0151 HHCP-SERV OF PT,EA 15 MIN: HCPCS | Mod: HHH

## 2024-07-31 SDOH — HEALTH STABILITY: PHYSICAL HEALTH: PHYSICAL EXERCISE: 15

## 2024-07-31 SDOH — HEALTH STABILITY: PHYSICAL HEALTH: PHYSICAL EXERCISE: SEATED

## 2024-07-31 SDOH — HEALTH STABILITY: PHYSICAL HEALTH: EXERCISE ACTIVITY: ANKLE DF/PF

## 2024-07-31 SDOH — HEALTH STABILITY: PHYSICAL HEALTH

## 2024-07-31 SDOH — HEALTH STABILITY: PHYSICAL HEALTH: EXERCISE ACTIVITY: HIP EXTENSION

## 2024-07-31 SDOH — HEALTH STABILITY: PHYSICAL HEALTH: EXERCISE ACTIVITY: HIP ABDUCTION

## 2024-07-31 SDOH — HEALTH STABILITY: PHYSICAL HEALTH: RESISTANCE: ORANGE THERABAND

## 2024-07-31 SDOH — HEALTH STABILITY: PHYSICAL HEALTH: EXERCISE ACTIVITIES SETS: 1

## 2024-07-31 SDOH — HEALTH STABILITY: PHYSICAL HEALTH: EXERCISE ACTIVITY: MARCHING

## 2024-07-31 SDOH — HEALTH STABILITY: PHYSICAL HEALTH: EXERCISE TYPE: LE EXERCISES

## 2024-07-31 SDOH — HEALTH STABILITY: PHYSICAL HEALTH: EXERCISE ACTIVITY: LAQ

## 2024-07-31 SDOH — HEALTH STABILITY: PHYSICAL HEALTH: EXERCISE ACTIVITY: HS CURLS

## 2024-07-31 ASSESSMENT — ACTIVITIES OF DAILY LIVING (ADL)
AMBULATION_DISTANCE/DURATION_TOLERATED: 75 FT X 2
AMBULATION ASSISTANCE: STAND BY ASSIST
CURRENT_FUNCTION: STAND BY ASSIST
AMBULATION ASSISTANCE ON FLAT SURFACES: 1

## 2024-07-31 ASSESSMENT — BALANCE ASSESSMENTS
IMMEDIATE STANDING BALANCE FIRST 5 SECONDS: 1 - STEADY BUT USES WALKER OR OTHER SUPPORT
SITTING BALANCE: 1 - STEADY, SAFE
ATTEMPTS TO ARISE: 2 - ABLE TO RISE, ONE ATTEMPT
TURNING 360 DEGREES STEPS: 0 - DISCONTINUOUS STEPS
NUDGED: 0 - BEGINS TO FALL
NUDGED SCORE: 0
EYES CLOSED AT MAXIMUM POSITION NUDGED: 0 - UNSTEADY
SITTING DOWN: 1 - USES ARMS OR NOT SMOOTH MOTION
STANDING BALANCE: 1 - STEADY BUT WIDE STANCE AND USES CANE OR OTHER SUPPORT
ARISES: 1 - ABLE, USES ARMS TO HELP
BALANCE SCORE: 8
ARISING SCORE: 1

## 2024-07-31 ASSESSMENT — ENCOUNTER SYMPTOMS
DENIES PAIN: 1
MUSCLE WEAKNESS: 1
PERSON REPORTING PAIN: PATIENT

## 2024-07-31 ASSESSMENT — GAIT ASSESSMENTS
BALANCE AND GAIT SCORE: 14
STEP CONTINUITY: 1 - STEPS APPEAR CONTINUOUS
PATH: 0 - MARKED DEVIATION
INITIATION OF GAIT IMMEDIATELY AFTER GO: 1 - NO HESITANCY
TRUNK: 0 - MARKED SWAY OR USES WALKING AID
STEP SYMMETRY: 1 - RIGHT AND LEFT STEP LENGTH APPEAR EQUAL
WALKING STANCE: 1 - HEELS ALMOST TOUCHING WHILE WALKING
PATH SCORE: 0
GAIT SCORE: 6
TRUNK SCORE: 0

## 2024-08-06 ENCOUNTER — HOME CARE VISIT (OUTPATIENT)
Dept: HOME HEALTH SERVICES | Facility: HOME HEALTH | Age: 79
End: 2024-08-06
Payer: MEDICARE

## 2024-08-06 VITALS
RESPIRATION RATE: 14 BRPM | OXYGEN SATURATION: 98 % | SYSTOLIC BLOOD PRESSURE: 122 MMHG | HEART RATE: 79 BPM | DIASTOLIC BLOOD PRESSURE: 70 MMHG | TEMPERATURE: 98.3 F

## 2024-08-06 VITALS
HEART RATE: 92 BPM | RESPIRATION RATE: 18 BRPM | OXYGEN SATURATION: 96 % | SYSTOLIC BLOOD PRESSURE: 120 MMHG | DIASTOLIC BLOOD PRESSURE: 80 MMHG | TEMPERATURE: 97.3 F

## 2024-08-06 PROCEDURE — G0151 HHCP-SERV OF PT,EA 15 MIN: HCPCS | Mod: HHH

## 2024-08-06 PROCEDURE — G0299 HHS/HOSPICE OF RN EA 15 MIN: HCPCS | Mod: HHH

## 2024-08-06 SDOH — HEALTH STABILITY: PHYSICAL HEALTH: PHYSICAL EXERCISE: 15

## 2024-08-06 SDOH — HEALTH STABILITY: PHYSICAL HEALTH: EXERCISE ACTIVITIES SETS: 1

## 2024-08-06 SDOH — ECONOMIC STABILITY: GENERAL

## 2024-08-06 SDOH — HEALTH STABILITY: PHYSICAL HEALTH: EXERCISE ACTIVITY: ANKLE DF/PF

## 2024-08-06 SDOH — HEALTH STABILITY: PHYSICAL HEALTH: PHYSICAL EXERCISE: SEATED

## 2024-08-06 SDOH — HEALTH STABILITY: PHYSICAL HEALTH: EXERCISE ACTIVITY: HS CURLS

## 2024-08-06 SDOH — HEALTH STABILITY: PHYSICAL HEALTH: RESISTANCE: ORANGE THERABAND

## 2024-08-06 SDOH — HEALTH STABILITY: PHYSICAL HEALTH

## 2024-08-06 SDOH — HEALTH STABILITY: PHYSICAL HEALTH: EXERCISE TYPE: LE EXERISES

## 2024-08-06 SDOH — HEALTH STABILITY: PHYSICAL HEALTH: EXERCISE ACTIVITY: MARCHING

## 2024-08-06 SDOH — HEALTH STABILITY: PHYSICAL HEALTH: EXERCISE ACTIVITY: HIP EXTENSION

## 2024-08-06 SDOH — HEALTH STABILITY: PHYSICAL HEALTH: EXERCISE ACTIVITY: LAQ

## 2024-08-06 SDOH — HEALTH STABILITY: PHYSICAL HEALTH: EXERCISE ACTIVITY: HIP ABDUCTION

## 2024-08-06 ASSESSMENT — PAIN SCALES - PAIN ASSESSMENT IN ADVANCED DEMENTIA (PAINAD)
TOTALSCORE: 0
NEGVOCALIZATION: 0 - NONE.
CONSOLABILITY: 0 - NO NEED TO CONSOLE.
CONSOLABILITY: 0
BODYLANGUAGE: 0 - RELAXED.
FACIALEXPRESSION: 0 - SMILING OR INEXPRESSIVE.
NEGVOCALIZATION: 0
FACIALEXPRESSION: 0
BREATHING: 0
BODYLANGUAGE: 0

## 2024-08-06 ASSESSMENT — ENCOUNTER SYMPTOMS
OCCASIONAL FEELINGS OF UNSTEADINESS: 1
LOSS OF SENSATION IN FEET: 0
CHANGE IN APPETITE: UNCHANGED
MUSCLE WEAKNESS: 1
DENIES PAIN: 1
APPETITE LEVEL: GOOD
DENIES PAIN: 1
DEPRESSION: 0
PERSON REPORTING PAIN: PATIENT
PERSON REPORTING PAIN: PATIENT

## 2024-08-06 ASSESSMENT — ACTIVITIES OF DAILY LIVING (ADL)
AMBULATION_DISTANCE/DURATION_TOLERATED: LE EXERCISES
MONEY MANAGEMENT (EXPENSES/BILLS): NEEDS ASSISTANCE
AMBULATION ASSISTANCE: STAND BY ASSIST
CURRENT_FUNCTION: STAND BY ASSIST
AMBULATION ASSISTANCE ON FLAT SURFACES: 1

## 2024-08-07 ENCOUNTER — PREP FOR PROCEDURE (OUTPATIENT)
Dept: UROLOGY | Facility: HOSPITAL | Age: 79
End: 2024-08-07

## 2024-08-07 ENCOUNTER — OFFICE VISIT (OUTPATIENT)
Dept: UROLOGY | Facility: HOSPITAL | Age: 79
End: 2024-08-07
Payer: MEDICARE

## 2024-08-07 ENCOUNTER — HOME CARE VISIT (OUTPATIENT)
Dept: HOME HEALTH SERVICES | Facility: HOME HEALTH | Age: 79
End: 2024-08-07
Payer: MEDICARE

## 2024-08-07 DIAGNOSIS — N40.1 BENIGN PROSTATIC HYPERPLASIA WITH URINARY RETENTION: Primary | ICD-10-CM

## 2024-08-07 DIAGNOSIS — G20.B2 PARKINSON'S DISEASE WITH DYSKINESIA AND FLUCTUATING MANIFESTATIONS (MULTI): ICD-10-CM

## 2024-08-07 DIAGNOSIS — R31.0 GROSS HEMATURIA: ICD-10-CM

## 2024-08-07 DIAGNOSIS — R33.8 BENIGN PROSTATIC HYPERPLASIA WITH URINARY RETENTION: Primary | ICD-10-CM

## 2024-08-07 PROCEDURE — G2211 COMPLEX E/M VISIT ADD ON: HCPCS | Performed by: UROLOGY

## 2024-08-07 PROCEDURE — 1157F ADVNC CARE PLAN IN RCRD: CPT | Performed by: UROLOGY

## 2024-08-07 PROCEDURE — 99215 OFFICE O/P EST HI 40 MIN: CPT | Performed by: UROLOGY

## 2024-08-07 PROCEDURE — 1111F DSCHRG MED/CURRENT MED MERGE: CPT | Performed by: UROLOGY

## 2024-08-07 RX ORDER — SULFAMETHOXAZOLE AND TRIMETHOPRIM 800; 160 MG/1; MG/1
1 TABLET ORAL 2 TIMES DAILY
Qty: 16 TABLET | Refills: 0 | Status: SHIPPED | OUTPATIENT
Start: 2024-08-07 | End: 2024-08-15

## 2024-08-07 RX ORDER — CEFAZOLIN SODIUM 2 G/100ML
2 INJECTION, SOLUTION INTRAVENOUS ONCE
OUTPATIENT
Start: 2024-08-07 | End: 2024-08-07

## 2024-08-07 NOTE — PROGRESS NOTES
HPI    79 y.o. male being seen with the following problem list:    Problem list:  BPH with bothersome LUTs  PD     He has Parkinson's disease. For his urinary symptoms he is on doxazosin, Flomax, finasteride. He is on Eliquis     Prostate vol (calc from CT pelvis 2022) - 248cc     Referred by Dr. Faulkner for bothersome urinary symptoms and concerns for prostate cancer. Last PSA 9/2021 was 16.3 (32 corrected for finasteride). From a urinary standpoint he has urgency, frequency, urge incontinence. Urination is sporadic. He is overall not too bothered with his urination. He wears depends. No prior prostate biopsy. Spot bladder scan today was 67cc and he voided about 3 hrs ago     PSA 8/3/23 - 8.57     9/22/23 - seen today in follow up for repeat PSA. Myrbetriq has not improved his urinary symptoms.     Patient admitted at [facility] with SUBHASH and bilateral hydro on 5/29/24. Bell placed     7/9/24 - cysto, clot evac with Dr. Gupta    08/07/24 -seen back in follow-up.  Miserable with the catheter.  He is having to have nurse visit several times a week in order to deal with catheter clogging and hematuria.  He has had procedures as above to deal with complications of hematuria.  He would like to do like to get rid of the catheter and the recurrent bleeding.      Lab Results   Component Value Date    PSA 8.57 (H) 08/03/2023    PSA 16.30 (H) 09/29/2021         Current Medications:  Current Outpatient Medications   Medication Sig Dispense Refill    DULoxetine (Cymbalta) 30 mg DR capsule Take 1 capsule (30 mg) by mouth once daily for 7 days, THEN 2 capsules (60 mg) once daily. Do not crush or chew.. 30 capsule 2    finasteride (Proscar) 5 mg tablet Take 1 tablet (5 mg) by mouth once daily. 90 tablet 3    magnesium oxide (Mag-Ox) 400 mg (241.3 mg magnesium) tablet Take 1 tablet (400 mg) by mouth once daily. 90 tablet 2    melatonin 3 mg tablet Take 1 tablet (3 mg) by mouth once daily at bedtime.      tamsulosin (Flomax) 0.4 mg  24 hr capsule Take 1 capsule (0.4 mg) by mouth once daily. 90 capsule 3     No current facility-administered medications for this visit.        Active Problems:  Marco Antonio Merlos Jr. is a 79 y.o. male with the following Problems and Medications.  Patient Active Problem List   Diagnosis    Abnormal serum protein test    Abrasion    Anemia    Acute prostatitis    BPH with obstruction/lower urinary tract symptoms    Urinary urgency    Thromboembolism (Multi)    Stenosis, cervical spine    Spinal stenosis, lumbar region without neurogenic claudication    Scoliosis, unspecified    Right shoulder pain    Rib pain on right side    Restless legs syndrome    Parkinson disease (Multi)    Primary osteoarthritis of right knee    Nocturia    Multiple rib fractures    Myelomalacia (Multi)    Fracture of rib of left side    Low iron    Back pain    Lipoma    Left leg weakness    Left leg numbness    Left ankle pain    Joint pain, knee    Hyperreflexia    Status post total knee replacement    Gait instability    Gait disturbance    Fatigue    Elevated PSA    Elevated d-dimer    Dependence on wheelchair    Cervical spondylosis with myelopathy    Cellulitis    Abnormal CT scan, esophagus    Dysphagia    Hip fracture, left (Multi)    Quadriceps weakness    Lumbar spinal stenosis    Acute respiratory failure with hypoxia (Multi)    Coagulation defect, unspecified (Multi)    Gross hematuria     Current Outpatient Medications   Medication Sig Dispense Refill    DULoxetine (Cymbalta) 30 mg DR capsule Take 1 capsule (30 mg) by mouth once daily for 7 days, THEN 2 capsules (60 mg) once daily. Do not crush or chew.. 30 capsule 2    finasteride (Proscar) 5 mg tablet Take 1 tablet (5 mg) by mouth once daily. 90 tablet 3    magnesium oxide (Mag-Ox) 400 mg (241.3 mg magnesium) tablet Take 1 tablet (400 mg) by mouth once daily. 90 tablet 2    melatonin 3 mg tablet Take 1 tablet (3 mg) by mouth once daily at bedtime.      tamsulosin (Flomax) 0.4 mg  24 hr capsule Take 1 capsule (0.4 mg) by mouth once daily. 90 capsule 3     No current facility-administered medications for this visit.       PMH:  Past Medical History:   Diagnosis Date    Personal history of other diseases of the musculoskeletal system and connective tissue 03/17/2022    History of arthritis    Personal history of other specified conditions 03/17/2022    History of balance disorder       PSH:  Past Surgical History:   Procedure Laterality Date    OTHER SURGICAL HISTORY  03/17/2022    Joint replacement procedure       FMH:  No family history on file.    SHx:  Social History     Tobacco Use    Smoking status: Never    Smokeless tobacco: Never   Vaping Use    Vaping status: Never Used   Substance Use Topics    Alcohol use: Never    Drug use: Never       Allergies:  No Known Allergies    Assessment/Plan  230 g prostate, recurrent bleeding, retention complicated by frequent catheter issues and frequent ER visits.  Has had surgeries related to hematuria.  They are miserable with a catheter.  Would like to do anything to get catheter free.    We had a long and thorough discussion regarding the natural history and options for treatment for bothersome prostatic enlargement.  We discussed that observation is an option for minimally symptomatic BPH and the role of medical therapy, but surgical management is recommended for bothersome symptoms despite appropriate medical therapy, when a patient desires to avoid medications, severe or recurrent urinary tract infections, recurrent hematuria attributed to prostatic bleeding, urinary retention, or concern for upper tract damage caused by high pressure voiding and/or incomplete bladder emptying.     We discussed minimally invasive surgical therapies (MIST) including Rezum and UroLift along with their specific indications, risks, and benefits.  Given the size of his prostate, these would not be appropriate for him.  We discussed surgical options including  transurethral resection of prostate (TURP), greenlight PVP, HoLEP, open simple prostatectomy, and robotic simple prostatectomy.  Again, given the size of his gland he would benefit from an enucleative approach.  We discussed the relative merits of robotic simple prostatectomy, open simple prostatectomy, and holmium laser enucleation of the prostate.  In particular, given the minimally invasive approach with associated short duration of catheter, low complication rate, and possibility for an outpatient or overnight stay, I recommended he consider a HoLEP.     We had a long discussion about holmium laser enucleation of the prostate.  I explained how the procedure is done and trang diagrams.  I discussed the perioperative pathway, likely 1 night with a catheter and either outpatient or overnight observation in the hospital.  Discussed risks including acute and delayed bleeding, infection, risk of incontinence, risk of anejaculation.  In particular, regarding the risk of temporary incontinence we discussed the literature that reports approximately a third of men at 3 months will have some degree of bothersome leakage, but that number drops to less than 1% at 1 year.  Discussed the low probability of blood transfusion.  I discussed that following the procedure he would not be able to ejaculate, but would still obtain erection and orgasm at his current sexual function. We discussed the possibility of repeat operation, though uncommon with HoLEP.     I discussed that I do not expect him to have much urinary control after surgery given his Parkinson's disease.  He will likely be incontinent for the rest of his life.  However, he would likely be catheter free.  They would like to proceed with this understanding risks of permanent incontinence as well as the risk that he may not do well postoperatively given his fragile medical state.  We had a long and thorough discussion about other options of PAE and SP tube.  However,  cannot guarantee that this would render him catheter free, and they would like to do open procedure that would render him catheter free, or at least give him the best chance of this.    Will likely need to spend a night or two to convalesce as he recovers from surgery, may need to go to rehab, will assess postop.     Scribe Attestation  By signing my name below, I, Mark May, attest that this documentation  has been prepared under the direction and in the presence of Good Huffman MD.

## 2024-08-08 ENCOUNTER — HOME CARE VISIT (OUTPATIENT)
Dept: HOME HEALTH SERVICES | Facility: HOME HEALTH | Age: 79
End: 2024-08-08
Payer: MEDICARE

## 2024-08-08 VITALS
HEART RATE: 89 BPM | OXYGEN SATURATION: 99 % | DIASTOLIC BLOOD PRESSURE: 72 MMHG | RESPIRATION RATE: 14 BRPM | TEMPERATURE: 97.5 F | SYSTOLIC BLOOD PRESSURE: 122 MMHG

## 2024-08-08 PROCEDURE — G0151 HHCP-SERV OF PT,EA 15 MIN: HCPCS | Mod: HHH

## 2024-08-08 SDOH — HEALTH STABILITY: PHYSICAL HEALTH: PHYSICAL EXERCISE: STANDING

## 2024-08-08 SDOH — HEALTH STABILITY: PHYSICAL HEALTH: PHYSICAL EXERCISE: 15

## 2024-08-08 SDOH — HEALTH STABILITY: PHYSICAL HEALTH: EXERCISE TYPE: LE EXERCISES

## 2024-08-08 SDOH — HEALTH STABILITY: PHYSICAL HEALTH: EXERCISE ACTIVITY: HS CURLS

## 2024-08-08 SDOH — HEALTH STABILITY: PHYSICAL HEALTH: EXERCISE ACTIVITIES SETS: 1

## 2024-08-08 SDOH — HEALTH STABILITY: PHYSICAL HEALTH: EXERCISE ACTIVITY: HIP EXTENSION

## 2024-08-08 SDOH — HEALTH STABILITY: PHYSICAL HEALTH: EXERCISE ACTIVITY: MINI SQUATS

## 2024-08-08 SDOH — HEALTH STABILITY: PHYSICAL HEALTH: EXERCISE ACTIVITY: HEEL RAISES/TOE RAISES

## 2024-08-08 SDOH — HEALTH STABILITY: PHYSICAL HEALTH: EXERCISE ACTIVITY: HIP ABDUCTION

## 2024-08-08 SDOH — HEALTH STABILITY: PHYSICAL HEALTH: EXERCISE ACTIVITY: MARCHING

## 2024-08-08 ASSESSMENT — ENCOUNTER SYMPTOMS
PERSON REPORTING PAIN: PATIENT
MUSCLE WEAKNESS: 1
DENIES PAIN: 1

## 2024-08-08 ASSESSMENT — ACTIVITIES OF DAILY LIVING (ADL)
AMBULATION ASSISTANCE ON FLAT SURFACES: 1
AMBULATION ASSISTANCE: STAND BY ASSIST
CURRENT_FUNCTION: STAND BY ASSIST
AMBULATION_DISTANCE/DURATION_TOLERATED: 75 FT X 2

## 2024-08-09 ENCOUNTER — CLINICAL SUPPORT (OUTPATIENT)
Dept: PREADMISSION TESTING | Facility: HOSPITAL | Age: 79
End: 2024-08-09
Payer: MEDICARE

## 2024-08-09 DIAGNOSIS — R33.8 BENIGN PROSTATIC HYPERPLASIA WITH URINARY RETENTION: ICD-10-CM

## 2024-08-09 DIAGNOSIS — N40.1 BENIGN PROSTATIC HYPERPLASIA WITH URINARY RETENTION: ICD-10-CM

## 2024-08-12 ENCOUNTER — PRE-ADMISSION TESTING (OUTPATIENT)
Dept: PREADMISSION TESTING | Facility: HOSPITAL | Age: 79
End: 2024-08-12
Payer: MEDICARE

## 2024-08-12 ENCOUNTER — DOCUMENTATION (OUTPATIENT)
Dept: PREADMISSION TESTING | Facility: HOSPITAL | Age: 79
End: 2024-08-12

## 2024-08-12 ENCOUNTER — LAB (OUTPATIENT)
Dept: LAB | Facility: LAB | Age: 79
End: 2024-08-12
Payer: MEDICARE

## 2024-08-12 VITALS
BODY MASS INDEX: 25.64 KG/M2 | WEIGHT: 153.88 LBS | TEMPERATURE: 97.7 F | SYSTOLIC BLOOD PRESSURE: 117 MMHG | DIASTOLIC BLOOD PRESSURE: 67 MMHG | HEIGHT: 65 IN

## 2024-08-12 DIAGNOSIS — Z01.818 PREOP EXAMINATION: Primary | ICD-10-CM

## 2024-08-12 DIAGNOSIS — R39.9 GENITOURINARY SYMPTOMS: ICD-10-CM

## 2024-08-12 DIAGNOSIS — N40.1 BENIGN PROSTATIC HYPERPLASIA WITH URINARY RETENTION: ICD-10-CM

## 2024-08-12 DIAGNOSIS — Z01.818 PREOP EXAMINATION: ICD-10-CM

## 2024-08-12 DIAGNOSIS — R33.8 BENIGN PROSTATIC HYPERPLASIA WITH URINARY RETENTION: ICD-10-CM

## 2024-08-12 LAB
ABO GROUP (TYPE) IN BLOOD: NORMAL
ANION GAP SERPL CALC-SCNC: 11 MMOL/L (ref 10–20)
ANTIBODY SCREEN: NORMAL
BASOPHILS # BLD AUTO: 0.11 X10*3/UL (ref 0–0.1)
BASOPHILS NFR BLD AUTO: 0.8 %
BUN SERPL-MCNC: 21 MG/DL (ref 6–23)
CALCIUM SERPL-MCNC: 8.6 MG/DL (ref 8.6–10.3)
CHLORIDE SERPL-SCNC: 104 MMOL/L (ref 98–107)
CO2 SERPL-SCNC: 26 MMOL/L (ref 21–32)
CREAT SERPL-MCNC: 0.94 MG/DL (ref 0.5–1.3)
EGFRCR SERPLBLD CKD-EPI 2021: 81 ML/MIN/1.73M*2
EOSINOPHIL # BLD AUTO: 0.33 X10*3/UL (ref 0–0.4)
EOSINOPHIL NFR BLD AUTO: 2.5 %
ERYTHROCYTE [DISTWIDTH] IN BLOOD BY AUTOMATED COUNT: 14.7 % (ref 11.5–14.5)
GLUCOSE SERPL-MCNC: 109 MG/DL (ref 74–99)
HCT VFR BLD AUTO: 39.1 % (ref 41–52)
HGB BLD-MCNC: 12.8 G/DL (ref 13.5–17.5)
IMM GRANULOCYTES # BLD AUTO: 0.08 X10*3/UL (ref 0–0.5)
IMM GRANULOCYTES NFR BLD AUTO: 0.6 % (ref 0–0.9)
LYMPHOCYTES # BLD AUTO: 0.86 X10*3/UL (ref 0.8–3)
LYMPHOCYTES NFR BLD AUTO: 6.4 %
MCH RBC QN AUTO: 33.2 PG (ref 26–34)
MCHC RBC AUTO-ENTMCNC: 32.7 G/DL (ref 32–36)
MCV RBC AUTO: 101 FL (ref 80–100)
MONOCYTES # BLD AUTO: 1.02 X10*3/UL (ref 0.05–0.8)
MONOCYTES NFR BLD AUTO: 7.6 %
NEUTROPHILS # BLD AUTO: 10.96 X10*3/UL (ref 1.6–5.5)
NEUTROPHILS NFR BLD AUTO: 82.1 %
NRBC BLD-RTO: 0 /100 WBCS (ref 0–0)
PLATELET # BLD AUTO: 403 X10*3/UL (ref 150–450)
POTASSIUM SERPL-SCNC: 3.8 MMOL/L (ref 3.5–5.3)
RBC # BLD AUTO: 3.86 X10*6/UL (ref 4.5–5.9)
RH FACTOR (ANTIGEN D): NORMAL
SODIUM SERPL-SCNC: 137 MMOL/L (ref 136–145)
WBC # BLD AUTO: 13.4 X10*3/UL (ref 4.4–11.3)

## 2024-08-12 PROCEDURE — 86900 BLOOD TYPING SEROLOGIC ABO: CPT

## 2024-08-12 PROCEDURE — 86901 BLOOD TYPING SEROLOGIC RH(D): CPT

## 2024-08-12 PROCEDURE — 87186 SC STD MICRODIL/AGAR DIL: CPT

## 2024-08-12 PROCEDURE — 87086 URINE CULTURE/COLONY COUNT: CPT

## 2024-08-12 PROCEDURE — 99205 OFFICE O/P NEW HI 60 MIN: CPT | Performed by: NURSE PRACTITIONER

## 2024-08-12 PROCEDURE — 86850 RBC ANTIBODY SCREEN: CPT

## 2024-08-12 PROCEDURE — 85025 COMPLETE CBC W/AUTO DIFF WBC: CPT

## 2024-08-12 PROCEDURE — 80048 BASIC METABOLIC PNL TOTAL CA: CPT

## 2024-08-12 ASSESSMENT — ENCOUNTER SYMPTOMS
TREMORS: 1
EYES NEGATIVE: 1
RESPIRATORY NEGATIVE: 1
GASTROINTESTINAL NEGATIVE: 1
MUSCULOSKELETAL NEGATIVE: 1
ENDOCRINE NEGATIVE: 1
TROUBLE SWALLOWING: 1
NECK NEGATIVE: 1

## 2024-08-12 NOTE — CPM/PAT H&P
[facility] Evaluation       Name: Marco Antonio Merlos Jr. (Marco Antonio Merlos Jr.)  /Age: 1945/79 y.o.     In-Person           HPI        Date of Consult: 24    Referring Provider: Dr. Huffman    Surgery, Date, and Length: Holmium Laser Enucleation Transurethral Prostate, 24    Marco Antonio Merlos is a 79 year-old male who presents to the [facility] for perioperative risk assessment prior to surgery.    Patient presents with a primary diagnosis of BPH and hematuria. Referred by Dr. Faulkner for bothersome urinary symptoms and concerns for prostate cancer. Last PSA 2021 was 16.3 (32 corrected for finasteride). From a urinary standpoint he has urgency, frequency, urge incontinence. Urination is sporadic. He is overall not too bothered with his urination. He wears depends. No prior prostate biopsy. Spot bladder scan today was 67cc and he voided about 3 hrs ago. 24 -seen back in follow-up.  Miserable with the catheter.  He is having to have nurse visit several times a week in order to deal with catheter clogging and hematuria.  He has had procedures as above to deal with complications of hematuria.     This note was created in part upon personal review of patient's medical records.      Patient is scheduled to have  Holmium Laser Enucleation Transurethral Prostate      Pt denies any past history of anesthetic complications such as PONV, awareness, prolonged sedation, dental damage, aspiration, cardiac arrest, difficult intubation, difficult I.V. access or unexpected hospital admissions.  NO malignant hyperthermia and or pseudocholinesterase deficiency.  No history of blood transfusions     STOP BANG 3    PATIENT GETS HALLUCINATIONS AFTER ANESTHESIA PER SON    24 Cystoscopy with Evacuation Clots, fulguration EMERGENT SURGERY UNDER GENERAL ANESTHESIA. NO COMPLICATIONS    SON DENIES BLOOD CLOTS IN BLE OR LUNGS    The patient is not a [employer] and will accept blood and blood products if medically  indicated.   Type and screen sent.     Past Medical History:   Diagnosis Date    Neuropathy     Osteoarthritis     Parkinson disease (Multi)     RLS (restless legs syndrome)     Scoliosis     Seizure (Multi) 2020    1 episode, unknown cause    Spinal stenosis     Thromboembolism (Multi)     son denies - no history of blood clots    Urinary tract infection     admitted 7/2024 for complicated UTI and hematuria requiring cystoscopy with cauterization.       Past Surgical History:   Procedure Laterality Date    APPENDECTOMY      COLONOSCOPY      CYSTOSCOPY      with cauterization    HIP FRACTURE SURGERY  2022    SPINE SURGERY      TOTAL KNEE ARTHROPLASTY Right 2022    TOTAL KNEE ARTHROPLASTY Left        Social History     Tobacco Use    Smoking status: Never    Smokeless tobacco: Never   Vaping Use    Vaping status: Never Used   Substance Use Topics    Alcohol use: Never    Drug use: Never        Family History   Problem Relation Name Age of Onset    Valvular heart disease Father      Other (sepsis) Sister      Bone cancer Sister         No Known Allergies      Current Outpatient Medications:     finasteride (Proscar) 5 mg tablet, Take 1 tablet (5 mg) by mouth once daily., Disp: 90 tablet, Rfl: 3    magnesium oxide (Mag-Ox) 400 mg (241.3 mg magnesium) tablet, Take 1 tablet (400 mg) by mouth once daily. (Patient taking differently: Take 1 tablet (400 mg) by mouth if needed.), Disp: 90 tablet, Rfl: 2    tamsulosin (Flomax) 0.4 mg 24 hr capsule, Take 1 capsule (0.4 mg) by mouth once daily., Disp: 90 capsule, Rfl: 3    DULoxetine (Cymbalta) 30 mg DR capsule, Take 1 capsule (30 mg) by mouth once daily for 7 days, THEN 2 capsules (60 mg) once daily. Do not crush or chew.. (Patient not taking: Reported on 8/12/2024), Disp: 30 capsule, Rfl: 2    sulfamethoxazole-trimethoprim (Bactrim DS) 800-160 mg tablet, Take 1 tablet by mouth 2 times a day for 8 days. Start 5d preop, Disp: 16 tablet, Rfl: 0        PAT ROS:   Constitutional:     Uses walker at home but wheelchair when he is out and about  Neuro/Psych:    tremors  Eyes:   neg    Ears:   Nose:   neg    Mouth:   neg    Throat:    dysphagia  Neck:   neg    Cardio:    Functional 2 mets. Sedentary lifestyle. Lives by himself  Respiratory:   neg    Endocrine:   neg    GI:   neg    :    Holley intact  Musculoskeletal:   neg    Hematologic:   neg    Skin:  neg        Physical Exam  Vitals reviewed. Physical exam within normal limits.  (uses hearing aides, holley intact drainage pinkish-red urine)  Neurological:      Comments: Generalized tremors          PAT AIRWAY:   Airway:     Mallampati::  II    Neck ROM::  Full   upper dentures and lower dentures    Lab Results   Component Value Date    WBC 13.4 (H) 08/12/2024    HGB 12.8 (L) 08/12/2024    HCT 39.1 (L) 08/12/2024     (H) 08/12/2024     08/12/2024        Lab Results   Component Value Date    GLUCOSE 93 07/22/2024    CALCIUM 8.4 (L) 07/22/2024     (L) 07/22/2024    K 4.1 07/22/2024    CO2 26 07/22/2024     07/22/2024    BUN 19 07/22/2024    CREATININE 0.80 07/22/2024      Encounter Date: 07/09/24   ECG 12 Lead   Result Value    Ventricular Rate 110    Atrial Rate 110    WY Interval 146    QRS Duration 86    QT Interval 332    QTC Calculation(Bazett) 449    P Axis 27    R Axis -25    T Axis 10    QRS Count 18    Q Onset 211    P Onset 138    P Offset 186    T Offset 377    QTC Fredericia 406    Narrative    Sinus tachycardia  Inferior infarct (cited on or before 10-DEC-2022)  Abnormal ECG  When compared with ECG of 10-DEC-2022 20:15,  No significant change was found  Confirmed by Dagoberto Justice ([id number]) on 7/11/2024 3:53:49 PM        Assessment and Plan:         Patient is a 79-year-old male scheduled for a with Dr. Huffman on 8/27/24 .  Patient has no active cardiac symptoms.   Patient denies any chest pain, tightness, heaviness, pressure, radiating pain, palpitations, irregular heartbeats, lightheadedness, cough,  congestion, shortness of breath, WALSH, PND, near syncope, weight loss or gain.     RCRI  1  , 6 % Risk of MACE    Cardiology:  -- Patient has poor functional capacity due to Parkinson's Disease. Cardiac clearance deferred due to recent surgery 7/9/24 under general anesthesia. No complications. Cardiac examination WNL and no cardiac co morbidities.     Hematology:  Patient instructed to ambulate as soon as possible postoperatively to decrease thromboembolic risk.   Initiate mechanical DVT prophylaxis as soon as possible and initiate chemical prophylaxis when deemed safe from a bleeding standpoint post surgery.   -- History of anemia: cbc ordered and T&S obtained    Caprini: 9    Renal:  - Recommendations to avoid nephrotoxic drugs and carefully monitor fluid status to maintain euvolemia. Use dose adjusted medications as needed for the underlying level of renal function.  -- urine Culture ordered    Risk assessment complete.  Patient is scheduled for a low surgical risk procedure.        Preoperative medication instructions were provided and reviewed with the patient.  Any additional testing or evaluation was explained to the patient.  Nothing by mouth instructions were discussed and patient's questions were answered prior to conclusion to this encounter.  Patient verbalized understanding of preoperative instructions given in preadmission testing; discharge instructions available in EMR.    This note was dictated by a speech recognition.  Minor errors may have been detected in a speech recognition.

## 2024-08-12 NOTE — CPM/PAT NURSE NOTE
CPM/PAT Nurse Note      Name: Marco Antonio Merlos JrNatasha (Marco Antonio Merlos JrNatasha)  /Age: 1945/79 y.o.       Past Medical History:   Diagnosis Date    Neuropathy     Osteoarthritis     Parkinson disease (Multi)     RLS (restless legs syndrome)     Scoliosis     Seizure (Multi)     1 episode, unknown cause    Spinal stenosis     Thromboembolism (Multi)     son denies - no history of blood clots    Urinary tract infection     admitted 2024 for complicated UTI and hematuria requiring cystoscopy with cauterization.       Past Surgical History:   Procedure Laterality Date    APPENDECTOMY      COLONOSCOPY      CYSTOSCOPY      with cauterization    HIP FRACTURE SURGERY      SPINE SURGERY      TOTAL KNEE ARTHROPLASTY Right     TOTAL KNEE ARTHROPLASTY Left        Patient Sexual activity questions deferred to the physician.    Family History   Problem Relation Name Age of Onset    Valvular heart disease Father      Other (sepsis) Sister      Bone cancer Sister         No Known Allergies    Prior to Admission medications    Medication Sig Start Date End Date Taking? Authorizing Provider   DULoxetine (Cymbalta) 30 mg DR capsule Take 1 capsule (30 mg) by mouth once daily for 7 days, THEN 2 capsules (60 mg) once daily. Do not crush or chew..  Patient not taking: Reported on 23  Taylor Sol MD   finasteride (Proscar) 5 mg tablet Take 1 tablet (5 mg) by mouth once daily. 23   Santhosh Loya DO   magnesium oxide (Mag-Ox) 400 mg (241.3 mg magnesium) tablet Take 1 tablet (400 mg) by mouth once daily.  Patient taking differently: Take 1 tablet (400 mg) by mouth if needed. 24  Santhosh Loya DO   sulfamethoxazole-trimethoprim (Bactrim DS) 800-160 mg tablet Take 1 tablet by mouth 2 times a day for 8 days. Start 5d preop 8/7/24 8/15/24  Good Huffman MD   tamsulosin (Flomax) 0.4 mg 24 hr capsule Take 1 capsule (0.4 mg) by mouth once daily. 23   Santhosh Loya DO    melatonin 3 mg tablet Take 1 tablet (3 mg) by mouth once daily at bedtime.  8/9/24  Historical Provider, MD ANDREW PATTERSON     DASBARB Risk Score    No data to display       Caprini DVT Assessment    No data to display       Modified Frailty Index    No data to display       CHADS2 Stroke Risk         N/A 3 to 100%: High Risk   2 to < 3%: Medium Risk   0 to < 2%: Low Risk     Last Change: N/A          This score determines the patient's risk of having a stroke if the patient has atrial fibrillation.        This score is not applicable to this patient. Components are not calculated.          Revised Cardiac Risk Index    No data to display       Apfel Simplified Score    No data to display       Risk Analysis Index Results This Encounter    No data found in the last 1 encounters.       After Visit Summary (AVS) reviewed and patient verbalized good understanding of medications and NPO instructions.     Nurse Plan of Action:

## 2024-08-12 NOTE — H&P (VIEW-ONLY)
[facility] Evaluation       Name: Mraco Antonio Merlos Jr. (Marco Antonio Merlos Jr.)  /Age: 1945/79 y.o.     In-Person           HPI        Date of Consult: 24    Referring Provider: Dr. Huffman    Surgery, Date, and Length: Holmium Laser Enucleation Transurethral Prostate, 24    Marco Antonio Merlos is a 79 year-old male who presents to the [facility] for perioperative risk assessment prior to surgery.    Patient presents with a primary diagnosis of BPH and hematuria. Referred by Dr. Faulkner for bothersome urinary symptoms and concerns for prostate cancer. Last PSA 2021 was 16.3 (32 corrected for finasteride). From a urinary standpoint he has urgency, frequency, urge incontinence. Urination is sporadic. He is overall not too bothered with his urination. He wears depends. No prior prostate biopsy. Spot bladder scan today was 67cc and he voided about 3 hrs ago. 24 -seen back in follow-up.  Miserable with the catheter.  He is having to have nurse visit several times a week in order to deal with catheter clogging and hematuria.  He has had procedures as above to deal with complications of hematuria.     This note was created in part upon personal review of patient's medical records.      Patient is scheduled to have  Holmium Laser Enucleation Transurethral Prostate      Pt denies any past history of anesthetic complications such as PONV, awareness, prolonged sedation, dental damage, aspiration, cardiac arrest, difficult intubation, difficult I.V. access or unexpected hospital admissions.  NO malignant hyperthermia and or pseudocholinesterase deficiency.  No history of blood transfusions     STOP BANG 3    PATIENT GETS HALLUCINATIONS AFTER ANESTHESIA PER SON    24 Cystoscopy with Evacuation Clots, fulguration EMERGENT SURGERY UNDER GENERAL ANESTHESIA. NO COMPLICATIONS    SON DENIES BLOOD CLOTS IN BLE OR LUNGS    The patient is not a [employer] and will accept blood and blood products if medically  indicated.   Type and screen sent.     Past Medical History:   Diagnosis Date    Neuropathy     Osteoarthritis     Parkinson disease (Multi)     RLS (restless legs syndrome)     Scoliosis     Seizure (Multi) 2020    1 episode, unknown cause    Spinal stenosis     Thromboembolism (Multi)     son denies - no history of blood clots    Urinary tract infection     admitted 7/2024 for complicated UTI and hematuria requiring cystoscopy with cauterization.       Past Surgical History:   Procedure Laterality Date    APPENDECTOMY      COLONOSCOPY      CYSTOSCOPY      with cauterization    HIP FRACTURE SURGERY  2022    SPINE SURGERY      TOTAL KNEE ARTHROPLASTY Right 2022    TOTAL KNEE ARTHROPLASTY Left        Social History     Tobacco Use    Smoking status: Never    Smokeless tobacco: Never   Vaping Use    Vaping status: Never Used   Substance Use Topics    Alcohol use: Never    Drug use: Never        Family History   Problem Relation Name Age of Onset    Valvular heart disease Father      Other (sepsis) Sister      Bone cancer Sister         No Known Allergies      Current Outpatient Medications:     finasteride (Proscar) 5 mg tablet, Take 1 tablet (5 mg) by mouth once daily., Disp: 90 tablet, Rfl: 3    magnesium oxide (Mag-Ox) 400 mg (241.3 mg magnesium) tablet, Take 1 tablet (400 mg) by mouth once daily. (Patient taking differently: Take 1 tablet (400 mg) by mouth if needed.), Disp: 90 tablet, Rfl: 2    tamsulosin (Flomax) 0.4 mg 24 hr capsule, Take 1 capsule (0.4 mg) by mouth once daily., Disp: 90 capsule, Rfl: 3    DULoxetine (Cymbalta) 30 mg DR capsule, Take 1 capsule (30 mg) by mouth once daily for 7 days, THEN 2 capsules (60 mg) once daily. Do not crush or chew.. (Patient not taking: Reported on 8/12/2024), Disp: 30 capsule, Rfl: 2    sulfamethoxazole-trimethoprim (Bactrim DS) 800-160 mg tablet, Take 1 tablet by mouth 2 times a day for 8 days. Start 5d preop, Disp: 16 tablet, Rfl: 0        PAT ROS:   Constitutional:     Uses walker at home but wheelchair when he is out and about  Neuro/Psych:    tremors  Eyes:   neg    Ears:   Nose:   neg    Mouth:   neg    Throat:    dysphagia  Neck:   neg    Cardio:    Functional 2 mets. Sedentary lifestyle. Lives by himself  Respiratory:   neg    Endocrine:   neg    GI:   neg    :    Holley intact  Musculoskeletal:   neg    Hematologic:   neg    Skin:  neg        Physical Exam  Vitals reviewed. Physical exam within normal limits.  (uses hearing aides, holley intact drainage pinkish-red urine)  Neurological:      Comments: Generalized tremors          PAT AIRWAY:   Airway:     Mallampati::  II    Neck ROM::  Full   upper dentures and lower dentures    Lab Results   Component Value Date    WBC 13.4 (H) 08/12/2024    HGB 12.8 (L) 08/12/2024    HCT 39.1 (L) 08/12/2024     (H) 08/12/2024     08/12/2024        Lab Results   Component Value Date    GLUCOSE 93 07/22/2024    CALCIUM 8.4 (L) 07/22/2024     (L) 07/22/2024    K 4.1 07/22/2024    CO2 26 07/22/2024     07/22/2024    BUN 19 07/22/2024    CREATININE 0.80 07/22/2024      Encounter Date: 07/09/24   ECG 12 Lead   Result Value    Ventricular Rate 110    Atrial Rate 110    [place] Interval 146    QRS Duration 86    QT Interval 332    QTC Calculation(Bazett) 449    P Axis 27    R Axis -25    T Axis 10    QRS Count 18    Q Onset 211    P Onset 138    P Offset 186    T Offset 377    QTC Fredericia 406    Narrative    Sinus tachycardia  Inferior infarct (cited on or before 10-DEC-2022)  Abnormal ECG  When compared with ECG of 10-DEC-2022 20:15,  No significant change was found  Confirmed by Dagoberto Justice ([id number]) on 7/11/2024 3:53:49 PM        Assessment and Plan:         Patient is a 79-year-old male scheduled for a with Dr. Huffman on 8/27/24 .  Patient has no active cardiac symptoms.   Patient denies any chest pain, tightness, heaviness, pressure, radiating pain, palpitations, irregular heartbeats, lightheadedness, cough,  congestion, shortness of breath, WALSH, PND, near syncope, weight loss or gain.     RCRI  1  , 6 % Risk of MACE    Cardiology:  -- Patient has poor functional capacity due to Parkinson's Disease. Cardiac clearance deferred due to recent surgery 7/9/24 under general anesthesia. No complications. Cardiac examination WNL and no cardiac co morbidities.     Hematology:  Patient instructed to ambulate as soon as possible postoperatively to decrease thromboembolic risk.   Initiate mechanical DVT prophylaxis as soon as possible and initiate chemical prophylaxis when deemed safe from a bleeding standpoint post surgery.   -- History of anemia: cbc ordered and T&S obtained    Caprini: 9    Renal:  - Recommendations to avoid nephrotoxic drugs and carefully monitor fluid status to maintain euvolemia. Use dose adjusted medications as needed for the underlying level of renal function.  -- urine Culture ordered    Risk assessment complete.  Patient is scheduled for a low surgical risk procedure.        Preoperative medication instructions were provided and reviewed with the patient.  Any additional testing or evaluation was explained to the patient.  Nothing by mouth instructions were discussed and patient's questions were answered prior to conclusion to this encounter.  Patient verbalized understanding of preoperative instructions given in preadmission testing; discharge instructions available in EMR.    This note was dictated by a speech recognition.  Minor errors may have been detected in a speech recognition.

## 2024-08-12 NOTE — PREPROCEDURE INSTRUCTIONS
Medication List            Accurate as of August 12, 2024  8:37 AM. Always use your most recent med list.                DULoxetine 30 mg DR capsule  Commonly known as: Cymbalta  Take 1 capsule (30 mg) by mouth once daily for 7 days, THEN 2 capsules (60 mg) once daily. Do not crush or chew..  Start taking on: December 14, 2023  Medication Adjustments for Surgery: Take morning of surgery with sip of water, no other fluids     finasteride 5 mg tablet  Commonly known as: Proscar  Take 1 tablet (5 mg) by mouth once daily.  Medication Adjustments for Surgery: Take morning of surgery with sip of water, no other fluids     magnesium oxide 400 mg (241.3 mg magnesium) tablet  Commonly known as: Mag-Ox  Take 1 tablet (400 mg) by mouth once daily.  Medication Adjustments for Surgery: Continue until night before surgery     sulfamethoxazole-trimethoprim 800-160 mg tablet  Commonly known as: Bactrim DS  Take 1 tablet by mouth 2 times a day for 8 days. Start 5d preop  Medication Adjustments for Surgery: Take morning of surgery with sip of water, no other fluids     tamsulosin 0.4 mg 24 hr capsule  Commonly known as: Flomax  Take 1 capsule (0.4 mg) by mouth once daily.  Medication Adjustments for Surgery: Take morning of surgery with sip of water, no other fluids                        **Concerning above medication instructions, if medication is normally taken at night, continue normal schedule.**  **DO NOT TAKE NIGHT PRIOR AND MORNING OF SURGERY**    CONTACT SURGEON'S OFFICE IF YOU DEVELOP:  * Fever = 100.4 F   * New respiratory symptoms (e.g. cough, shortness of breath, respiratory distress, sore throat)  * Recent loss of taste or smell  *Flu like symptoms such as headache, fatigue or gastrointestinal symptoms  * You develop any open sores, shingles, burning or painful urination   AND/OR:  * You no longer wish to have the surgery.  * Any other personal circumstances change that may lead to the need to cancel or defer this  surgery.  *You were admitted to any hospital within one week of your planned procedure.    SMOKING:  *Quitting smoking can make a huge difference to your health and recovery from surgery.    *If you need help with quitting, call 1-040-QUIT-NOW.    THE DAY BEFORE SURGERY:  *Do not eat any food after midnight the night before surgery.   *You are permitted to drink clear liquids (i.e. water, black coffee (no milk or cream), tea, apple juice and electrolyte drinks (gatorade)) up to 13.5 ounces, up to 2 hours before your arrival time.  *You may chew gum until 2 hours before your surgery    SURGICAL TIME  *You will be contacted between 2 p.m. and 6 p.m. the business day before your surgery with your arrival time.  *If you haven't received a call by 6pm, call [phone].  *Scheduled surgery times may change and you will be notified if this occurs-check your personal voicemail for any updates.    ON THE MORNING OF SURGERY:  *Wear comfortable, loose fitting clothing.   *Do not use moisturizers, creams, lotions or perfume.  *All jewelry and valuables should be left at home.  *Prosthetic devices such as contact lenses, hearing aids, dentures, eyelash extensions, hairpins and body piercing must be removed before surgery.    BRING WITH YOU:  *Photo ID and insurance card  *Current list of medicines and allergies  *Pacemaker/Defibrillator/Heart stent cards  *CPAP machine and mask  *Slings/splints/crutches  *Copy of your complete Advanced Directive/DHPOA-if applicable  *Neurostimulator implant remote    PARKING AND ARRIVAL:  *Check in at the Main Entrance desk and let them know you are here for surgery.  *You will be directed to the 2nd floor surgical waiting area.    AFTER OUTPATIENT SURGERY:  *A responsible adult MUST accompany you at the time of discharge and stay with you for 24 hours after your surgery.  *You may NOT drive yourself home after surgery.  *You may use a taxi or ride sharing service (Lyft, Uber) to return home  ONLY if you are accompanied by a friend or family member.  *Instructions for resuming your medications will be provided by your surgeon.

## 2024-08-13 ENCOUNTER — HOME CARE VISIT (OUTPATIENT)
Dept: HOME HEALTH SERVICES | Facility: HOME HEALTH | Age: 79
End: 2024-08-13
Payer: MEDICARE

## 2024-08-13 VITALS
HEART RATE: 84 BPM | OXYGEN SATURATION: 92 % | SYSTOLIC BLOOD PRESSURE: 104 MMHG | RESPIRATION RATE: 14 BRPM | TEMPERATURE: 97.6 F | DIASTOLIC BLOOD PRESSURE: 62 MMHG

## 2024-08-13 PROCEDURE — G0151 HHCP-SERV OF PT,EA 15 MIN: HCPCS | Mod: HHH

## 2024-08-13 SDOH — HEALTH STABILITY: PHYSICAL HEALTH: EXERCISE ACTIVITY: HIP ABDUCTION

## 2024-08-13 SDOH — HEALTH STABILITY: PHYSICAL HEALTH: PHYSICAL EXERCISE: STANDING

## 2024-08-13 SDOH — HEALTH STABILITY: PHYSICAL HEALTH: EXERCISE ACTIVITY: MINI SQUATS

## 2024-08-13 SDOH — HEALTH STABILITY: PHYSICAL HEALTH: EXERCISE ACTIVITIES SETS: 1

## 2024-08-13 SDOH — HEALTH STABILITY: PHYSICAL HEALTH: PHYSICAL EXERCISE: 15

## 2024-08-13 SDOH — HEALTH STABILITY: PHYSICAL HEALTH: EXERCISE ACTIVITY: MARCHING

## 2024-08-13 SDOH — HEALTH STABILITY: PHYSICAL HEALTH: EXERCISE ACTIVITY: HEEL RAISES/TOE RAISES

## 2024-08-13 SDOH — HEALTH STABILITY: PHYSICAL HEALTH: EXERCISE ACTIVITY: HIP EXTENSION

## 2024-08-13 SDOH — HEALTH STABILITY: PHYSICAL HEALTH: EXERCISE TYPE: LE EXERCISES

## 2024-08-13 SDOH — HEALTH STABILITY: PHYSICAL HEALTH: EXERCISE ACTIVITY: HS CURLS

## 2024-08-13 ASSESSMENT — ACTIVITIES OF DAILY LIVING (ADL)
AMBULATION ASSISTANCE ON FLAT SURFACES: 1
AMBULATION_DISTANCE/DURATION_TOLERATED: 100 FT X 2
CURRENT_FUNCTION: STAND BY ASSIST
AMBULATION ASSISTANCE: STAND BY ASSIST

## 2024-08-13 ASSESSMENT — ENCOUNTER SYMPTOMS
DENIES PAIN: 1
MUSCLE WEAKNESS: 1
PERSON REPORTING PAIN: PATIENT

## 2024-08-15 ENCOUNTER — HOME CARE VISIT (OUTPATIENT)
Dept: HOME HEALTH SERVICES | Facility: HOME HEALTH | Age: 79
End: 2024-08-15
Payer: MEDICARE

## 2024-08-15 VITALS
HEART RATE: 71 BPM | TEMPERATURE: 97.9 F | OXYGEN SATURATION: 98 % | DIASTOLIC BLOOD PRESSURE: 66 MMHG | SYSTOLIC BLOOD PRESSURE: 120 MMHG | RESPIRATION RATE: 14 BRPM

## 2024-08-15 LAB
BACTERIA UR CULT: ABNORMAL
BACTERIA UR CULT: ABNORMAL

## 2024-08-15 PROCEDURE — G0151 HHCP-SERV OF PT,EA 15 MIN: HCPCS | Mod: HHH

## 2024-08-15 SDOH — HEALTH STABILITY: PHYSICAL HEALTH: PHYSICAL EXERCISE: 15

## 2024-08-15 SDOH — HEALTH STABILITY: PHYSICAL HEALTH: PHYSICAL EXERCISE: STANDING

## 2024-08-15 SDOH — HEALTH STABILITY: PHYSICAL HEALTH: EXERCISE ACTIVITIES SETS: 1

## 2024-08-15 SDOH — HEALTH STABILITY: PHYSICAL HEALTH: EXERCISE ACTIVITY: HEEL RAISES/TOE RAISES

## 2024-08-15 SDOH — HEALTH STABILITY: PHYSICAL HEALTH: EXERCISE TYPE: LE EXERCISES

## 2024-08-15 SDOH — HEALTH STABILITY: PHYSICAL HEALTH: EXERCISE ACTIVITY: MINI SQUATS

## 2024-08-15 SDOH — HEALTH STABILITY: PHYSICAL HEALTH: EXERCISE ACTIVITY: HS CURLS

## 2024-08-15 SDOH — HEALTH STABILITY: PHYSICAL HEALTH: EXERCISE ACTIVITY: HIP ABDUCTION

## 2024-08-15 SDOH — HEALTH STABILITY: PHYSICAL HEALTH: EXERCISE ACTIVITY: HIP EXTENSION

## 2024-08-15 SDOH — HEALTH STABILITY: PHYSICAL HEALTH: EXERCISE ACTIVITY: MARCHING

## 2024-08-15 ASSESSMENT — ACTIVITIES OF DAILY LIVING (ADL)
AMBULATION ASSISTANCE ON FLAT SURFACES: 1
AMBULATION_DISTANCE/DURATION_TOLERATED: 100 FT X 2
AMBULATION ASSISTANCE: STAND BY ASSIST

## 2024-08-15 ASSESSMENT — ENCOUNTER SYMPTOMS
PERSON REPORTING PAIN: PATIENT
MUSCLE WEAKNESS: 1
DENIES PAIN: 1

## 2024-08-20 ENCOUNTER — HOME CARE VISIT (OUTPATIENT)
Dept: HOME HEALTH SERVICES | Facility: HOME HEALTH | Age: 79
End: 2024-08-20
Payer: MEDICARE

## 2024-08-20 VITALS
SYSTOLIC BLOOD PRESSURE: 120 MMHG | TEMPERATURE: 97.4 F | RESPIRATION RATE: 14 BRPM | OXYGEN SATURATION: 99 % | HEART RATE: 90 BPM | DIASTOLIC BLOOD PRESSURE: 72 MMHG

## 2024-08-20 PROCEDURE — G0151 HHCP-SERV OF PT,EA 15 MIN: HCPCS | Mod: HHH

## 2024-08-20 SDOH — HEALTH STABILITY: PHYSICAL HEALTH: PHYSICAL EXERCISE: 15

## 2024-08-20 SDOH — HEALTH STABILITY: PHYSICAL HEALTH: EXERCISE ACTIVITY: HIP EXTENSION

## 2024-08-20 SDOH — HEALTH STABILITY: PHYSICAL HEALTH: PHYSICAL EXERCISE: STANDING

## 2024-08-20 SDOH — HEALTH STABILITY: PHYSICAL HEALTH: EXERCISE ACTIVITY: MARCHING

## 2024-08-20 SDOH — HEALTH STABILITY: PHYSICAL HEALTH: EXERCISE ACTIVITY: MINI SQUATS

## 2024-08-20 SDOH — HEALTH STABILITY: PHYSICAL HEALTH: EXERCISE ACTIVITIES SETS: 1

## 2024-08-20 SDOH — HEALTH STABILITY: PHYSICAL HEALTH: EXERCISE ACTIVITY: HEEL RAISES/TOE RAISES

## 2024-08-20 SDOH — HEALTH STABILITY: PHYSICAL HEALTH: EXERCISE TYPE: LE EXERCISES

## 2024-08-20 SDOH — HEALTH STABILITY: PHYSICAL HEALTH: EXERCISE ACTIVITY: HS CURLS

## 2024-08-20 SDOH — HEALTH STABILITY: PHYSICAL HEALTH: EXERCISE ACTIVITY: HIP ABDUCTION

## 2024-08-20 ASSESSMENT — ENCOUNTER SYMPTOMS
DENIES PAIN: 1
MUSCLE WEAKNESS: 1

## 2024-08-20 ASSESSMENT — ACTIVITIES OF DAILY LIVING (ADL)
AMBULATION_DISTANCE/DURATION_TOLERATED: 100 FT X 2
AMBULATION ASSISTANCE: STAND BY ASSIST
AMBULATION ASSISTANCE ON FLAT SURFACES: 1

## 2024-08-21 ENCOUNTER — HOME CARE VISIT (OUTPATIENT)
Dept: HOME HEALTH SERVICES | Facility: HOME HEALTH | Age: 79
End: 2024-08-21
Payer: MEDICARE

## 2024-08-21 DIAGNOSIS — N40.1 BENIGN PROSTATIC HYPERPLASIA WITH URINARY RETENTION: ICD-10-CM

## 2024-08-21 DIAGNOSIS — R33.8 BENIGN PROSTATIC HYPERPLASIA WITH URINARY RETENTION: ICD-10-CM

## 2024-08-21 PROCEDURE — G0299 HHS/HOSPICE OF RN EA 15 MIN: HCPCS | Mod: HHH

## 2024-08-21 RX ORDER — CIPROFLOXACIN 500 MG/1
500 TABLET ORAL 2 TIMES DAILY
Qty: 16 TABLET | Refills: 0 | Status: SHIPPED | OUTPATIENT
Start: 2024-08-21 | End: 2024-08-29

## 2024-08-21 ASSESSMENT — ENCOUNTER SYMPTOMS
MUSCLE WEAKNESS: 1
LIMITED RANGE OF MOTION: 1
DENIES PAIN: 1
APPETITE LEVEL: FAIR
HEMATURIA: 1
CHANGE IN APPETITE: UNCHANGED
PERSON REPORTING PAIN: PATIENT

## 2024-08-22 ENCOUNTER — HOME CARE VISIT (OUTPATIENT)
Dept: HOME HEALTH SERVICES | Facility: HOME HEALTH | Age: 79
End: 2024-08-22
Payer: MEDICARE

## 2024-08-22 VITALS
DIASTOLIC BLOOD PRESSURE: 78 MMHG | SYSTOLIC BLOOD PRESSURE: 120 MMHG | TEMPERATURE: 97.7 F | OXYGEN SATURATION: 99 % | RESPIRATION RATE: 14 BRPM | HEART RATE: 69 BPM

## 2024-08-22 PROCEDURE — G0151 HHCP-SERV OF PT,EA 15 MIN: HCPCS | Mod: HHH

## 2024-08-22 SDOH — HEALTH STABILITY: PHYSICAL HEALTH: PHYSICAL EXERCISE: 15

## 2024-08-22 SDOH — HEALTH STABILITY: PHYSICAL HEALTH: EXERCISE ACTIVITIES SETS: 1

## 2024-08-22 SDOH — HEALTH STABILITY: PHYSICAL HEALTH: PHYSICAL EXERCISE: STANDING

## 2024-08-22 SDOH — HEALTH STABILITY: PHYSICAL HEALTH: EXERCISE ACTIVITY: HIP EXTENSION

## 2024-08-22 SDOH — HEALTH STABILITY: PHYSICAL HEALTH: EXERCISE ACTIVITY: HEEL RAISES/TOE RAISES

## 2024-08-22 SDOH — HEALTH STABILITY: PHYSICAL HEALTH: EXERCISE ACTIVITY: HS CURLS

## 2024-08-22 SDOH — HEALTH STABILITY: PHYSICAL HEALTH: EXERCISE ACTIVITY: MARCHING

## 2024-08-22 SDOH — HEALTH STABILITY: PHYSICAL HEALTH: EXERCISE TYPE: LE EXERCISES

## 2024-08-22 SDOH — HEALTH STABILITY: PHYSICAL HEALTH: EXERCISE ACTIVITY: MINI SQUATS

## 2024-08-22 SDOH — HEALTH STABILITY: PHYSICAL HEALTH: EXERCISE ACTIVITY: HIP ABDUCTION

## 2024-08-22 ASSESSMENT — ENCOUNTER SYMPTOMS
PERSON REPORTING PAIN: PATIENT
MUSCLE WEAKNESS: 1
DENIES PAIN: 1

## 2024-08-22 ASSESSMENT — ACTIVITIES OF DAILY LIVING (ADL)
AMBULATION_DISTANCE/DURATION_TOLERATED: 100 FT X 2
AMBULATION ASSISTANCE ON FLAT SURFACES: 1
AMBULATION ASSISTANCE: STAND BY ASSIST

## 2024-08-26 ENCOUNTER — HOME CARE VISIT (OUTPATIENT)
Dept: HOME HEALTH SERVICES | Facility: HOME HEALTH | Age: 79
End: 2024-08-26
Payer: MEDICARE

## 2024-08-26 VITALS
DIASTOLIC BLOOD PRESSURE: 74 MMHG | TEMPERATURE: 98.1 F | HEART RATE: 68 BPM | SYSTOLIC BLOOD PRESSURE: 124 MMHG | OXYGEN SATURATION: 99 % | RESPIRATION RATE: 14 BRPM

## 2024-08-26 PROCEDURE — G0151 HHCP-SERV OF PT,EA 15 MIN: HCPCS | Mod: HHH

## 2024-08-26 SDOH — HEALTH STABILITY: PHYSICAL HEALTH: EXERCISE ACTIVITIES SETS: 1

## 2024-08-26 SDOH — HEALTH STABILITY: PHYSICAL HEALTH: PHYSICAL EXERCISE: STANDING

## 2024-08-26 SDOH — HEALTH STABILITY: PHYSICAL HEALTH: EXERCISE ACTIVITY: HIP EXTENSION

## 2024-08-26 SDOH — HEALTH STABILITY: PHYSICAL HEALTH: EXERCISE ACTIVITY: MARCHING

## 2024-08-26 SDOH — HEALTH STABILITY: PHYSICAL HEALTH: EXERCISE TYPE: LE EXERCISES

## 2024-08-26 SDOH — HEALTH STABILITY: PHYSICAL HEALTH: PHYSICAL EXERCISE: 15

## 2024-08-26 SDOH — HEALTH STABILITY: PHYSICAL HEALTH: EXERCISE ACTIVITY: HEEL RAISES/TOE RAISES

## 2024-08-26 SDOH — HEALTH STABILITY: PHYSICAL HEALTH: EXERCISE ACTIVITY: MINI SQUATS

## 2024-08-26 SDOH — HEALTH STABILITY: PHYSICAL HEALTH: EXERCISE ACTIVITY: HS CURLS

## 2024-08-26 SDOH — HEALTH STABILITY: PHYSICAL HEALTH: EXERCISE ACTIVITY: HIP ABDUCTION

## 2024-08-26 ASSESSMENT — ENCOUNTER SYMPTOMS
MUSCLE WEAKNESS: 1
DENIES PAIN: 1
PERSON REPORTING PAIN: PATIENT

## 2024-08-26 ASSESSMENT — ACTIVITIES OF DAILY LIVING (ADL)
AMBULATION ASSISTANCE ON FLAT SURFACES: 1
AMBULATION ASSISTANCE: STAND BY ASSIST
AMBULATION_DISTANCE/DURATION_TOLERATED: 100 FT X 2

## 2024-08-27 ENCOUNTER — ANESTHESIA (OUTPATIENT)
Dept: OPERATING ROOM | Facility: HOSPITAL | Age: 79
End: 2024-08-27
Payer: MEDICARE

## 2024-08-27 ENCOUNTER — ANESTHESIA EVENT (OUTPATIENT)
Dept: OPERATING ROOM | Facility: HOSPITAL | Age: 79
End: 2024-08-27
Payer: MEDICARE

## 2024-08-27 ENCOUNTER — HOSPITAL ENCOUNTER (OUTPATIENT)
Facility: HOSPITAL | Age: 79
Discharge: HOME | End: 2024-08-28
Attending: UROLOGY | Admitting: UROLOGY
Payer: MEDICARE

## 2024-08-27 ENCOUNTER — HOME CARE VISIT (OUTPATIENT)
Dept: HOME HEALTH SERVICES | Facility: HOME HEALTH | Age: 79
End: 2024-08-27
Payer: MEDICARE

## 2024-08-27 DIAGNOSIS — N40.1 BENIGN PROSTATIC HYPERPLASIA WITH URINARY RETENTION: ICD-10-CM

## 2024-08-27 DIAGNOSIS — Z98.890: Primary | ICD-10-CM

## 2024-08-27 DIAGNOSIS — R33.8 BENIGN PROSTATIC HYPERPLASIA WITH URINARY RETENTION: ICD-10-CM

## 2024-08-27 PROBLEM — N13.8 BPH WITH URINARY OBSTRUCTION: Status: ACTIVE | Noted: 2024-08-27

## 2024-08-27 LAB
ABO GROUP (TYPE) IN BLOOD: NORMAL
ALBUMIN SERPL BCP-MCNC: 3.1 G/DL (ref 3.4–5)
ALP SERPL-CCNC: 74 U/L (ref 33–136)
ALT SERPL W P-5'-P-CCNC: 7 U/L (ref 10–52)
ANION GAP SERPL CALC-SCNC: 12 MMOL/L (ref 10–20)
AST SERPL W P-5'-P-CCNC: 12 U/L (ref 9–39)
BILIRUB SERPL-MCNC: 0.4 MG/DL (ref 0–1.2)
BUN SERPL-MCNC: 17 MG/DL (ref 6–23)
CALCIUM SERPL-MCNC: 8.1 MG/DL (ref 8.6–10.3)
CHLORIDE SERPL-SCNC: 103 MMOL/L (ref 98–107)
CO2 SERPL-SCNC: 24 MMOL/L (ref 21–32)
CREAT SERPL-MCNC: 0.93 MG/DL (ref 0.5–1.3)
EGFRCR SERPLBLD CKD-EPI 2021: 82 ML/MIN/1.73M*2
ERYTHROCYTE [DISTWIDTH] IN BLOOD BY AUTOMATED COUNT: 14.6 % (ref 11.5–14.5)
GLUCOSE SERPL-MCNC: 191 MG/DL (ref 74–99)
HCT VFR BLD AUTO: 36 % (ref 41–52)
HGB BLD-MCNC: 12 G/DL (ref 13.5–17.5)
MCH RBC QN AUTO: 33.8 PG (ref 26–34)
MCHC RBC AUTO-ENTMCNC: 33.3 G/DL (ref 32–36)
MCV RBC AUTO: 101 FL (ref 80–100)
NRBC BLD-RTO: 0 /100 WBCS (ref 0–0)
PLATELET # BLD AUTO: 360 X10*3/UL (ref 150–450)
POTASSIUM SERPL-SCNC: 3.9 MMOL/L (ref 3.5–5.3)
PROT SERPL-MCNC: 5.6 G/DL (ref 6.4–8.2)
RBC # BLD AUTO: 3.55 X10*6/UL (ref 4.5–5.9)
RH FACTOR (ANTIGEN D): NORMAL
SODIUM SERPL-SCNC: 135 MMOL/L (ref 136–145)
WBC # BLD AUTO: 9.2 X10*3/UL (ref 4.4–11.3)

## 2024-08-27 PROCEDURE — 80053 COMPREHEN METABOLIC PANEL: CPT | Performed by: NURSE PRACTITIONER

## 2024-08-27 PROCEDURE — 3600000004 HC OR TIME - INITIAL BASE CHARGE - PROCEDURE LEVEL FOUR: Performed by: UROLOGY

## 2024-08-27 PROCEDURE — 85027 COMPLETE CBC AUTOMATED: CPT | Performed by: NURSE PRACTITIONER

## 2024-08-27 PROCEDURE — C1889 IMPLANT/INSERT DEVICE, NOC: HCPCS | Performed by: UROLOGY

## 2024-08-27 PROCEDURE — 2500000004 HC RX 250 GENERAL PHARMACY W/ HCPCS (ALT 636 FOR OP/ED): Performed by: ANESTHESIOLOGY

## 2024-08-27 PROCEDURE — 2500000005 HC RX 250 GENERAL PHARMACY W/O HCPCS: Performed by: UROLOGY

## 2024-08-27 PROCEDURE — 2500000002 HC RX 250 W HCPCS SELF ADMINISTERED DRUGS (ALT 637 FOR MEDICARE OP, ALT 636 FOR OP/ED): Performed by: NURSE PRACTITIONER

## 2024-08-27 PROCEDURE — 3700000002 HC GENERAL ANESTHESIA TIME - EACH INCREMENTAL 1 MINUTE: Performed by: UROLOGY

## 2024-08-27 PROCEDURE — 2720000007 HC OR 272 NO HCPCS: Performed by: UROLOGY

## 2024-08-27 PROCEDURE — 99221 1ST HOSP IP/OBS SF/LOW 40: CPT | Performed by: NURSE PRACTITIONER

## 2024-08-27 PROCEDURE — 7100000002 HC RECOVERY ROOM TIME - EACH INCREMENTAL 1 MINUTE: Performed by: UROLOGY

## 2024-08-27 PROCEDURE — 2500000004 HC RX 250 GENERAL PHARMACY W/ HCPCS (ALT 636 FOR OP/ED): Performed by: STUDENT IN AN ORGANIZED HEALTH CARE EDUCATION/TRAINING PROGRAM

## 2024-08-27 PROCEDURE — 3700000001 HC GENERAL ANESTHESIA TIME - INITIAL BASE CHARGE: Performed by: UROLOGY

## 2024-08-27 PROCEDURE — 2500000005 HC RX 250 GENERAL PHARMACY W/O HCPCS: Performed by: ANESTHESIOLOGY

## 2024-08-27 PROCEDURE — 2500000001 HC RX 250 WO HCPCS SELF ADMINISTERED DRUGS (ALT 637 FOR MEDICARE OP): Performed by: STUDENT IN AN ORGANIZED HEALTH CARE EDUCATION/TRAINING PROGRAM

## 2024-08-27 PROCEDURE — 3600000009 HC OR TIME - EACH INCREMENTAL 1 MINUTE - PROCEDURE LEVEL FOUR: Performed by: UROLOGY

## 2024-08-27 PROCEDURE — C1758 CATHETER, URETERAL: HCPCS | Performed by: UROLOGY

## 2024-08-27 PROCEDURE — 36415 COLL VENOUS BLD VENIPUNCTURE: CPT | Performed by: NURSE PRACTITIONER

## 2024-08-27 PROCEDURE — 7100000011 HC EXTENDED STAY RECOVERY HOURLY - NURSING UNIT

## 2024-08-27 PROCEDURE — 2500000001 HC RX 250 WO HCPCS SELF ADMINISTERED DRUGS (ALT 637 FOR MEDICARE OP): Performed by: NURSE PRACTITIONER

## 2024-08-27 PROCEDURE — 7100000001 HC RECOVERY ROOM TIME - INITIAL BASE CHARGE: Performed by: UROLOGY

## 2024-08-27 PROCEDURE — 52649 PROSTATE LASER ENUCLEATION: CPT | Performed by: UROLOGY

## 2024-08-27 RX ORDER — ACETAMINOPHEN 325 MG/1
650 TABLET ORAL EVERY 6 HOURS PRN
Status: DISCONTINUED | OUTPATIENT
Start: 2024-08-27 | End: 2024-08-28 | Stop reason: HOSPADM

## 2024-08-27 RX ORDER — PROPOFOL 10 MG/ML
INJECTION, EMULSION INTRAVENOUS AS NEEDED
Status: DISCONTINUED | OUTPATIENT
Start: 2024-08-27 | End: 2024-08-27

## 2024-08-27 RX ORDER — CEFAZOLIN SODIUM 2 G/100ML
2 INJECTION, SOLUTION INTRAVENOUS ONCE
Status: DISCONTINUED | OUTPATIENT
Start: 2024-08-27 | End: 2024-08-27 | Stop reason: HOSPADM

## 2024-08-27 RX ORDER — FINASTERIDE 5 MG/1
5 TABLET, FILM COATED ORAL DAILY
Status: DISCONTINUED | OUTPATIENT
Start: 2024-08-27 | End: 2024-08-27

## 2024-08-27 RX ORDER — GENTAMICIN 40 MG/ML
INJECTION, SOLUTION INTRAMUSCULAR; INTRAVENOUS AS NEEDED
Status: DISCONTINUED | OUTPATIENT
Start: 2024-08-27 | End: 2024-08-27

## 2024-08-27 RX ORDER — POLYETHYLENE GLYCOL 3350 17 G/17G
17 POWDER, FOR SOLUTION ORAL DAILY
Status: DISCONTINUED | OUTPATIENT
Start: 2024-08-27 | End: 2024-08-28 | Stop reason: HOSPADM

## 2024-08-27 RX ORDER — SODIUM CHLORIDE, SODIUM LACTATE, POTASSIUM CHLORIDE, CALCIUM CHLORIDE 600; 310; 30; 20 MG/100ML; MG/100ML; MG/100ML; MG/100ML
100 INJECTION, SOLUTION INTRAVENOUS CONTINUOUS
Status: DISCONTINUED | OUTPATIENT
Start: 2024-08-27 | End: 2024-08-27 | Stop reason: HOSPADM

## 2024-08-27 RX ORDER — LIDOCAINE HYDROCHLORIDE 10 MG/ML
0.1 INJECTION, SOLUTION EPIDURAL; INFILTRATION; INTRACAUDAL; PERINEURAL ONCE
Status: DISCONTINUED | OUTPATIENT
Start: 2024-08-27 | End: 2024-08-27 | Stop reason: HOSPADM

## 2024-08-27 RX ORDER — SODIUM CHLORIDE 0.9 G/100ML
IRRIGANT IRRIGATION AS NEEDED
Status: DISCONTINUED | OUTPATIENT
Start: 2024-08-27 | End: 2024-08-27 | Stop reason: HOSPADM

## 2024-08-27 RX ORDER — ONDANSETRON HYDROCHLORIDE 2 MG/ML
4 INJECTION, SOLUTION INTRAVENOUS EVERY 8 HOURS PRN
Status: DISCONTINUED | OUTPATIENT
Start: 2024-08-27 | End: 2024-08-28 | Stop reason: HOSPADM

## 2024-08-27 RX ORDER — FUROSEMIDE 10 MG/ML
INJECTION INTRAMUSCULAR; INTRAVENOUS AS NEEDED
Status: DISCONTINUED | OUTPATIENT
Start: 2024-08-27 | End: 2024-08-27

## 2024-08-27 RX ORDER — PHENYLEPHRINE HCL IN 0.9% NACL 0.4MG/10ML
SYRINGE (ML) INTRAVENOUS AS NEEDED
Status: DISCONTINUED | OUTPATIENT
Start: 2024-08-27 | End: 2024-08-27

## 2024-08-27 RX ORDER — OXYCODONE HYDROCHLORIDE 5 MG/1
5 TABLET ORAL EVERY 4 HOURS PRN
Status: DISCONTINUED | OUTPATIENT
Start: 2024-08-27 | End: 2024-08-27 | Stop reason: HOSPADM

## 2024-08-27 RX ORDER — ONDANSETRON 4 MG/1
4 TABLET, FILM COATED ORAL EVERY 8 HOURS PRN
Status: DISCONTINUED | OUTPATIENT
Start: 2024-08-27 | End: 2024-08-28 | Stop reason: HOSPADM

## 2024-08-27 RX ORDER — ACETAMINOPHEN 325 MG/1
650 TABLET ORAL EVERY 4 HOURS PRN
Status: DISCONTINUED | OUTPATIENT
Start: 2024-08-27 | End: 2024-08-27 | Stop reason: HOSPADM

## 2024-08-27 RX ORDER — SODIUM CHLORIDE, SODIUM LACTATE, POTASSIUM CHLORIDE, CALCIUM CHLORIDE 600; 310; 30; 20 MG/100ML; MG/100ML; MG/100ML; MG/100ML
INJECTION, SOLUTION INTRAVENOUS CONTINUOUS PRN
Status: DISCONTINUED | OUTPATIENT
Start: 2024-08-27 | End: 2024-08-27

## 2024-08-27 RX ORDER — LIDOCAINE HYDROCHLORIDE 20 MG/ML
INJECTION, SOLUTION INFILTRATION; PERINEURAL AS NEEDED
Status: DISCONTINUED | OUTPATIENT
Start: 2024-08-27 | End: 2024-08-27

## 2024-08-27 RX ORDER — SODIUM CHLORIDE 9 MG/ML
75 INJECTION, SOLUTION INTRAVENOUS CONTINUOUS
Status: DISCONTINUED | OUTPATIENT
Start: 2024-08-27 | End: 2024-08-28

## 2024-08-27 RX ORDER — CIPROFLOXACIN 500 MG/1
500 TABLET ORAL 2 TIMES DAILY
Status: DISCONTINUED | OUTPATIENT
Start: 2024-08-27 | End: 2024-08-28 | Stop reason: HOSPADM

## 2024-08-27 RX ORDER — TAMSULOSIN HYDROCHLORIDE 0.4 MG/1
0.4 CAPSULE ORAL DAILY
Status: DISCONTINUED | OUTPATIENT
Start: 2024-08-27 | End: 2024-08-27

## 2024-08-27 RX ORDER — FENTANYL CITRATE 50 UG/ML
INJECTION, SOLUTION INTRAMUSCULAR; INTRAVENOUS AS NEEDED
Status: DISCONTINUED | OUTPATIENT
Start: 2024-08-27 | End: 2024-08-27

## 2024-08-27 RX ORDER — ROCURONIUM BROMIDE 10 MG/ML
INJECTION, SOLUTION INTRAVENOUS AS NEEDED
Status: DISCONTINUED | OUTPATIENT
Start: 2024-08-27 | End: 2024-08-27

## 2024-08-27 RX ORDER — ONDANSETRON HYDROCHLORIDE 2 MG/ML
INJECTION, SOLUTION INTRAVENOUS AS NEEDED
Status: DISCONTINUED | OUTPATIENT
Start: 2024-08-27 | End: 2024-08-27

## 2024-08-27 RX ORDER — CEFAZOLIN 1 G/1
INJECTION, POWDER, FOR SOLUTION INTRAVENOUS AS NEEDED
Status: DISCONTINUED | OUTPATIENT
Start: 2024-08-27 | End: 2024-08-27

## 2024-08-27 SDOH — SOCIAL STABILITY: SOCIAL INSECURITY: HAVE YOU HAD THOUGHTS OF HARMING ANYONE ELSE?: NO

## 2024-08-27 SDOH — SOCIAL STABILITY: SOCIAL INSECURITY: HAS ANYONE EVER THREATENED TO HURT YOUR FAMILY OR YOUR PETS?: UNABLE TO ASSESS

## 2024-08-27 SDOH — SOCIAL STABILITY: SOCIAL NETWORK
DO YOU BELONG TO ANY CLUBS OR ORGANIZATIONS SUCH AS CHURCH GROUPS UNIONS, FRATERNAL OR ATHLETIC GROUPS, OR SCHOOL GROUPS?: PATIENT UNABLE TO ANSWER

## 2024-08-27 SDOH — ECONOMIC STABILITY: HOUSING INSECURITY: IN THE PAST 12 MONTHS, HOW MANY TIMES HAVE YOU MOVED WHERE YOU WERE LIVING?: 0

## 2024-08-27 SDOH — SOCIAL STABILITY: SOCIAL NETWORK: HOW OFTEN DO YOU GET TOGETHER WITH FRIENDS OR RELATIVES?: PATIENT UNABLE TO ANSWER

## 2024-08-27 SDOH — ECONOMIC STABILITY: HOUSING INSECURITY: AT ANY TIME IN THE PAST 12 MONTHS, WERE YOU HOMELESS OR LIVING IN A SHELTER (INCLUDING NOW)?: PATIENT UNABLE TO ANSWER

## 2024-08-27 SDOH — SOCIAL STABILITY: SOCIAL INSECURITY: DO YOU FEEL UNSAFE GOING BACK TO THE PLACE WHERE YOU ARE LIVING?: UNABLE TO ASSESS

## 2024-08-27 SDOH — SOCIAL STABILITY: SOCIAL INSECURITY
WITHIN THE LAST YEAR, HAVE YOU BEEN KICKED, HIT, SLAPPED, OR OTHERWISE PHYSICALLY HURT BY YOUR PARTNER OR EX-PARTNER?: PATIENT UNABLE TO ANSWER

## 2024-08-27 SDOH — HEALTH STABILITY: MENTAL HEALTH
STRESS IS WHEN SOMEONE FEELS TENSE, NERVOUS, ANXIOUS, OR CAN'T SLEEP AT NIGHT BECAUSE THEIR MIND IS TROUBLED. HOW STRESSED ARE YOU?: PATIENT UNABLE TO ANSWER

## 2024-08-27 SDOH — SOCIAL STABILITY: SOCIAL INSECURITY: WITHIN THE LAST YEAR, HAVE YOU BEEN AFRAID OF YOUR PARTNER OR EX-PARTNER?: PATIENT UNABLE TO ANSWER

## 2024-08-27 SDOH — ECONOMIC STABILITY: FOOD INSECURITY
WITHIN THE PAST 12 MONTHS, YOU WORRIED THAT YOUR FOOD WOULD RUN OUT BEFORE YOU GOT MONEY TO BUY MORE.: PATIENT UNABLE TO ANSWER

## 2024-08-27 SDOH — SOCIAL STABILITY: SOCIAL NETWORK: ARE YOU MARRIED, WIDOWED, DIVORCED, SEPARATED, NEVER MARRIED, OR LIVING WITH A PARTNER?: PATIENT UNABLE TO ANSWER

## 2024-08-27 SDOH — HEALTH STABILITY: PHYSICAL HEALTH
ON AVERAGE, HOW MANY DAYS PER WEEK DO YOU ENGAGE IN MODERATE TO STRENUOUS EXERCISE (LIKE A BRISK WALK)?: PATIENT UNABLE TO ANSWER

## 2024-08-27 SDOH — SOCIAL STABILITY: SOCIAL INSECURITY: HAVE YOU HAD ANY THOUGHTS OF HARMING ANYONE ELSE?: UNABLE TO ASSESS

## 2024-08-27 SDOH — SOCIAL STABILITY: SOCIAL INSECURITY: WERE YOU ABLE TO COMPLETE ALL THE BEHAVIORAL HEALTH SCREENINGS?: NO

## 2024-08-27 SDOH — SOCIAL STABILITY: SOCIAL INSECURITY: ABUSE: ADULT

## 2024-08-27 SDOH — ECONOMIC STABILITY: TRANSPORTATION INSECURITY
IN THE PAST 12 MONTHS, HAS THE LACK OF TRANSPORTATION KEPT YOU FROM MEDICAL APPOINTMENTS OR FROM GETTING MEDICATIONS?: PATIENT UNABLE TO ANSWER

## 2024-08-27 SDOH — SOCIAL STABILITY: SOCIAL INSECURITY: ARE YOU OR HAVE YOU BEEN THREATENED OR ABUSED PHYSICALLY, EMOTIONALLY, OR SEXUALLY BY ANYONE?: UNABLE TO ASSESS

## 2024-08-27 SDOH — SOCIAL STABILITY: SOCIAL NETWORK: HOW OFTEN DO YOU ATTEND CHURCH OR RELIGIOUS SERVICES?: PATIENT UNABLE TO ANSWER

## 2024-08-27 SDOH — ECONOMIC STABILITY: FOOD INSECURITY
WITHIN THE PAST 12 MONTHS, THE FOOD YOU BOUGHT JUST DIDN'T LAST AND YOU DIDN'T HAVE MONEY TO GET MORE.: PATIENT UNABLE TO ANSWER

## 2024-08-27 SDOH — SOCIAL STABILITY: SOCIAL INSECURITY: ARE THERE ANY APPARENT SIGNS OF INJURIES/BEHAVIORS THAT COULD BE RELATED TO ABUSE/NEGLECT?: UNABLE TO ASSESS

## 2024-08-27 SDOH — SOCIAL STABILITY: SOCIAL NETWORK: IN A TYPICAL WEEK, HOW MANY TIMES DO YOU TALK ON THE PHONE WITH FAMILY, FRIENDS, OR NEIGHBORS?: PATIENT UNABLE TO ANSWER

## 2024-08-27 SDOH — ECONOMIC STABILITY: TRANSPORTATION INSECURITY
IN THE PAST 12 MONTHS, HAS LACK OF TRANSPORTATION KEPT YOU FROM MEETINGS, WORK, OR FROM GETTING THINGS NEEDED FOR DAILY LIVING?: PATIENT UNABLE TO ANSWER

## 2024-08-27 SDOH — SOCIAL STABILITY: SOCIAL INSECURITY: DOES ANYONE TRY TO KEEP YOU FROM HAVING/CONTACTING OTHER FRIENDS OR DOING THINGS OUTSIDE YOUR HOME?: UNABLE TO ASSESS

## 2024-08-27 SDOH — HEALTH STABILITY: MENTAL HEALTH
HOW OFTEN DO YOU NEED TO HAVE SOMEONE HELP YOU WHEN YOU READ INSTRUCTIONS, PAMPHLETS, OR OTHER WRITTEN MATERIAL FROM YOUR DOCTOR OR PHARMACY?: PATIENT UNABLE TO RESPOND

## 2024-08-27 SDOH — HEALTH STABILITY: MENTAL HEALTH: CURRENT SMOKER: 0

## 2024-08-27 SDOH — SOCIAL STABILITY: SOCIAL NETWORK: HOW OFTEN DO YOU ATTENT MEETINGS OF THE CLUB OR ORGANIZATION YOU BELONG TO?: PATIENT UNABLE TO ANSWER

## 2024-08-27 SDOH — ECONOMIC STABILITY: INCOME INSECURITY
IN THE LAST 12 MONTHS, WAS THERE A TIME WHEN YOU WERE NOT ABLE TO PAY THE MORTGAGE OR RENT ON TIME?: PATIENT UNABLE TO ANSWER

## 2024-08-27 SDOH — ECONOMIC STABILITY: INCOME INSECURITY
IN THE PAST 12 MONTHS, HAS THE ELECTRIC, GAS, OIL, OR WATER COMPANY THREATENED TO SHUT OFF SERVICE IN YOUR HOME?: PATIENT UNABLE TO ANSWER

## 2024-08-27 SDOH — ECONOMIC STABILITY: INCOME INSECURITY
HOW HARD IS IT FOR YOU TO PAY FOR THE VERY BASICS LIKE FOOD, HOUSING, MEDICAL CARE, AND HEATING?: PATIENT UNABLE TO ANSWER

## 2024-08-27 SDOH — SOCIAL STABILITY: SOCIAL INSECURITY: DO YOU FEEL ANYONE HAS EXPLOITED OR TAKEN ADVANTAGE OF YOU FINANCIALLY OR OF YOUR PERSONAL PROPERTY?: UNABLE TO ASSESS

## 2024-08-27 SDOH — SOCIAL STABILITY: SOCIAL INSECURITY
WITHIN THE LAST YEAR, HAVE TO BEEN RAPED OR FORCED TO HAVE ANY KIND OF SEXUAL ACTIVITY BY YOUR PARTNER OR EX-PARTNER?: PATIENT UNABLE TO ANSWER

## 2024-08-27 SDOH — SOCIAL STABILITY: SOCIAL INSECURITY
WITHIN THE LAST YEAR, HAVE YOU BEEN HUMILIATED OR EMOTIONALLY ABUSED IN OTHER WAYS BY YOUR PARTNER OR EX-PARTNER?: PATIENT UNABLE TO ANSWER

## 2024-08-27 ASSESSMENT — COGNITIVE AND FUNCTIONAL STATUS - GENERAL
CLIMB 3 TO 5 STEPS WITH RAILING: A LOT
MOVING TO AND FROM BED TO CHAIR: A LOT
MOBILITY SCORE: 12
WALKING IN HOSPITAL ROOM: A LOT
MOVING TO AND FROM BED TO CHAIR: A LOT
DRESSING REGULAR LOWER BODY CLOTHING: A LOT
TURNING FROM BACK TO SIDE WHILE IN FLAT BAD: A LOT
PERSONAL GROOMING: A LOT
HELP NEEDED FOR BATHING: A LOT
TOILETING: A LOT
STANDING UP FROM CHAIR USING ARMS: A LOT
EATING MEALS: A LITTLE
MOVING TO AND FROM BED TO CHAIR: A LOT
MOVING FROM LYING ON BACK TO SITTING ON SIDE OF FLAT BED WITH BEDRAILS: A LOT
HELP NEEDED FOR BATHING: A LOT
DRESSING REGULAR UPPER BODY CLOTHING: A LOT
STANDING UP FROM CHAIR USING ARMS: A LOT
DRESSING REGULAR LOWER BODY CLOTHING: A LOT
PERSONAL GROOMING: A LOT
WALKING IN HOSPITAL ROOM: A LOT
EATING MEALS: A LITTLE
MOBILITY SCORE: 12
TOILETING: A LOT
WALKING IN HOSPITAL ROOM: A LOT
CLIMB 3 TO 5 STEPS WITH RAILING: A LOT
DAILY ACTIVITIY SCORE: 13
MOVING FROM LYING ON BACK TO SITTING ON SIDE OF FLAT BED WITH BEDRAILS: A LOT
MOVING FROM LYING ON BACK TO SITTING ON SIDE OF FLAT BED WITH BEDRAILS: A LOT
DRESSING REGULAR LOWER BODY CLOTHING: A LOT
DRESSING REGULAR UPPER BODY CLOTHING: A LOT
TURNING FROM BACK TO SIDE WHILE IN FLAT BAD: A LOT
PATIENT BASELINE BEDBOUND: UNABLE TO ASSESS AT THIS TIME
TURNING FROM BACK TO SIDE WHILE IN FLAT BAD: A LOT
PATIENT BASELINE BEDBOUND: UNABLE TO ASSESS AT THIS TIME
STANDING UP FROM CHAIR USING ARMS: A LOT
CLIMB 3 TO 5 STEPS WITH RAILING: A LOT
DAILY ACTIVITIY SCORE: 13
TOILETING: A LOT
EATING MEALS: A LITTLE
HELP NEEDED FOR BATHING: A LOT
PERSONAL GROOMING: A LOT
MOBILITY SCORE: 12
DRESSING REGULAR UPPER BODY CLOTHING: A LOT
DAILY ACTIVITIY SCORE: 13

## 2024-08-27 ASSESSMENT — ACTIVITIES OF DAILY LIVING (ADL)
TOILETING: UNABLE TO ASSESS
ADEQUATE_TO_COMPLETE_ADL: UNABLE TO ASSESS
WALKS IN HOME: UNABLE TO ASSESS
GROOMING: UNABLE TO ASSESS
BATHING: UNABLE TO ASSESS
JUDGMENT_ADEQUATE_SAFELY_COMPLETE_DAILY_ACTIVITIES: UNABLE TO ASSESS
WALKS IN HOME: UNABLE TO ASSESS
FEEDING YOURSELF: UNABLE TO ASSESS
PATIENT'S MEMORY ADEQUATE TO SAFELY COMPLETE DAILY ACTIVITIES?: UNABLE TO ASSESS
FEEDING YOURSELF: UNABLE TO ASSESS
ASSISTIVE_DEVICE: OTHER (COMMENT)
DRESSING YOURSELF: UNABLE TO ASSESS
DRESSING YOURSELF: UNABLE TO ASSESS
PATIENT'S MEMORY ADEQUATE TO SAFELY COMPLETE DAILY ACTIVITIES?: UNABLE TO ASSESS
LACK_OF_TRANSPORTATION: PATIENT UNABLE TO ANSWER
HEARING - RIGHT EAR: UNABLE TO ASSESS
HEARING - RIGHT EAR: FUNCTIONAL
ASSISTIVE_DEVICE: OTHER (COMMENT)
HEARING - LEFT EAR: UNABLE TO ASSESS
GROOMING: UNABLE TO ASSESS
JUDGMENT_ADEQUATE_SAFELY_COMPLETE_DAILY_ACTIVITIES: UNABLE TO ASSESS
BATHING: UNABLE TO ASSESS
HEARING - LEFT EAR: FUNCTIONAL
TOILETING: UNABLE TO ASSESS
ADEQUATE_TO_COMPLETE_ADL: UNABLE TO ASSESS

## 2024-08-27 ASSESSMENT — PAIN - FUNCTIONAL ASSESSMENT
PAIN_FUNCTIONAL_ASSESSMENT: UNABLE TO SELF-REPORT
PAIN_FUNCTIONAL_ASSESSMENT: VAS (VISUAL ANALOG SCALE)
PAIN_FUNCTIONAL_ASSESSMENT: UNABLE TO SELF-REPORT
PAIN_FUNCTIONAL_ASSESSMENT: VAS (VISUAL ANALOG SCALE)
PAIN_FUNCTIONAL_ASSESSMENT: VAS (VISUAL ANALOG SCALE)
PAIN_FUNCTIONAL_ASSESSMENT: 0-10

## 2024-08-27 ASSESSMENT — PATIENT HEALTH QUESTIONNAIRE - PHQ9
SUM OF ALL RESPONSES TO PHQ9 QUESTIONS 1 & 2: 0
1. LITTLE INTEREST OR PLEASURE IN DOING THINGS: NOT AT ALL
2. FEELING DOWN, DEPRESSED OR HOPELESS: NOT AT ALL

## 2024-08-27 ASSESSMENT — PAIN SCALES - GENERAL
PAINLEVEL_OUTOF10: 0 - NO PAIN
PAINLEVEL_OUTOF10: 3
PAINLEVEL_OUTOF10: 0 - NO PAIN

## 2024-08-27 ASSESSMENT — LIFESTYLE VARIABLES
AUDIT-C TOTAL SCORE: -1
HOW OFTEN DO YOU HAVE 6 OR MORE DRINKS ON ONE OCCASION: PATIENT UNABLE TO ANSWER
HOW OFTEN DO YOU HAVE A DRINK CONTAINING ALCOHOL: PATIENT UNABLE TO ANSWER
HOW MANY STANDARD DRINKS CONTAINING ALCOHOL DO YOU HAVE ON A TYPICAL DAY: PATIENT UNABLE TO ANSWER
AUDIT-C TOTAL SCORE: -1
SKIP TO QUESTIONS 9-10: 0

## 2024-08-27 ASSESSMENT — PAIN DESCRIPTION - LOCATION: LOCATION: PENIS

## 2024-08-27 NOTE — ANESTHESIA PREPROCEDURE EVALUATION
Patient: Marco Antonio Merlos Jr.    Procedure Information       Date/Time: 08/27/24 0830    Procedure: Holmium Laser Enucleation Transurethral Prostate (Prostate)    Location: [facility] A OR 03 / Virtual [facility] OR    Surgeons: Good Huffman MD            Relevant Problems   Cardiac   (+) Rib pain on right side      GI   (+) Dysphagia      /Renal   (+) Benign prostatic hyperplasia with urinary retention      Hematology   (+) Anemia   (+) Coagulation defect, unspecified (Multi)   (+) Thromboembolism (Multi)      Musculoskeletal   (+) Cervical spondylosis with myelopathy   (+) Lumbar spinal stenosis   (+) Primary osteoarthritis of right knee   (+) Scoliosis, unspecified   (+) Spinal stenosis, lumbar region without neurogenic claudication   (+) Stenosis, cervical spine       Clinical information reviewed:                    Past Medical History:   Diagnosis Date    Neuropathy     Osteoarthritis     Parkinson disease (Multi)     RLS (restless legs syndrome)     Scoliosis     Seizure (Multi) 2020    1 episode, unknown cause    Spinal stenosis     Thromboembolism (Multi)     son denies - no history of blood clots    Urinary tract infection     admitted 7/2024 for complicated UTI and hematuria requiring cystoscopy with cauterization.      Past Surgical History:   Procedure Laterality Date    APPENDECTOMY      COLONOSCOPY      CYSTOSCOPY      with cauterization    HIP FRACTURE SURGERY  2022    SPINE SURGERY      TOTAL KNEE ARTHROPLASTY Right 2022    TOTAL KNEE ARTHROPLASTY Left      Social History     Tobacco Use    Smoking status: Never    Smokeless tobacco: Never   Vaping Use    Vaping status: Never Used   Substance Use Topics    Alcohol use: Never    Drug use: Never      Current Outpatient Medications   Medication Instructions    ciprofloxacin (CIPRO) 500 mg, oral, 2 times daily    DULoxetine (Cymbalta) 30 mg DR capsule Take 1 capsule (30 mg) by mouth once daily for 7 days, THEN 2 capsules (60 mg) once daily. Do not crush or  "chew..    finasteride (PROSCAR) 5 mg, oral, Daily    magnesium oxide (MAG-OX) 400 mg, oral, Daily    tamsulosin (FLOMAX) 0.4 mg, oral, Daily      No Known Allergies     Chemistry    Lab Results   Component Value Date/Time     08/12/2024 0917    K 3.8 08/12/2024 0917     08/12/2024 0917    CO2 26 08/12/2024 0917    BUN 21 08/12/2024 0917    CREATININE 0.94 08/12/2024 0917    Lab Results   Component Value Date/Time    CALCIUM 8.6 08/12/2024 0917    ALKPHOS 74 07/10/2024 0809    AST 12 07/10/2024 0809    ALT 7 (L) 07/10/2024 0809    BILITOT 0.7 07/10/2024 0809          No results found for: \"HGBA1C\"  Lab Results   Component Value Date/Time    WBC 13.4 (H) 08/12/2024 0917    HGB 12.8 (L) 08/12/2024 0917    HGB 13.4 (A) 05/21/2024 1403    HCT 39.1 (L) 08/12/2024 0917     08/12/2024 0917     Lab Results   Component Value Date/Time    PROTIME 12.6 12/10/2022 1956    INR 1.1 12/10/2022 1956     No results found for: \"ABORH\"  Encounter Date: 07/09/24   ECG 12 Lead   Result Value    Ventricular Rate 110    Atrial Rate 110    CT Interval 146    QRS Duration 86    QT Interval 332    QTC Calculation(Bazett) 449    P Axis 27    R Axis -25    T Axis 10    QRS Count 18    Q Onset 211    P Onset 138    P Offset 186    T Offset 377    QTC Fredericia 406    Narrative    Sinus tachycardia  Inferior infarct (cited on or before 10-DEC-2022)  Abnormal ECG  When compared with ECG of 10-DEC-2022 20:15,  No significant change was found  Confirmed by Dagoberto Justice ([id number]) on 7/11/2024 3:53:49 PM     No results found for this or any previous visit from the past 1095 days.       Visit Vitals  Smoking Status Never     No data recorded    Physical Exam    Airway  Mallampati: II  TM distance: >3 FB  Neck ROM: full     Cardiovascular - normal exam     Dental - normal exam     Pulmonary - normal exam     Abdominal - normal exam             Anesthesia Plan    History of general anesthesia?: yes  History of complications of general " anesthesia?: no    ASA 2     general     The patient is not a current smoker.    intravenous induction   Postoperative administration of opioids is intended.  Anesthetic plan and risks discussed with patient.    Plan discussed with CRNA.

## 2024-08-27 NOTE — ANESTHESIA POSTPROCEDURE EVALUATION
Patient: Marco Antonio Merlos Jr.    Procedure Summary       Date: 08/27/24 Room / [facility]: [facility] / [facility]    Anesthesia Start: 0830 Anesthesia Stop:     Procedure: Holmium Laser Enucleation Transurethral Prostate (Prostate) Diagnosis:       Benign prostatic hyperplasia with urinary retention      (Benign prostatic hyperplasia with urinary retention [N40.1, R33.8])    Surgeons: Good Huffman MD Responsible Provider: Miguel Lal MD    Anesthesia Type: general ASA Status: 2            Anesthesia Type: general    Vitals Value Taken Time   /68 08/27/24 1145   Temp 34 08/27/24 1145   Pulse 55 08/27/24 1145   Resp 13 08/27/24 1145   SpO2 100 08/27/24 1145       Anesthesia Post Evaluation    Patient location during evaluation: PACU  Patient participation: complete - patient participated  Level of consciousness: awake  Pain management: adequate  Airway patency: patent  Cardiovascular status: acceptable  Respiratory status: acceptable  Hydration status: acceptable  Postoperative Nausea and Vomiting: none        No notable events documented.

## 2024-08-27 NOTE — CARE PLAN
The patient's goals for the shift include      The clinical goals for the shift include patient confused, unable to answer  Problem: Skin  Goal: Decreased wound size/increased tissue granulation at next dressing change  Outcome: Progressing  Goal: Participates in plan/prevention/treatment measures  Outcome: Progressing  Goal: Prevent/manage excess moisture  Outcome: Progressing  Flowsheets (Taken 8/27/2024 8052)  Prevent/manage excess moisture: Moisturize dry skin  Goal: Prevent/minimize sheer/friction injuries  Outcome: Progressing  Goal: Promote/optimize nutrition  Outcome: Progressing  Goal: Promote skin healing  Outcome: Progressing     Problem: Pain - Adult  Goal: Verbalizes/displays adequate comfort level or baseline comfort level  Outcome: Progressing     Problem: Safety - Adult  Goal: Free from fall injury  Outcome: Progressing     Problem: Discharge Planning  Goal: Discharge to home or other facility with appropriate resources  Outcome: Progressing     Problem: Chronic Conditions and Co-morbidities  Goal: Patient's chronic conditions and co-morbidity symptoms are monitored and maintained or improved  Outcome: Progressing

## 2024-08-27 NOTE — OP NOTE
Holmium Laser Enucleation Transurethral Prostate Operative Note     Date: 2024  OR Location: [facility] A OR    Name: Marco Antonio Merlos Jr., : 1945, Age: 79 y.o., MRN: [id number], Sex: male    Diagnosis  Pre-op Diagnosis      * Benign prostatic hyperplasia with urinary retention [N40.1, R33.8] Post-op Diagnosis     * Benign prostatic hyperplasia with urinary retention [N40.1, R33.8]     Procedures  Holmium Laser Enucleation Transurethral Prostate  19487 - [place] LASER ENUCLEATION PROSTATE W/MORCELLATION      Surgeons      * Good Huffman - Primary    Resident/Fellow/Other Assistant:  Surgeons and Role:  * No surgeons found with a matching role *    Procedure Summary  Anesthesia: General  ASA: II  Anesthesia Staff: Anesthesiologist: Miguel Lal MD  CRNA: JOSSY Yun-CRNA  C-AA: ROSENDO Samayoa  Estimated Blood Loss: 50mL  Intra-op Medications:   Administrations occurring from 0830 to 1030 on 24:   Medication Name Total Dose   sodium chloride 0.9 % irrigation solution 24,000 mL              Anesthesia Record               Intraprocedure I/O Totals          Intake    LR infusion 1200.00 mL    Total Intake 1200 mL       Output    Est. Blood Loss 50 mL    Total Output 50 mL       Net    Net Volume 1150 mL          Specimen:   ID Type Source Tests Collected by Time   1 : prostate tissue Tissue PROSTATE HOLEP SURGICAL PATHOLOGY EXAM Good Huffman MD 2024 1110        Staff:   Circulator: Nic  Scrub Person: Mirella         Drains and/or Catheters: * None in log *    Tourniquet Times:         Implants:     Findings: massively enlarged trilobar obstruction >250cc, adding >1h to case. Poor planes    Indications: Marco Antonio Merlos Jr. is an 79 y.o. male who is having surgery for Benign prostatic hyperplasia with urinary retention [N40.1, R33.8].     The patient was seen in the preoperative area. The risks, benefits, complications, treatment options, non-operative alternatives, expected recovery  and outcomes were discussed with the patient. The possibilities of reaction to medication, pulmonary aspiration, injury to surrounding structures, bleeding, recurrent infection, the need for additional procedures, failure to diagnose a condition, and creating a complication requiring transfusion or operation were discussed with the patient. The patient concurred with the proposed plan, giving informed consent.  The site of surgery was properly noted/marked if necessary per policy. The patient has been actively warmed in preoperative area. Preoperative antibiotics have been ordered and given within 1 hours of incision. Venous thrombosis prophylaxis have been ordered including bilateral sequential compression devices    Procedure Details:   1. HOLMIUM LASER ENUCLEATION OF THE PROSTATE       Laser Settings Used:  Cutting 2 J, 40 Hz.  Coagulation 1.5 J, 30 Hz.   Sidney or Virtual basket used? Sidney  Preoperative Prostate Size:  approx 250 cubic centimeters.     Prostate configuration: trilobar  Complication: none  EBL: 50 ml  Catheter: 22Fr 3 way  Antibiotics: ancef, gent  Specimen: Morcellated prostatic tissue.    DESCRIPTION OF PROCEDURE:      The patient was brought to the OR and after good general anesthesia was achieved, the patient was prepped and draped in dorsal lithotomy position. All pressure points were padded.  Surgical pause was performed.  The patient was identified using 2 identifiers.  The correct surgical procedure was confirmed.  All members of surgical and anesthesia team were in agreement.  The patient received prophylactic antibiotic and the procedure started.    Cystoscopy: The patient's bladder was first entered with a 22-Saudi Arabian rigid cystoscope with 30-degree lens.  Cystoscopic examination showed normal anterior urethra.  The posterior urethra showed massive trilobar obstructions.  Both ureteral orifices were into able to be identified. The cystoscope was removed and the meatus was dilated up to  28-Bengali using sounds.  The urethra was then filled with lidocaine gel and a 26-Bengali Morrison continuous-flow resectoscope was placed.  The holmium laser apparatus was placed through the sheath.  Using a 550-micron end-firing quartz laser fiber, a laser bridge, and a 7-Bengali laser stabilizing catheter, the procedure was started with the above-mentioned laser setting.    A en bloc technique was performed with an initial incision at the apex and circumferentially using low energy.  We continued to develop our anterior plane at the apex, identifying the capsule and freeing up the anterior apex well within the sphincter. We then proceeded with our posterior dissection, developing the posterior space toward the bladder neck and continuing our incision laterally to 9 and 3:00.    Then we turned our attention to the lateral attachments of the prostate.  Using laser energy, taking care to avoid any damage to the sphincter, we connected our previously dissected anterior and posterior planes to completely liberate the apex of the prostate preserving the sphincter. We then continued our dissection circumferentially, freeing the prostate systematically from apex to its attachments at the bladder neck. The adenoma was then pushed into the bladder.     Once the prostate had been fully enucleated, the laser was defocused on any sources of bleeding to get additional hemostasis.  There was good hemostasis at the conclusion of this procedure.  A few small remaining nodular adenomas were then resected from the capsule.      The laser bridge apparatus and the resectoscope were then removed and the offset Morrison nephroscope and Prince Stevenson tissue morcellator were then introduced and used to completely morcellate the tissue.  Two inflows were used during this portion of the procedure to fully distend the bladder and to prevent any inadvertent bladder injury.  The bladder was then ellik'd out after insertion of the inner sheath and  reinspected with the cystoscope showing no residual adenomas.  Hemostasis was ensured at the conclusion of the procedure and both ureteral orifices were confirmed and identified well away from the area of resection.  No residual adenoma could be identified. Uos could not be seen, but there was an area of bullous edema bilaterally uninvolved with resection that I suspect contained the Uos    Due to massive size >250cc, >1hr was added to case    Following that, a three-way Bell catheter on a guide was placed into the bladder and the balloon was filled.  The bladder was irrigated near clear and the continuous irrigation was started. 20mg IV lasix given.    The patient tolerated the procedure well and was shifted to recovery in good general condition   Complications:  None; patient tolerated the procedure well.    Disposition: PACU - hemodynamically stable.  Condition: stable         Additional Details:     Attending Attestation:     Good Huffman  Phone Number: [phone]

## 2024-08-27 NOTE — ANESTHESIA PROCEDURE NOTES
Airway  Date/Time: 8/27/2024 8:49 AM  Urgency: elective      Staffing  Performed: CRNA   Authorized by: Miguel Lal MD    Performed by: JOSSY Yun-PRAVIN  Patient location during procedure: OR    Indications and Patient Condition  Indications for airway management: anesthesia  Sedation level: deep  Preoxygenated: yes      Final Airway Details  Final airway type: supraglottic airway      Successful airway: Supraglottic airway: iGEL.  Size 5     Number of attempts at approach: 1

## 2024-08-27 NOTE — DISCHARGE INSTRUCTIONS
DEPARTMENT OF UROLOGY  DISCHARGE INSTRUCTIONS [facility]  Outpatient Surgery    C O N F I D E N T I A L   I N F O R M A T I O N    Marco Antonio Merlos Jr.        Call [phone] during regular daytime business hours (8:00 am - 5:00 pm) and after 5:00 pm and ask for the Urology resident with any questions or concerns.      If it is a life-threatening situation, proceed to the nearest emergency department.        Follow-up appointment:  8/29/24 (catheter removal)     Thank you for the opportunity to care for you today.  Your health and healing are very important to us.  We hope we made you feel as comfortable as possible and are committed to your recovery and continued well-being.      The following is a brief overview of your transurethral prostate resection today. Some of the information contained on this summary may be confidential.  This information should be kept in your records and should be shared with your regular doctor.    Physicians:   Dr. Huffman      Procedure performed: Prostate Resection  Pending results:   pathology of tissue taken from your prostate    What to Expect During your Recovery and Home Care  Anesthesia Side Effects   You received general anesthesia today.  You may feel sleepy, tired, or have a sore throat.   You may also feel drowsiness, dizziness, or inability to think clearly.  For your safety, do not drive, drink alcoholic beverages, take any unprescribed medication or make any important decisions for 24 hours.  A responsible adult should be with you for 24 hours.        Activity and Recovery    No heavy lifting over ten pounds. Limit activity while urinary catheter is in place. Avoid activities that would cause pulling or tugging on your catheter.    Do not drive or operate heavy machinery while taking narcotic pain medications as these medications can alter perception, impair judgement, and slow reaction times.    Pain Control  Unfortunately, you may experience pain after your procedure.   Adequate management can include alternative measures to help ease your pain and can include over the counter Tylenol. Do not take more than 4,000mg of Tylenol in a 24-hour period.      Nausea/Vomiting   Clear liquids are best tolerated at first. Start slow, advance your diet as tolerated to normal foods. Avoid spicy, greasy, heavy foods at first. Also, you may feel nauseous or like you need to vomit if you take any type of medication on an empty stomach.  Call your physician if you are unable to eat or drink and have persistent vomiting.    Signs of Bleeding   You are going to have some blood in your urine. Your urine will be light pink to yellow. You always want to look at the urine in the tubing of your catheter and not in the large urine collection bag to check for bleeding. If urine becomes thick dark kyle red, has large clots or stops draining, please notify your physician.    Wait until two days after your surgery to resume your blood thinner medication (Thursday 8/29/24).    Treatment/wound care:   Keep area(s) clean and dry. Clean around the tip or your penis were the catheter goes in daily with mild soap and water.  It is okay to shower 24 hours after time of surgery.    Do not submerge your catheter in standing water until seen for follow up appointment (no tub bathing, swimming, or hot tubs).      Signs of Infection  Signs of infection can include fever, drainage(green/yellow), chills, burning sensation with passing of urine, catheter leakage, or severe abdominal pain.  If you see any of these occur, please contact your doctor's office at [phone].  Any fever higher than 100.4, especially if associated with an ill feeling, abdominal pain, chills, or nausea should be reported to your surgeon.      Assist in bowel movements/urination  Increase fiber in diet  Increase water (6 to 8 glasses)  Increase walking     Additional Instructions: CATHETER CARE  Always keep the catheters tubing and drainage bag  below the level of your bladder.  Avoid loops and kinks in the catheter tubing.  NOTIFY your physician if catheter falls out or catheter seems clogged and urine is not draining.   Do not wear the small leg bag to bed you should be provided with a larger overnight bag that you should wear to bed and can hang over the side of the bed.  We recommend wearing the large bag in the shower, as this is easy to dry, and you do not get your leg straps wet from your leg bag.   Your catheter should be secured to your upper thigh, do not allow it to hang or dangle.  Your catheter will be removed at your post-operative appointment.

## 2024-08-28 VITALS
BODY MASS INDEX: 25.75 KG/M2 | SYSTOLIC BLOOD PRESSURE: 100 MMHG | DIASTOLIC BLOOD PRESSURE: 64 MMHG | HEIGHT: 65 IN | OXYGEN SATURATION: 95 % | TEMPERATURE: 96.8 F | WEIGHT: 154.54 LBS | RESPIRATION RATE: 18 BRPM | HEART RATE: 85 BPM

## 2024-08-28 PROBLEM — N40.1 BPH WITH URINARY OBSTRUCTION: Status: RESOLVED | Noted: 2024-08-27 | Resolved: 2024-08-28

## 2024-08-28 PROBLEM — N13.8 BPH WITH URINARY OBSTRUCTION: Status: RESOLVED | Noted: 2024-08-27 | Resolved: 2024-08-28

## 2024-08-28 LAB
ANION GAP SERPL CALC-SCNC: 13 MMOL/L (ref 10–20)
BASOPHILS # BLD AUTO: 0.05 X10*3/UL (ref 0–0.1)
BASOPHILS NFR BLD AUTO: 0.4 %
BUN SERPL-MCNC: 20 MG/DL (ref 6–23)
CALCIUM SERPL-MCNC: 7.9 MG/DL (ref 8.6–10.3)
CHLORIDE SERPL-SCNC: 108 MMOL/L (ref 98–107)
CO2 SERPL-SCNC: 21 MMOL/L (ref 21–32)
CREAT SERPL-MCNC: 0.92 MG/DL (ref 0.5–1.3)
EGFRCR SERPLBLD CKD-EPI 2021: 84 ML/MIN/1.73M*2
EOSINOPHIL # BLD AUTO: 0.04 X10*3/UL (ref 0–0.4)
EOSINOPHIL NFR BLD AUTO: 0.3 %
ERYTHROCYTE [DISTWIDTH] IN BLOOD BY AUTOMATED COUNT: 14.5 % (ref 11.5–14.5)
FOLATE SERPL-MCNC: 8.2 NG/ML
GLUCOSE SERPL-MCNC: 108 MG/DL (ref 74–99)
HCT VFR BLD AUTO: 28.7 % (ref 41–52)
HGB BLD-MCNC: 9.5 G/DL (ref 13.5–17.5)
IMM GRANULOCYTES # BLD AUTO: 0.1 X10*3/UL (ref 0–0.5)
IMM GRANULOCYTES NFR BLD AUTO: 0.7 % (ref 0–0.9)
LYMPHOCYTES # BLD AUTO: 1.06 X10*3/UL (ref 0.8–3)
LYMPHOCYTES NFR BLD AUTO: 7.5 %
MCH RBC QN AUTO: 33 PG (ref 26–34)
MCHC RBC AUTO-ENTMCNC: 33.1 G/DL (ref 32–36)
MCV RBC AUTO: 100 FL (ref 80–100)
MONOCYTES # BLD AUTO: 1.05 X10*3/UL (ref 0.05–0.8)
MONOCYTES NFR BLD AUTO: 7.4 %
NEUTROPHILS # BLD AUTO: 11.81 X10*3/UL (ref 1.6–5.5)
NEUTROPHILS NFR BLD AUTO: 83.7 %
NRBC BLD-RTO: 0 /100 WBCS (ref 0–0)
PLATELET # BLD AUTO: 320 X10*3/UL (ref 150–450)
POTASSIUM SERPL-SCNC: 4.5 MMOL/L (ref 3.5–5.3)
RBC # BLD AUTO: 2.88 X10*6/UL (ref 4.5–5.9)
SODIUM SERPL-SCNC: 137 MMOL/L (ref 136–145)
VIT B12 SERPL-MCNC: 727 PG/ML (ref 211–911)
WBC # BLD AUTO: 14.1 X10*3/UL (ref 4.4–11.3)

## 2024-08-28 PROCEDURE — 2500000004 HC RX 250 GENERAL PHARMACY W/ HCPCS (ALT 636 FOR OP/ED): Performed by: STUDENT IN AN ORGANIZED HEALTH CARE EDUCATION/TRAINING PROGRAM

## 2024-08-28 PROCEDURE — 99238 HOSP IP/OBS DSCHRG MGMT 30/<: CPT

## 2024-08-28 PROCEDURE — 82746 ASSAY OF FOLIC ACID SERUM: CPT | Mod: AHULAB | Performed by: HOSPITALIST

## 2024-08-28 PROCEDURE — 36415 COLL VENOUS BLD VENIPUNCTURE: CPT | Performed by: STUDENT IN AN ORGANIZED HEALTH CARE EDUCATION/TRAINING PROGRAM

## 2024-08-28 PROCEDURE — 97165 OT EVAL LOW COMPLEX 30 MIN: CPT | Mod: GO

## 2024-08-28 PROCEDURE — 7100000011 HC EXTENDED STAY RECOVERY HOURLY - NURSING UNIT

## 2024-08-28 PROCEDURE — 80048 BASIC METABOLIC PNL TOTAL CA: CPT | Performed by: STUDENT IN AN ORGANIZED HEALTH CARE EDUCATION/TRAINING PROGRAM

## 2024-08-28 PROCEDURE — 2500000001 HC RX 250 WO HCPCS SELF ADMINISTERED DRUGS (ALT 637 FOR MEDICARE OP): Performed by: STUDENT IN AN ORGANIZED HEALTH CARE EDUCATION/TRAINING PROGRAM

## 2024-08-28 PROCEDURE — 85025 COMPLETE CBC W/AUTO DIFF WBC: CPT | Performed by: NURSE PRACTITIONER

## 2024-08-28 PROCEDURE — 97116 GAIT TRAINING THERAPY: CPT | Mod: GP

## 2024-08-28 PROCEDURE — 82607 VITAMIN B-12: CPT | Mod: AHULAB | Performed by: HOSPITALIST

## 2024-08-28 PROCEDURE — 97161 PT EVAL LOW COMPLEX 20 MIN: CPT | Mod: GP

## 2024-08-28 PROCEDURE — 36415 COLL VENOUS BLD VENIPUNCTURE: CPT | Performed by: HOSPITALIST

## 2024-08-28 SDOH — ECONOMIC STABILITY: TRANSPORTATION INSECURITY
IN THE PAST 12 MONTHS, HAS THE LACK OF TRANSPORTATION KEPT YOU FROM MEDICAL APPOINTMENTS OR FROM GETTING MEDICATIONS?: NO

## 2024-08-28 SDOH — HEALTH STABILITY: MENTAL HEALTH: HOW OFTEN DO YOU HAVE A DRINK CONTAINING ALCOHOL?: NEVER

## 2024-08-28 SDOH — ECONOMIC STABILITY: FOOD INSECURITY: WITHIN THE PAST 12 MONTHS, YOU WORRIED THAT YOUR FOOD WOULD RUN OUT BEFORE YOU GOT MONEY TO BUY MORE.: NEVER TRUE

## 2024-08-28 SDOH — ECONOMIC STABILITY: HOUSING INSECURITY: IN THE PAST 12 MONTHS, HOW MANY TIMES HAVE YOU MOVED WHERE YOU WERE LIVING?: 0

## 2024-08-28 SDOH — ECONOMIC STABILITY: TRANSPORTATION INSECURITY
IN THE PAST 12 MONTHS, HAS LACK OF TRANSPORTATION KEPT YOU FROM MEETINGS, WORK, OR FROM GETTING THINGS NEEDED FOR DAILY LIVING?: NO

## 2024-08-28 SDOH — ECONOMIC STABILITY: FOOD INSECURITY: WITHIN THE PAST 12 MONTHS, THE FOOD YOU BOUGHT JUST DIDN'T LAST AND YOU DIDN'T HAVE MONEY TO GET MORE.: NEVER TRUE

## 2024-08-28 SDOH — ECONOMIC STABILITY: INCOME INSECURITY: IN THE PAST 12 MONTHS, HAS THE ELECTRIC, GAS, OIL, OR WATER COMPANY THREATENED TO SHUT OFF SERVICE IN YOUR HOME?: NO

## 2024-08-28 SDOH — HEALTH STABILITY: MENTAL HEALTH: HOW OFTEN DO YOU HAVE 6 OR MORE DRINKS ON ONE OCCASION?: NEVER

## 2024-08-28 SDOH — ECONOMIC STABILITY: INCOME INSECURITY: IN THE LAST 12 MONTHS, WAS THERE A TIME WHEN YOU WERE NOT ABLE TO PAY THE MORTGAGE OR RENT ON TIME?: NO

## 2024-08-28 SDOH — HEALTH STABILITY: MENTAL HEALTH: HOW MANY STANDARD DRINKS CONTAINING ALCOHOL DO YOU HAVE ON A TYPICAL DAY?: PATIENT DOES NOT DRINK

## 2024-08-28 SDOH — ECONOMIC STABILITY: INCOME INSECURITY: HOW HARD IS IT FOR YOU TO PAY FOR THE VERY BASICS LIKE FOOD, HOUSING, MEDICAL CARE, AND HEATING?: NOT HARD AT ALL

## 2024-08-28 SDOH — SOCIAL STABILITY: SOCIAL NETWORK: ARE YOU MARRIED, WIDOWED, DIVORCED, SEPARATED, NEVER MARRIED, OR LIVING WITH A PARTNER?: DIVORCED

## 2024-08-28 SDOH — ECONOMIC STABILITY: HOUSING INSECURITY: AT ANY TIME IN THE PAST 12 MONTHS, WERE YOU HOMELESS OR LIVING IN A SHELTER (INCLUDING NOW)?: NO

## 2024-08-28 ASSESSMENT — COGNITIVE AND FUNCTIONAL STATUS - GENERAL
TURNING FROM BACK TO SIDE WHILE IN FLAT BAD: A LOT
MOVING FROM LYING ON BACK TO SITTING ON SIDE OF FLAT BED WITH BEDRAILS: A LITTLE
MOBILITY SCORE: 17
CLIMB 3 TO 5 STEPS WITH RAILING: A LOT
PERSONAL GROOMING: A LITTLE
WALKING IN HOSPITAL ROOM: A LITTLE
WALKING IN HOSPITAL ROOM: A LOT
DAILY ACTIVITIY SCORE: 16
TOILETING: A LITTLE
DRESSING REGULAR LOWER BODY CLOTHING: TOTAL
TOILETING: A LITTLE
HELP NEEDED FOR BATHING: A LOT
DRESSING REGULAR LOWER BODY CLOTHING: A LOT
DRESSING REGULAR UPPER BODY CLOTHING: A LITTLE
MOBILITY SCORE: 13
PERSONAL GROOMING: A LITTLE
HELP NEEDED FOR BATHING: A LITTLE
MOVING FROM LYING ON BACK TO SITTING ON SIDE OF FLAT BED WITH BEDRAILS: A LITTLE
MOVING TO AND FROM BED TO CHAIR: A LITTLE
DAILY ACTIVITIY SCORE: 18
TURNING FROM BACK TO SIDE WHILE IN FLAT BAD: A LITTLE
CLIMB 3 TO 5 STEPS WITH RAILING: A LOT
DRESSING REGULAR UPPER BODY CLOTHING: A LITTLE
MOVING TO AND FROM BED TO CHAIR: A LOT
STANDING UP FROM CHAIR USING ARMS: A LOT
STANDING UP FROM CHAIR USING ARMS: A LITTLE

## 2024-08-28 ASSESSMENT — PAIN SCALES - GENERAL
PAINLEVEL_OUTOF10: 0 - NO PAIN
PAINLEVEL_OUTOF10: 3
PAINLEVEL_OUTOF10: 1
PAINLEVEL_OUTOF10: 0 - NO PAIN
PAINLEVEL_OUTOF10: 0 - NO PAIN

## 2024-08-28 ASSESSMENT — PAIN - FUNCTIONAL ASSESSMENT
PAIN_FUNCTIONAL_ASSESSMENT: 0-10
PAIN_FUNCTIONAL_ASSESSMENT: 0-10

## 2024-08-28 ASSESSMENT — ACTIVITIES OF DAILY LIVING (ADL)
ADL_ASSISTANCE: INDEPENDENT
BATHING_ASSISTANCE: MODERATE
ADL_ASSISTANCE: INDEPENDENT

## 2024-08-28 ASSESSMENT — LIFESTYLE VARIABLES
AUDIT-C TOTAL SCORE: 0
SKIP TO QUESTIONS 9-10: 1

## 2024-08-28 ASSESSMENT — PAIN DESCRIPTION - LOCATION: LOCATION: PENIS

## 2024-08-28 NOTE — DISCHARGE SUMMARY
"Discharge Diagnosis  Benign prostatic hyperplasia with urinary retention    Issues Requiring Follow-Up  Follow up with surgeon for post op check    Test Results Pending At Discharge  Pending Labs       Order Current Status    Folate In process    Surgical Pathology Exam In process    Vitamin B12 In process            Hospital Course  79 yr old male with BPH s/p HOLEP on 08/27/24 with Dr. Huffman. Please see operative report for full details. Patient tolerated the procedure well and recovered briefly in PACU before being transitioned to regular nursing floor. Post-op course was uncomplicated. Diet was advanced as tolerated.  IV medication transitioned to oral as diet advanced. On the day of discharge, the pt was tolerating a diet, pain was controlled on PO pain medication, and they were ambulating and voiding spontaneously. Pt discharged to home on 08/28/24 in stable condition with instructions to follow up as outpatient.    Prior to discharge holley catheter manually irrigated with approx 100ml sterile solution, small amount of clots removed. Bladder filled with approx 400ml sterile solution, 100cc ballon deflated, s/p void, PVR 150ml    /66   Pulse 90   Temp 36.5 °C (97.7 °F)   Resp 18   Ht 1.651 m (5' 5\")   Wt 70.1 kg (154 lb 8.7 oz)   SpO2 96%   BMI 25.72 kg/m²       PE:  Constitutional: A&Ox3, calm and cooperative, [place]  Eyes: clear sclera   ENMT: Moist mucous membranes  Head/Neck: Neck supple  Cardiovascular: Normal rate and regular rhythm.  Respiratory/Thorax:  Good symmetric chest expansion.   Gastrointestinal: Abdomen  soft non-tender   Genitourinary: as above  Musculoskeletal: Tremors  Extremities: No peripheral edema  Neurological: A&Ox3  Psychological: Appropriate mood and behavior  Skin: Warm and dry      Home Medications     Medication List      CONTINUE taking these medications     ciprofloxacin 500 mg tablet; Commonly known as: Cipro; Take 1 tablet   (500 mg) by mouth 2 times a day for 8 " days.   DULoxetine 30 mg DR capsule; Commonly known as: Cymbalta; Take 1 capsule   (30 mg) by mouth once daily for 7 days, THEN 2 capsules (60 mg) once   daily. Do not crush or chew..; Start taking on: December 14, 2023   finasteride 5 mg tablet; Commonly known as: Proscar; Take 1 tablet (5   mg) by mouth once daily.   magnesium oxide 400 mg (241.3 mg magnesium) tablet; Commonly known as:   Mag-Ox; Take 1 tablet (400 mg) by mouth once daily.   tamsulosin 0.4 mg 24 hr capsule; Commonly known as: Flomax; Take 1   capsule (0.4 mg) by mouth once daily.       Outpatient Follow-Up  Future Appointments   Date Time Provider Department Center   8/29/2024 To Be Determined Lesvia Powers, PT [facility]   8/29/2024  9:00 AM Leighann Pascal, APRN-CNP [id number]   9/12/2024  9:20 AM MD CHUCK Hickey [facility]   9/20/2024  2:20 PM [facility]       Venessa Walters APRN-CNP

## 2024-08-28 NOTE — PROGRESS NOTES
Physical Therapy    Physical Therapy Evaluation & Treatment    Patient Name: Marco Antonio Merlos Jr.  MRN: [id number]  Today's Date: 8/28/2024   Time Calculation  Start Time: 1402  Stop Time: 1422  Time Calculation (min): 20 min    Assessment/Plan   PT Assessment  PT Assessment Results: Decreased strength, Decreased endurance, Impaired balance, Decreased mobility, Decreased coordination, Decreased cognition, Impaired judgement, Decreased safety awareness  Rehab Prognosis: Fair  Barriers to Discharge: Needing increased supervision for safety throughout.  Evaluation/Treatment Tolerance: Patient tolerated treatment well  Medical Staff Made Aware: Yes  Strengths: Support of Caregivers, Housing layout, Premorbid level of function  Barriers to Participation: Ability to acquire knowledge, Attitude of self, Insight into problems  End of Session Communication: Bedside nurse, Care Coordinator  Assessment Comment: PT eval completed. Pt slightly limited in endurance and safety awareness, noted instability in standing and retropulsion lean with standing throughout session. No true hands on assist needed throughout session, anticipate improved participation during next session. Needing increased participation in PT plan of care for progression toward functional independence with mobility.   End of Session Patient Position: Alarm off, caregiver present, Bed, 3 rail up   IP OR SWING BED PT PLAN  Inpatient or Swing Bed: Inpatient  PT Plan  Treatment/Interventions: Bed mobility, Transfer training, Gait training, Neuromuscular re-education, Balance training, Endurance training, Positioning, Postural re-education, Home exercise program, Therapeutic exercise, Therapeutic activity, Strengthening  PT Plan: Ongoing PT  PT Frequency: 3 times per week  PT Discharge Recommendations: Low intensity level of continued care  Equipment Recommended upon Discharge: Wheeled walker  PT Recommended Transfer Status: Stand by assist  PT - OK to Discharge: Yes  (Per PT POC)      Subjective     General Visit Information:  General  Reason for Referral: Impaired Mobility, Gait training; Pt is an 79 year old male with benign prosthetic hyperplasia with urinary retention, s/p transurethral resection of prostate.  Referred By: LOCO Talbot  Past Medical History Relevant to Rehab:   Past Medical History:   Diagnosis Date    Neuropathy     Osteoarthritis     Parkinson disease (Multi)     RLS (restless legs syndrome)     Scoliosis     Seizure (Multi) 2020    1 episode, unknown cause    Spinal stenosis     Thromboembolism (Multi)     son denies - no history of blood clots    Urinary tract infection     admitted 7/2024 for complicated UTI and hematuria requiring cystoscopy with cauterization.     Past Surgical History:   Procedure Laterality Date    APPENDECTOMY      COLONOSCOPY      CYSTOSCOPY      with cauterization    HIP FRACTURE SURGERY  2022    SPINE SURGERY      TOTAL KNEE ARTHROPLASTY Right 2022    TOTAL KNEE ARTHROPLASTY Left        Family/Caregiver Present: Yes  Caregiver Feedback: Son present and clarifying answers to PLOF throughout session.  Co-Treatment: OT  Co-Treatment Reason: to maximize pt participation, agitated by need for participation, likely refusing 2 separate sessions.  Prior to Session Communication: Bedside nurse  Patient Position Received: Bed, 3 rail up, Alarm off, not on at start of session  Preferred Learning Style: auditory, verbal  General Comment: Patient with increased time and effort with mobility tasks, posterior lean when attempting to stand. Cooperative with encouragement.  Home Living:  Home Living  Type of Home: House  Lives With: Alone  Home Adaptive Equipment: Walker rolling or standard, Cane, Wheelchair-manual, Reacher (Lift chair)  Home Layout: One level, Laundry in basement, Able to live on main level with bedroom/bathroom, Stairs to alternate level with rails  Alternate Level Stairs-Rails:  (Single)  Alternate Level Stairs-Number of Steps:  12  Home Access: Ramped entrance, Stairs to enter with rails  Entrance Stairs-Rails: Both  Entrance Stairs-Number of Steps: 3  Bathroom Shower/Tub: Walk-in shower  Bathroom Toilet: Handicapped height  Bathroom Equipment: Grab bars around toilet, Grab bars in shower, Shower chair with back  Prior Level of Function:  Prior Function Per Pt/Caregiver Report  Level of [place]: Independent with ADLs and functional transfers, Needs assistance with homemaking  Receives Help From: Family  ADL Assistance: Independent (Use of long handled devices for ease)  Homemaking Assistance: Needs assistance (Meals on wheels, also DIL for assist with laundry and cleaning)  Ambulatory Assistance: Independent (Use of walker at baseline, denies falls.)  Vocational: Retired  Hand Dominance: Right  Prior Function Comments: -, reports actively working with PT at home for Parkinson's  Precautions:  Precautions  Hearing/Visual Limitations: Reading glasses, B hearing aides  Medical Precautions: Fall precautions  Precautions Comment: Hx of Parkinson's and RLE leg length discrepancy    Objective   Pain:  Pain Assessment  Pain Assessment: 0-10  0-10 (Numeric) Pain Score: 0 - No pain  Cognition:  Cognition  Overall Cognitive Status: Impaired  Arousal/Alertness: Delayed responses to stimuli  Orientation Level: Disoriented to place, Disoriented to situation (Knew was in hospital, did not know name or system, prompting for place and situation)  Following Commands: Follows one step commands with repetition  Safety Judgment: Decreased awareness of need for safety precautions  Problem Solving: Assistance required to identify errors made  Attention: Exceptions to WFL  Sustained Attention: Impaired  Memory: Exceptions to WFL  Short-Term Memory: Impaired  Safety/Judgement: Exceptions to WFL  Unable to Self-Monitor and Self-Correct Consistently: Moderate  Insight: Severe  Impulsive: Moderately  Flexibility of Thought: No flexibility  Planning:  "Reduced planning skills  Processing Speed: Delayed    General Assessments:  General Observation  General Observation: Pt slightly resistant to participate until encouraged by son. Needing no true hands on assist this date, but close supervision due to instability and retropulsive lean.               Activity Tolerance  Endurance: Tolerates 10 - 20 min exercise with multiple rests    Sensation  Light Touch: Partial deficits in the RLE, Partial deficits in the LLE  Sensation Comment: Reports BLE numb from \"falling sleep\" when sitting too long, happens chronically    Strength  Strength Comments: No true hands on assist for mobility  Strength  Strength Comments: No true hands on assist for mobility    Perception  Inattention/Neglect: Appears intact      Coordination  Movements are Fluid and Coordinated: No  Coordination Comment: Noted difficulty with coordination of extremities for functional transfers, difficulty with fine motor control tasks    Postural Control  Postural Control: Impaired  Trunk Control: Noted heavily retropulsive, leaning on bed with BLE for upright propulsion, has lift chair at home.    Static Sitting Balance  Static Sitting-Balance Support: Feet supported, Bilateral upper extremity supported  Static Sitting-Level of Assistance: Close supervision  Static Sitting-Comment/Number of Minutes: Tending to lean back and to R side  Dynamic Sitting Balance  Dynamic Sitting-Balance Support: Feet supported, Bilateral upper extremity supported  Dynamic Sitting-Level of Assistance: Close supervision  Dynamic Sitting-Balance: Forward lean, Lateral lean, Trunk control activities    Static Standing Balance  Static Standing-Balance Support: Bilateral upper extremity supported  Static Standing-Level of Assistance: Close supervision  Static Standing-Comment/Number of Minutes: Hovering due to retropulsive nature  Dynamic Standing Balance  Dynamic Standing-Balance Support: Bilateral upper extremity supported  Dynamic " Standing-Level of Assistance: Close supervision  Dynamic Standing-Balance: Turning (Ambulation)  Functional Assessments:  Bed Mobility  Bed Mobility: No (Encountered sitting at EOB, left in same)    Transfers  Transfer: Yes  Transfer 1  Technique 1: Sit to stand, Stand to sit  Transfer Device 1: Walker  Transfer Level of Assistance 1: Close supervision  Trials/Comments 1: No true hands on assist, need for multiple trials for success. Heavy retropulsive lean, cuing for hand placement and sequencing.    Ambulation/Gait Training  Ambulation/Gait Training Performed: Yes  Ambulation/Gait Training 1  Surface 1: Level tile  Device 1: Rolling walker  Assistance 1: Close supervision  Quality of Gait 1: Wide base of support, Diminished heel strike, Inconsistent stride length, Forward flexed posture (Noted initially increased L toe out, also RLE longer than other, leg length discrepancy. Small short steps as expected for hx of Parkinson's. Limited endurance, cues for upright posture and staying close to WW. No overt LOB.)  Comments/Distance (ft) 1: 50'    Stairs  Stairs: No  Extremity/Trunk Assessments:  RUE   RUE : Within Functional Limits  LUE   LUE: Within Functional Limits  RLE   RLE : Within Functional Limits (Noted RLE longer than LLE in standing, pt using croc slip on shoe on LLE to adjust height at home.)  LLE   LLE : Within Functional Limits  Treatments:  Other Activity  Other Activity Performed: Yes  Other Activity 1: Increased education to pt and son about plan of care, progression with therapy, and participation. Increased gait over and above assessment needs. Increased dynamic trunk stabilization both in sitting and standing.  Outcome Measures:  [facility] Basic Mobility  Turning from your back to your side while in a flat bed without using bedrails: A little  Moving from lying on your back to sitting on the side of a flat bed without using bedrails: A little  Moving to and from bed to chair (including a wheelchair): A  little  Standing up from a chair using your arms (e.g. wheelchair or bedside chair): A little  To walk in hospital room: A little  Climbing 3-5 steps with railing: A lot  Basic Mobility - Total Score: 17    Encounter Problems       Encounter Problems (Resolved)       Balance       STG - Maintains dynamic standing balance with upper extremity support At [facility], safely, without LOB, device PRN  (Adequate for Discharge)       Start:  08/28/24    Expected End:  09/11/24    Resolved:  08/28/24    INTERVENTIONS:  1. Practice standing with minimal support.  2. Educate patient about standing tolerance.  3. Educate patient about independence with gait, transfers, and ADL's.  4. Educate patient about use of assistive device.  5. Educate patient about self-directed care.            Mobility       STG - Patient will ambulate At [facility] using Wheeled walker for 100' without LOB or gross gait deviations  (Adequate for Discharge)       Start:  08/28/24    Expected End:  09/11/24    Resolved:  08/28/24         Endurance - Pt to tolerate >/= 20 minutes therex, theract, gait and/or NMR with </= 5 minutes of rest breaks  (Adequate for Discharge)       Start:  08/28/24    Expected End:  09/11/24    Resolved:  08/28/24            PT Transfers       STG - Patient will perform bed mobility At [facility], safely, without LOB, device PRN  (Adequate for Discharge)       Start:  08/28/24    Expected End:  09/11/24    Resolved:  08/28/24         STG - Patient will transfer sit to and from stand At [facility], safely, without LOB, device PRN  (Adequate for Discharge)       Start:  08/28/24    Expected End:  09/11/24    Resolved:  08/28/24         STG - Patient will perform car transfer At [facility], safely, without LOB, device PRN  (Adequate for Discharge)       Start:  08/28/24    Expected End:  09/11/24    Resolved:  08/28/24            Safety       Pt will verbalize and apply safety awareness and precautions 100% of time throughout entire session   (Adequate  for Discharge)       Start:  08/28/24    Expected End:  09/11/24    Resolved:  08/28/24                Education Documentation  Precautions, taught by Garrett Santana, PT at 8/28/2024  2:35 PM.  Learner: Family, Patient  Readiness: Acceptance  Method: Demonstration, Explanation  Response: Verbalizes Understanding, Needs Reinforcement  Comment: Education about leg length discrepancy, and compensation for leg length discrepancy.    Body Mechanics, taught by Garrett Santana, PT at 8/28/2024  2:35 PM.  Learner: Family, Patient  Readiness: Acceptance  Method: Demonstration, Explanation  Response: Verbalizes Understanding, Needs Reinforcement  Comment: Education about leg length discrepancy, and compensation for leg length discrepancy.    Mobility Training, taught by Garrett Santana, PT at 8/28/2024  2:35 PM.  Learner: Family, Patient  Readiness: Acceptance  Method: Demonstration, Explanation  Response: Verbalizes Understanding, Needs Reinforcement  Comment: Education about leg length discrepancy, and compensation for leg length discrepancy.    Education Comments  No comments found.

## 2024-08-28 NOTE — CARE PLAN
The patient's goals for the shift include  pain free    The clinical goals for the shift includes safety  Problem: Skin  Goal: Decreased wound size/increased tissue granulation at next dressing change  Flowsheets (Taken 8/27/2024 2117)  Decreased wound size/increased tissue granulation at next dressing change:   Promote sleep for wound healing   Utilize specialty bed per algorithm   Protective dressings over bony prominences  Goal: Participates in plan/prevention/treatment measures  Flowsheets (Taken 8/27/2024 2117)  Participates in plan/prevention/treatment measures:   Discuss with provider PT/OT consult   Elevate heels   Increase activity/out of bed for meals  Goal: Prevent/manage excess moisture  Flowsheets (Taken 8/27/2024 2117)  Prevent/manage excess moisture:   Cleanse incontinence/protect with barrier cream   Moisturize dry skin   Use wicking fabric (obtain order)   Monitor for/manage infection if present   Follow provider orders for dressing changes  Goal: Prevent/minimize sheer/friction injuries  Flowsheets (Taken 8/27/2024 2117)  Prevent/minimize sheer/friction injuries:   Complete micro-shifts as needed if patient unable. Adjust patient position to relieve pressure points, not a full turn   Increase activity/out of bed for meals   Use pull sheet   Utilize specialty bed per algorithm   Turn/reposition every 2 hours/use positioning/transfer devices   HOB 30 degrees or less  Goal: Promote/optimize nutrition  Flowsheets (Taken 8/27/2024 2117)  Promote/optimize nutrition:   Assist with feeding   Monitor/record intake including meals   Offer water/supplements/favorite foods   Consume > 50% meals/supplements   Reassess MST if dietician not consulted   Discuss with provider if NPO > 2 days  Goal: Promote skin healing  Flowsheets (Taken 8/27/2024 2117)  Promote skin healing:   Assess skin/pad under line(s)/device(s)   Protective dressings over bony prominences   Turn/reposition every 2 hours/use  positioning/transfer devices   Rotate device position/do not position patient on device   Ensure correct size (line/device) and apply per  instructions

## 2024-08-28 NOTE — PROGRESS NOTES
"Mraco Antonio Merlos Jr. is a 79 y.o. male on day 0 of admission presenting with Benign prostatic hyperplasia with urinary retention.    Subjective   NAOE, no pain, feels ready to go home today       Objective     Physical Exam  Urine clear on slow CBI    Last Recorded Vitals  Blood pressure 111/70, pulse 86, temperature 36.8 °C (98.2 °F), resp. rate 18, height 1.651 m (5' 5\"), weight 70.1 kg (154 lb 8.7 oz), SpO2 98%.  Intake/Output last 3 Shifts:  I/O last 3 completed shifts:  In: 2455 (35 mL/kg) [P.O.:120; I.V.:2335 (33.3 mL/kg)]  Out: 1450 (20.7 mL/kg) [Urine:1400 (0.6 mL/kg/hr); Blood:50]  Weight: 70.1 kg     Relevant Results  Results for orders placed or performed during the hospital encounter of 08/27/24 (from the past 24 hour(s))   Comprehensive Metabolic Panel   Result Value Ref Range    Glucose 191 (H) 74 - 99 mg/dL    Sodium 135 (L) 136 - 145 mmol/L    Potassium 3.9 3.5 - 5.3 mmol/L    Chloride 103 98 - 107 mmol/L    Bicarbonate 24 21 - 32 mmol/L    Anion Gap 12 10 - 20 mmol/L    Urea Nitrogen 17 6 - 23 mg/dL    Creatinine 0.93 0.50 - 1.30 mg/dL    eGFR 82 >60 mL/min/1.73m*2    Calcium 8.1 (L) 8.6 - 10.3 mg/dL    Albumin 3.1 (L) 3.4 - 5.0 g/dL    Alkaline Phosphatase 74 33 - 136 U/L    Total Protein 5.6 (L) 6.4 - 8.2 g/dL    AST 12 9 - 39 U/L    Bilirubin, Total 0.4 0.0 - 1.2 mg/dL    ALT 7 (L) 10 - 52 U/L   CBC   Result Value Ref Range    WBC 9.2 4.4 - 11.3 x10*3/uL    nRBC 0.0 0.0 - 0.0 /100 WBCs    RBC 3.55 (L) 4.50 - 5.90 x10*6/uL    Hemoglobin 12.0 (L) 13.5 - 17.5 g/dL    Hematocrit 36.0 (L) 41.0 - 52.0 %     (H) 80 - 100 fL    MCH 33.8 26.0 - 34.0 pg    MCHC 33.3 32.0 - 36.0 g/dL    RDW 14.6 (H) 11.5 - 14.5 %    Platelets 360 150 - 450 x10*3/uL   CBC and Auto Differential   Result Value Ref Range    WBC 14.1 (H) 4.4 - 11.3 x10*3/uL    nRBC 0.0 0.0 - 0.0 /100 WBCs    RBC 2.88 (L) 4.50 - 5.90 x10*6/uL    Hemoglobin 9.5 (L) 13.5 - 17.5 g/dL    Hematocrit 28.7 (L) 41.0 - 52.0 %     80 - 100 fL "    MCH 33.0 26.0 - 34.0 pg    MCHC 33.1 32.0 - 36.0 g/dL    RDW 14.5 11.5 - 14.5 %    Platelets 320 150 - 450 x10*3/uL    Neutrophils % 83.7 40.0 - 80.0 %    Immature Granulocytes %, Automated 0.7 0.0 - 0.9 %    Lymphocytes % 7.5 13.0 - 44.0 %    Monocytes % 7.4 2.0 - 10.0 %    Eosinophils % 0.3 0.0 - 6.0 %    Basophils % 0.4 0.0 - 2.0 %    Neutrophils Absolute 11.81 (H) 1.60 - 5.50 x10*3/uL    Immature Granulocytes Absolute, Automated 0.10 0.00 - 0.50 x10*3/uL    Lymphocytes Absolute 1.06 0.80 - 3.00 x10*3/uL    Monocytes Absolute 1.05 (H) 0.05 - 0.80 x10*3/uL    Eosinophils Absolute 0.04 0.00 - 0.40 x10*3/uL    Basophils Absolute 0.05 0.00 - 0.10 x10*3/uL               This patient has a urinary catheter   Reason for the urinary catheter remaining today? Urine catheter unnecessary, will be removed today               Assessment/Plan   Assessment & Plan  Benign prostatic hyperplasia with urinary retention    BPH with urinary obstruction    POD1 s/p HoLEP, doing well. Urine clear. Feels ready for dc.    - TOV this AM, 100cc in balloon  - dc today, fu 2 weeks for PVR, symptom check       I spent 15 minutes in the professional and overall care of this patient.      Good Huffman MD

## 2024-08-28 NOTE — PROGRESS NOTES
Occupational Therapy    Evaluation    Patient Name: Marco Antonio Merlos Jr.  MRN: [id number]  Today's Date: 8/28/2024  Time Calculation  Start Time: 1404  Stop Time: 1422  Time Calculation (min): 18 min    Assessment  IP OT Assessment  OT Assessment:  (Patient is s/p transurethral prostate resection, with decreased standing tolerance, decreased ADLs and transfers. Patient would benefit from LOW intensity therapy after discharge.)  Prognosis: Good  Barriers to Discharge: Decreased caregiver support  Evaluation/Treatment Tolerance: Patient tolerated treatment well  Medical Staff Made Aware: Yes  End of Session Communication: Bedside nurse  End of Session Patient Position:  (EOB with son in room, about to be discharged.)  Plan:  Treatment Interventions: ADL retraining, Functional transfer training, Endurance training, Patient/family training, Neuromuscular reeducation  OT Frequency: 2 times per week  OT Discharge Recommendations: Low intensity level of continued care  Equipment Recommended upon Discharge:  (Hip Kit)  OT Recommended Transfer Status: Stand by assist  OT - OK to Discharge: Yes    Subjective   Current Problem:  1. Status post recent transurethral resection of prostate  Referral to Home Health      2. Benign prostatic hyperplasia with urinary retention  Surgical Pathology Exam    Surgical Pathology Exam        General:  General  Reason for Referral:  (78 y/o male with benign prosthetic hyperplasia with urinary retention, s/p transurethral resection of prostate.)  Referred By:  (Armida)  Past Medical History Relevant to Rehab:   Past Medical History:   Diagnosis Date    Neuropathy     Osteoarthritis     Parkinson disease (Multi)     RLS (restless legs syndrome)     Scoliosis     Seizure (Multi) 2020    1 episode, unknown cause    Spinal stenosis     Thromboembolism (Multi)     son denies - no history of blood clots    Urinary tract infection     admitted 7/2024 for complicated UTI and hematuria requiring cystoscopy  with cauterization.       Family/Caregiver Present: Yes  Caregiver Feedback:  (Son present)  Co-Treatment: PT  Co-Treatment Reason:  (To maximize patient participation.)  Prior to Session Communication: Bedside nurse  Patient Position Received: Bed, 0 rail up  General Comment:  (Patient with increased time and effort with mobility tasks, posterior lean when attempting to stand.)  Precautions:  Medical Precautions: Fall precautions    Pain:  Pain Assessment  Pain Assessment: 0-10  0-10 (Numeric) Pain Score: 0 - No pain    Objective   Cognition:  Overall Cognitive Status:  (Delayed Processing)  Orientation Level:  (Disoriented to place)     Home Living:  Type of Home: House (Ranch Home with a Ramp, bed/bath 1st floor, walk in shower, raised toilet seat.)  Lives With: Alone  Home Adaptive Equipment:  (Wheelchair, shower seat, grab bars.)   Prior Function:  Level of [place]: Independent with ADLs and functional transfers  Receives Help From: Family  ADL Assistance: Independent  Homemaking Assistance:  (Receives Meals on wheels for meals.)  Ambulatory Assistance: Independent (with walker)  Hand Dominance: Right    ADL:  Eating Assistance: Independent  Grooming Assistance: Minimal  Bathing Assistance: Moderate (Anticipated to bathe below knees.)  UE Dressing Assistance: Minimal  LE Dressing Assistance: Total (Unable to don/doff socks, patient states he uses a reacher.)  Toileting Assistance with Device: Moderate  Functional Assistance: Moderate  Activity Tolerance:  Endurance: Endurance does not limit participation in activity  Activity Tolerance Comments:  (Fair activity tolerance.)  Bed Mobility/Transfers: Bed Mobility  Bed Mobility: No    Transfers  Transfer: Yes  Transfer 1  Transfer From 1: Sit to  Transfer to 1: Stand  Technique 1: Sit to stand  Transfer Device 1: Walker  Transfer Level of Assistance 1: Close supervision  Trials/Comments 1:  (Cues for hand placement and sequencing.)    Sitting Balance:  Static  Sitting Balance  Static Sitting-Balance Support: Feet supported  Static Sitting-Level of Assistance: Independent  Standing Balance:  Static Standing Balance  Static Standing-Balance Support: Bilateral upper extremity supported  Static Standing-Level of Assistance: Close supervision  Vision: Vision - Basic Assessment  Current Vision: Wears glasses all the time  Sensation:  Light Touch: No apparent deficits  Strength:  Strength Comments:  (B UEs- 4/5 throughout)  Coordination:  Movements are Fluid and Coordinated: Yes   Hand Function:  Hand Function  Gross Grasp: Functional  Coordination: Functional    Outcome Measures: [facility] Daily Activity  Putting on and taking off regular lower body clothing: Total  Bathing (including washing, rinsing, drying): A lot  Putting on and taking off regular upper body clothing: A little  Toileting, which includes using toilet, bedpan or urinal: A little  Taking care of personal grooming such as brushing teeth: A little  Eating Meals: None  Daily Activity - Total Score: 16    Goals:   Encounter Problems       Encounter Problems (Active)       ADLs       Patient will perform UB and LB bathing with independent level of assistance.       Start:  08/28/24    Expected End:  09/11/24            Patient with complete upper body dressing with independent level of assistance donning and doffing all UE clothes with no adaptive equipment while edge of bed        Start:  08/28/24    Expected End:  09/11/24            Patient with complete lower body dressing with independent level of assistance donning and doffing all LE clothes  with reacher, sock-aid, and dressing stick  while edge of bed        Start:  08/28/24    Expected End:  09/11/24            Patient will complete daily grooming tasks brushing teeth and washing face/hair with independent level of assistance and PRN adaptive equipment while standing.       Start:  08/28/24    Expected End:  09/11/24            Patient will complete toileting  including hygiene clothing management/hygiene with independent level of assistance and raised toilet seat.       Start:  08/28/24    Expected End:  09/11/24               BALANCE       Pt will maintain dynamic standing balance during ADL task with independent level of assistance in order to demonstrate decreased risk of falling and improved postural control.       Start:  08/28/24    Expected End:  09/11/24               TRANSFERS       Patient will perform bed mobility independent level of assistance and bed rails in order to improve safety and independence with mobility       Start:  08/28/24    Expected End:  09/11/24            Patient will complete functional transfer to all surfaces with front wheeled walker with modified independent level of assistance.       Start:  08/28/24    Expected End:  09/11/24

## 2024-08-28 NOTE — PROGRESS NOTES
08/28/24 1031   Discharge Planning   Living Arrangements Alone   Support Systems Children   Assistance Needed none   Type of Residence Private residence   Number of Stairs to Enter Residence 4   Number of Stairs Within Residence 0   Do you have animals or pets at home? No   Who is requesting discharge planning? Provider   Home or Post Acute Services In home services   Type of Home Care Services Home nursing visits;Home PT;Home OT;Home SLP   Expected Discharge Disposition  Services   Does the patient need discharge transport arranged? No   Financial Resource Strain   How hard is it for you to pay for the very basics like food, housing, medical care, and heating? Not hard   Housing Stability   In the last 12 months, was there a time when you were not able to pay the mortgage or rent on time? N   In the past 12 months, how many times have you moved where you were living? 0   At any time in the past 12 months, were you homeless or living in a shelter (including now)? N   Transportation Needs   In the past 12 months, has lack of transportation kept you from medical appointments or from getting medications? no   In the past 12 months, has lack of transportation kept you from meetings, work, or from getting things needed for daily living? No   Patient Choice   Provider Choice list and CMS website (https://medicare.gov/care-compare#search) for post-acute Quality and Resource Measure Data were provided and reviewed with: Patient   Patient / Family choosing to utilize agency / facility established prior to hospitalization Yes     8/28/24 1033  Met with patient at bedside to discuss discharge planning.  Patient lives at home alone in a house.  He is independent with ADLs.  His son helps with IADLs and transportation.  He ambulates with a walker.  He is active with [facility] for PT/OT/ST/SN.  He would like to resume those services at discharge.  Plan is for patient to discharge home today after voiding trial.  Bell removed  this AM.  Son will  at discharge.  Vania Boss RN TCC

## 2024-08-28 NOTE — CARE PLAN
The patient's goals for the shift include      The clinical goals for the shift include maintain comfort and safety throughout shift      Problem: Skin  Goal: Decreased wound size/increased tissue granulation at next dressing change  Outcome: Progressing  Goal: Participates in plan/prevention/treatment measures  Outcome: Progressing  Goal: Prevent/manage excess moisture  Outcome: Progressing  Flowsheets (Taken 8/28/2024 8189)  Prevent/manage excess moisture: Moisturize dry skin  Goal: Prevent/minimize sheer/friction injuries  Outcome: Progressing  Goal: Promote/optimize nutrition  Outcome: Progressing  Goal: Promote skin healing  Outcome: Progressing     Problem: Pain - Adult  Goal: Verbalizes/displays adequate comfort level or baseline comfort level  Outcome: Progressing     Problem: Safety - Adult  Goal: Free from fall injury  Outcome: Progressing     Problem: Discharge Planning  Goal: Discharge to home or other facility with appropriate resources  Outcome: Progressing     Problem: Chronic Conditions and Co-morbidities  Goal: Patient's chronic conditions and co-morbidity symptoms are monitored and maintained or improved  Outcome: Progressing

## 2024-08-29 ENCOUNTER — APPOINTMENT (OUTPATIENT)
Dept: UROLOGY | Facility: CLINIC | Age: 79
End: 2024-08-29
Payer: MEDICARE

## 2024-08-29 ENCOUNTER — DOCUMENTATION (OUTPATIENT)
Dept: PRIMARY CARE | Facility: CLINIC | Age: 79
End: 2024-08-29

## 2024-08-29 ENCOUNTER — HOME CARE VISIT (OUTPATIENT)
Dept: HOME HEALTH SERVICES | Facility: HOME HEALTH | Age: 79
End: 2024-08-29
Payer: MEDICARE

## 2024-08-29 VITALS
TEMPERATURE: 97.9 F | DIASTOLIC BLOOD PRESSURE: 70 MMHG | HEART RATE: 69 BPM | RESPIRATION RATE: 14 BRPM | OXYGEN SATURATION: 99 % | SYSTOLIC BLOOD PRESSURE: 118 MMHG

## 2024-08-29 PROCEDURE — G0151 HHCP-SERV OF PT,EA 15 MIN: HCPCS | Mod: HHH

## 2024-08-29 SDOH — HEALTH STABILITY: PHYSICAL HEALTH

## 2024-08-29 SDOH — HEALTH STABILITY: PHYSICAL HEALTH: EXERCISE ACTIVITY: LAQ

## 2024-08-29 SDOH — HEALTH STABILITY: PHYSICAL HEALTH: EXERCISE TYPE: LE EXERCISES

## 2024-08-29 SDOH — HEALTH STABILITY: PHYSICAL HEALTH: EXERCISE ACTIVITY: HS CURLS

## 2024-08-29 SDOH — HEALTH STABILITY: PHYSICAL HEALTH: PHYSICAL EXERCISE: SEATED

## 2024-08-29 SDOH — HEALTH STABILITY: PHYSICAL HEALTH: RESISTANCE: BLUE THERABAND

## 2024-08-29 SDOH — HEALTH STABILITY: PHYSICAL HEALTH: EXERCISE ACTIVITIES SETS: 1

## 2024-08-29 SDOH — HEALTH STABILITY: PHYSICAL HEALTH: PHYSICAL EXERCISE: 15

## 2024-08-29 SDOH — HEALTH STABILITY: PHYSICAL HEALTH: EXERCISE ACTIVITY: ANKLE DF/PF

## 2024-08-29 SDOH — HEALTH STABILITY: PHYSICAL HEALTH: EXERCISE ACTIVITY: MARCHING

## 2024-08-29 SDOH — HEALTH STABILITY: PHYSICAL HEALTH: EXERCISE ACTIVITY: HIP ABDUCTION

## 2024-08-29 SDOH — HEALTH STABILITY: PHYSICAL HEALTH: EXERCISE ACTIVITY: HIP EXTENSION

## 2024-08-29 ASSESSMENT — GAIT ASSESSMENTS
PATH: 0 - MARKED DEVIATION
STEP SYMMETRY: 1 - RIGHT AND LEFT STEP LENGTH APPEAR EQUAL
TRUNK: 0 - MARKED SWAY OR USES WALKING AID
INITIATION OF GAIT IMMEDIATELY AFTER GO: 1 - NO HESITANCY
BALANCE AND GAIT SCORE: 13
PATH SCORE: 0
WALKING STANCE: 1 - HEELS ALMOST TOUCHING WHILE WALKING
STEP CONTINUITY: 1 - STEPS APPEAR CONTINUOUS
GAIT SCORE: 6
TRUNK SCORE: 0

## 2024-08-29 ASSESSMENT — BALANCE ASSESSMENTS
NUDGED: 0 - BEGINS TO FALL
SITTING DOWN: 1 - USES ARMS OR NOT SMOOTH MOTION
ARISES: 1 - ABLE, USES ARMS TO HELP
BALANCE SCORE: 7
EYES CLOSED AT MAXIMUM POSITION NUDGED: 0 - UNSTEADY
TURNING 360 DEGREES STEPS: 0 - DISCONTINUOUS STEPS
ARISING SCORE: 1
STANDING BALANCE: 1 - STEADY BUT WIDE STANCE AND USES CANE OR OTHER SUPPORT
SITTING BALANCE: 1 - STEADY, SAFE
IMMEDIATE STANDING BALANCE FIRST 5 SECONDS: 1 - STEADY BUT USES WALKER OR OTHER SUPPORT
ATTEMPTS TO ARISE: 2 - ABLE TO RISE, ONE ATTEMPT
NUDGED SCORE: 0

## 2024-08-29 ASSESSMENT — ENCOUNTER SYMPTOMS
PERSON REPORTING PAIN: PATIENT
OCCASIONAL FEELINGS OF UNSTEADINESS: 1
DENIES PAIN: 1
MUSCLE WEAKNESS: 1

## 2024-08-29 ASSESSMENT — ACTIVITIES OF DAILY LIVING (ADL)
AMBULATION ASSISTANCE: STAND BY ASSIST
CURRENT_FUNCTION: STAND BY ASSIST
AMBULATION ASSISTANCE ON FLAT SURFACES: 1
AMBULATION_DISTANCE/DURATION_TOLERATED: 50 FT

## 2024-08-30 ENCOUNTER — PATIENT OUTREACH (OUTPATIENT)
Dept: PRIMARY CARE | Facility: CLINIC | Age: 79
End: 2024-08-30
Payer: MEDICARE

## 2024-09-03 ENCOUNTER — HOME CARE VISIT (OUTPATIENT)
Dept: HOME HEALTH SERVICES | Facility: HOME HEALTH | Age: 79
End: 2024-09-03
Payer: MEDICARE

## 2024-09-03 VITALS
RESPIRATION RATE: 14 BRPM | SYSTOLIC BLOOD PRESSURE: 116 MMHG | DIASTOLIC BLOOD PRESSURE: 70 MMHG | TEMPERATURE: 97.7 F | OXYGEN SATURATION: 99 % | HEART RATE: 96 BPM

## 2024-09-03 PROCEDURE — G0151 HHCP-SERV OF PT,EA 15 MIN: HCPCS | Mod: HHH

## 2024-09-03 SDOH — HEALTH STABILITY: PHYSICAL HEALTH: PHYSICAL EXERCISE: 15

## 2024-09-03 SDOH — HEALTH STABILITY: PHYSICAL HEALTH: PHYSICAL EXERCISE: SEATED

## 2024-09-03 SDOH — HEALTH STABILITY: PHYSICAL HEALTH: EXERCISE ACTIVITIES SETS: 1

## 2024-09-03 SDOH — HEALTH STABILITY: PHYSICAL HEALTH

## 2024-09-03 SDOH — HEALTH STABILITY: PHYSICAL HEALTH: EXERCISE ACTIVITY: ANKLE DF/PF

## 2024-09-03 SDOH — HEALTH STABILITY: PHYSICAL HEALTH: RESISTANCE: ORANGE THERABAND

## 2024-09-03 SDOH — HEALTH STABILITY: PHYSICAL HEALTH: EXERCISE ACTIVITY: MARCHING

## 2024-09-03 SDOH — HEALTH STABILITY: PHYSICAL HEALTH: EXERCISE ACTIVITY: HIP EXTENSION

## 2024-09-03 SDOH — HEALTH STABILITY: PHYSICAL HEALTH: EXERCISE ACTIVITY: HIP ABDUCTION

## 2024-09-03 SDOH — HEALTH STABILITY: PHYSICAL HEALTH: EXERCISE ACTIVITY: HS CURLS

## 2024-09-03 SDOH — HEALTH STABILITY: PHYSICAL HEALTH: EXERCISE TYPE: LE EXERCISES

## 2024-09-03 SDOH — HEALTH STABILITY: PHYSICAL HEALTH: EXERCISE ACTIVITY: LAQ

## 2024-09-03 ASSESSMENT — ENCOUNTER SYMPTOMS
MUSCLE WEAKNESS: 1
PERSON REPORTING PAIN: PATIENT
DENIES PAIN: 1

## 2024-09-05 ENCOUNTER — HOME CARE VISIT (OUTPATIENT)
Dept: HOME HEALTH SERVICES | Facility: HOME HEALTH | Age: 79
End: 2024-09-05
Payer: MEDICARE

## 2024-09-05 VITALS
DIASTOLIC BLOOD PRESSURE: 72 MMHG | RESPIRATION RATE: 14 BRPM | HEART RATE: 85 BPM | SYSTOLIC BLOOD PRESSURE: 122 MMHG | OXYGEN SATURATION: 99 % | TEMPERATURE: 97.6 F

## 2024-09-05 PROCEDURE — G0151 HHCP-SERV OF PT,EA 15 MIN: HCPCS | Mod: HHH

## 2024-09-05 SDOH — HEALTH STABILITY: PHYSICAL HEALTH: EXERCISE ACTIVITIES SETS: 1

## 2024-09-05 SDOH — HEALTH STABILITY: PHYSICAL HEALTH: PHYSICAL EXERCISE: STANDING

## 2024-09-05 SDOH — HEALTH STABILITY: PHYSICAL HEALTH: EXERCISE ACTIVITY: HS CURLS

## 2024-09-05 SDOH — HEALTH STABILITY: PHYSICAL HEALTH: PHYSICAL EXERCISE: 15

## 2024-09-05 SDOH — HEALTH STABILITY: PHYSICAL HEALTH: EXERCISE ACTIVITY: HIP ABDUCTION

## 2024-09-05 SDOH — HEALTH STABILITY: PHYSICAL HEALTH: EXERCISE ACTIVITY: HIP EXTENSION

## 2024-09-05 SDOH — HEALTH STABILITY: PHYSICAL HEALTH: EXERCISE ACTIVITY: MINI SQUATS

## 2024-09-05 SDOH — HEALTH STABILITY: PHYSICAL HEALTH: EXERCISE ACTIVITY: MARCHING

## 2024-09-05 SDOH — HEALTH STABILITY: PHYSICAL HEALTH: EXERCISE ACTIVITY: HEEL RAISES/TOE RAISES

## 2024-09-05 SDOH — HEALTH STABILITY: PHYSICAL HEALTH: EXERCISE TYPE: LE EXERCISES

## 2024-09-05 ASSESSMENT — ACTIVITIES OF DAILY LIVING (ADL)
AMBULATION_DISTANCE/DURATION_TOLERATED: 120 FT X 2
AMBULATION ASSISTANCE ON FLAT SURFACES: 1

## 2024-09-05 ASSESSMENT — ENCOUNTER SYMPTOMS
MUSCLE WEAKNESS: 1
PERSON REPORTING PAIN: PATIENT
DENIES PAIN: 1

## 2024-09-07 ENCOUNTER — HOME CARE VISIT (OUTPATIENT)
Dept: HOME HEALTH SERVICES | Facility: HOME HEALTH | Age: 79
End: 2024-09-07
Payer: MEDICARE

## 2024-09-09 LAB
LABORATORY COMMENT REPORT: NORMAL
PATH REPORT.FINAL DX SPEC: NORMAL
PATH REPORT.GROSS SPEC: NORMAL
PATH REPORT.RELEVANT HX SPEC: NORMAL
PATH REPORT.TOTAL CANCER: NORMAL

## 2024-09-10 ENCOUNTER — HOME CARE VISIT (OUTPATIENT)
Dept: HOME HEALTH SERVICES | Facility: HOME HEALTH | Age: 79
End: 2024-09-10
Payer: MEDICARE

## 2024-09-10 VITALS
HEART RATE: 67 BPM | SYSTOLIC BLOOD PRESSURE: 104 MMHG | TEMPERATURE: 97.3 F | OXYGEN SATURATION: 99 % | RESPIRATION RATE: 14 BRPM | DIASTOLIC BLOOD PRESSURE: 56 MMHG

## 2024-09-10 PROCEDURE — G0151 HHCP-SERV OF PT,EA 15 MIN: HCPCS | Mod: HHH

## 2024-09-10 SDOH — HEALTH STABILITY: PHYSICAL HEALTH: EXERCISE ACTIVITY: MARCHING

## 2024-09-10 SDOH — HEALTH STABILITY: PHYSICAL HEALTH: EXERCISE ACTIVITIES SETS: 1

## 2024-09-10 SDOH — HEALTH STABILITY: PHYSICAL HEALTH: PHYSICAL EXERCISE: 15

## 2024-09-10 SDOH — HEALTH STABILITY: PHYSICAL HEALTH: PHYSICAL EXERCISE: STANDING

## 2024-09-10 SDOH — HEALTH STABILITY: PHYSICAL HEALTH: EXERCISE ACTIVITY: HS CURLS

## 2024-09-10 SDOH — HEALTH STABILITY: PHYSICAL HEALTH: EXERCISE ACTIVITY: MINI SQUATS

## 2024-09-10 SDOH — HEALTH STABILITY: PHYSICAL HEALTH: EXERCISE TYPE: LE EXERCISES

## 2024-09-10 SDOH — HEALTH STABILITY: PHYSICAL HEALTH: EXERCISE ACTIVITY: HEEL RAISES/TOE RAISES

## 2024-09-10 SDOH — HEALTH STABILITY: PHYSICAL HEALTH: EXERCISE ACTIVITY: HIP ABDUCTION

## 2024-09-10 SDOH — HEALTH STABILITY: PHYSICAL HEALTH: EXERCISE ACTIVITY: HIP EXTENSION

## 2024-09-10 ASSESSMENT — ENCOUNTER SYMPTOMS
MUSCLE WEAKNESS: 1
PERSON REPORTING PAIN: PATIENT
DENIES PAIN: 1

## 2024-09-10 ASSESSMENT — ACTIVITIES OF DAILY LIVING (ADL)
AMBULATION_DISTANCE/DURATION_TOLERATED: 125FT X 2
AMBULATION ASSISTANCE: STAND BY ASSIST
AMBULATION ASSISTANCE ON FLAT SURFACES: 1

## 2024-09-11 ENCOUNTER — PATIENT OUTREACH (OUTPATIENT)
Dept: PRIMARY CARE | Facility: CLINIC | Age: 79
End: 2024-09-11
Payer: MEDICARE

## 2024-09-11 NOTE — PROGRESS NOTES
Call regarding 14 days after hospitalization. No PCP visit at this time.  At time of outreach call the patient feels as if their condition has improved since discharge.  No questions or concerns at this time.

## 2024-09-12 ENCOUNTER — HOME CARE VISIT (OUTPATIENT)
Dept: HOME HEALTH SERVICES | Facility: HOME HEALTH | Age: 79
End: 2024-09-12
Payer: MEDICARE

## 2024-09-12 ENCOUNTER — OFFICE VISIT (OUTPATIENT)
Dept: UROLOGY | Facility: HOSPITAL | Age: 79
End: 2024-09-12
Payer: MEDICARE

## 2024-09-12 VITALS
OXYGEN SATURATION: 99 % | DIASTOLIC BLOOD PRESSURE: 60 MMHG | HEART RATE: 85 BPM | SYSTOLIC BLOOD PRESSURE: 128 MMHG | RESPIRATION RATE: 14 BRPM | TEMPERATURE: 97.9 F

## 2024-09-12 DIAGNOSIS — N31.9 NEUROGENIC BLADDER: ICD-10-CM

## 2024-09-12 DIAGNOSIS — R33.8 BENIGN PROSTATIC HYPERPLASIA WITH URINARY RETENTION: Primary | ICD-10-CM

## 2024-09-12 DIAGNOSIS — G20.B2 PARKINSON'S DISEASE WITH DYSKINESIA AND FLUCTUATING MANIFESTATIONS (MULTI): ICD-10-CM

## 2024-09-12 DIAGNOSIS — N40.1 BENIGN PROSTATIC HYPERPLASIA WITH URINARY RETENTION: Primary | ICD-10-CM

## 2024-09-12 PROCEDURE — 99214 OFFICE O/P EST MOD 30 MIN: CPT | Performed by: UROLOGY

## 2024-09-12 PROCEDURE — 1157F ADVNC CARE PLAN IN RCRD: CPT | Performed by: UROLOGY

## 2024-09-12 PROCEDURE — G0151 HHCP-SERV OF PT,EA 15 MIN: HCPCS | Mod: HHH

## 2024-09-12 PROCEDURE — 51798 US URINE CAPACITY MEASURE: CPT | Performed by: UROLOGY

## 2024-09-12 PROCEDURE — 99214 OFFICE O/P EST MOD 30 MIN: CPT | Mod: 24 | Performed by: UROLOGY

## 2024-09-12 RX ORDER — MIRABEGRON 25 MG/1
25 TABLET, FILM COATED, EXTENDED RELEASE ORAL DAILY
Status: CANCELLED | OUTPATIENT
Start: 2024-09-12

## 2024-09-12 RX ORDER — MIRABEGRON 25 MG/1
25 TABLET, FILM COATED, EXTENDED RELEASE ORAL DAILY
Qty: 30 TABLET | Refills: 11 | Status: SHIPPED | OUTPATIENT
Start: 2024-09-12 | End: 2025-09-12

## 2024-09-12 SDOH — HEALTH STABILITY: PHYSICAL HEALTH: PHYSICAL EXERCISE: SEATED

## 2024-09-12 SDOH — HEALTH STABILITY: PHYSICAL HEALTH: EXERCISE ACTIVITIES SETS: 1

## 2024-09-12 SDOH — HEALTH STABILITY: PHYSICAL HEALTH: EXERCISE ACTIVITY: HIP ABDUCTION

## 2024-09-12 SDOH — HEALTH STABILITY: PHYSICAL HEALTH: PHYSICAL EXERCISE: 15

## 2024-09-12 SDOH — HEALTH STABILITY: PHYSICAL HEALTH: EXERCISE TYPE: LE EXERCISES

## 2024-09-12 SDOH — HEALTH STABILITY: PHYSICAL HEALTH: RESISTANCE: ORANGE THERABAND

## 2024-09-12 SDOH — HEALTH STABILITY: PHYSICAL HEALTH: EXERCISE ACTIVITY: ANKLE DF/PF

## 2024-09-12 SDOH — HEALTH STABILITY: PHYSICAL HEALTH: EXERCISE ACTIVITY: HIP EXTENSION

## 2024-09-12 SDOH — HEALTH STABILITY: PHYSICAL HEALTH

## 2024-09-12 SDOH — HEALTH STABILITY: PHYSICAL HEALTH: EXERCISE ACTIVITY: LAQ

## 2024-09-12 SDOH — HEALTH STABILITY: PHYSICAL HEALTH: EXERCISE ACTIVITY: MARCHING

## 2024-09-12 SDOH — HEALTH STABILITY: PHYSICAL HEALTH: EXERCISE ACTIVITY: HS CURLS

## 2024-09-12 ASSESSMENT — ENCOUNTER SYMPTOMS
MUSCLE WEAKNESS: 1
DENIES PAIN: 1
PERSON REPORTING PAIN: PATIENT

## 2024-09-12 ASSESSMENT — ACTIVITIES OF DAILY LIVING (ADL)
AMBULATION ASSISTANCE ON FLAT SURFACES: 1
AMBULATION_DISTANCE/DURATION_TOLERATED: 100 FT

## 2024-09-12 NOTE — PROGRESS NOTES
HPI    79 y.o. male being seen with the following problem list:    Problem list:  BPH with bothersome LUTs  PD    Path 160g, benign     He has Parkinson's disease. For his urinary symptoms he is on doxazosin, Flomax, finasteride. He is on Eliquis     Prostate vol (calc from CT pelvis 2022) - 248cc     Referred by Dr. Faulkner for bothersome urinary symptoms and concerns for prostate cancer. Last PSA 9/2021 was 16.3 (32 corrected for finasteride). From a urinary standpoint he has urgency, frequency, urge incontinence. Urination is sporadic. He is overall not too bothered with his urination. He wears depends. No prior prostate biopsy. Spot bladder scan today was 67cc and he voided about 3 hrs ago     PSA 8/3/23 - 8.57     9/22/23 - seen today in follow up for repeat PSA. Myrbetriq has not improved his urinary symptoms.      Patient admitted at [facility] with SUBHASH and bilateral hydro on 5/29/24. Bell placed      7/9/24 - cysto, clot evac with Dr. Gupta     08/07/24 -seen back in follow-up.  Miserable with the catheter.  He is having to have nurse visit several times a week in order to deal with catheter clogging and hematuria.  He has had procedures as above to deal with complications of hematuria.  He would like to do like to get rid of the catheter and the recurrent bleeding.    09/12/24 - PVR 111cc. Voided a couple hours ago Doing well, urinary symptoms have improved. He is unable to hold his urine. Overall thrilled with the outcome of his procedure.     Lab Results   Component Value Date    PSA 8.57 (H) 08/03/2023    PSA 16.30 (H) 09/29/2021         Current Medications:  Current Outpatient Medications   Medication Sig Dispense Refill    DULoxetine (Cymbalta) 30 mg DR capsule Take 1 capsule (30 mg) by mouth once daily for 7 days, THEN 2 capsules (60 mg) once daily. Do not crush or chew.. (Patient not taking: Reported on 8/12/2024) 30 capsule 2    finasteride (Proscar) 5 mg tablet Take 1 tablet (5 mg) by mouth once  daily. 90 tablet 3    magnesium oxide (Mag-Ox) 400 mg (241.3 mg magnesium) tablet Take 1 tablet (400 mg) by mouth once daily. (Patient taking differently: Take 1 tablet (400 mg) by mouth if needed.) 90 tablet 2    tamsulosin (Flomax) 0.4 mg 24 hr capsule Take 1 capsule (0.4 mg) by mouth once daily. 90 capsule 3     No current facility-administered medications for this visit.        Active Problems:  Marco Antonio Merlos Jr. is a 79 y.o. male with the following Problems and Medications.  Patient Active Problem List   Diagnosis    Abnormal serum protein test    Abrasion    Anemia    Acute prostatitis    Urinary urgency    Thromboembolism (Multi)    Stenosis, cervical spine    Spinal stenosis, lumbar region without neurogenic claudication    Scoliosis, unspecified    Right shoulder pain    Rib pain on right side    Restless legs syndrome    Parkinson disease (Multi)    Primary osteoarthritis of right knee    Nocturia    Multiple rib fractures    Myelomalacia (Multi)    Fracture of rib of left side    Low iron    Back pain    Lipoma    Left leg weakness    Left leg numbness    Left ankle pain    Joint pain, knee    Hyperreflexia    Status post total knee replacement    Gait instability    Gait disturbance    Fatigue    Elevated PSA    Elevated d-dimer    Dependence on wheelchair    Cervical spondylosis with myelopathy    Cellulitis    Abnormal CT scan, esophagus    Dysphagia    Hip fracture, left (Multi)    Quadriceps weakness    Lumbar spinal stenosis    Acute respiratory failure with hypoxia (Multi)    Coagulation defect, unspecified (Multi)    Gross hematuria     Current Outpatient Medications   Medication Sig Dispense Refill    DULoxetine (Cymbalta) 30 mg DR capsule Take 1 capsule (30 mg) by mouth once daily for 7 days, THEN 2 capsules (60 mg) once daily. Do not crush or chew.. (Patient not taking: Reported on 8/12/2024) 30 capsule 2    finasteride (Proscar) 5 mg tablet Take 1 tablet (5 mg) by mouth once daily. 90 tablet  3    magnesium oxide (Mag-Ox) 400 mg (241.3 mg magnesium) tablet Take 1 tablet (400 mg) by mouth once daily. (Patient taking differently: Take 1 tablet (400 mg) by mouth if needed.) 90 tablet 2    tamsulosin (Flomax) 0.4 mg 24 hr capsule Take 1 capsule (0.4 mg) by mouth once daily. 90 capsule 3     No current facility-administered medications for this visit.       PMH:  Past Medical History:   Diagnosis Date    Neuropathy     Osteoarthritis     Parkinson disease (Multi)     RLS (restless legs syndrome)     Scoliosis     Seizure (Multi) 2020    1 episode, unknown cause    Spinal stenosis     Thromboembolism (Multi)     son denies - no history of blood clots    Urinary tract infection     admitted 7/2024 for complicated UTI and hematuria requiring cystoscopy with cauterization.       PSH:  Past Surgical History:   Procedure Laterality Date    APPENDECTOMY      COLONOSCOPY      CYSTOSCOPY      with cauterization    HIP FRACTURE SURGERY  2022    SPINE SURGERY      TOTAL KNEE ARTHROPLASTY Right 2022    TOTAL KNEE ARTHROPLASTY Left        FMH:  Family History   Problem Relation Name Age of Onset    Valvular heart disease Father      Other (sepsis) Sister      Bone cancer Sister         SHx:  Social History     Tobacco Use    Smoking status: Never    Smokeless tobacco: Never   Vaping Use    Vaping status: Never Used   Substance Use Topics    Alcohol use: Never    Drug use: Never       Allergies:  No Known Allergies    Assessment/Plan  About 2 weeks s/p HoLEP, doing well. Emptying well, thrilled to be catheter free. As expected with his advanced PD, having issues with control. This was expected as discussed prior to procedure.  Discussed trial of  Myrbetriq 25 for his symptoms. He agrees with the plan. Would avoid anticholinergics due to fall risk, risk of mental confusion. Better control may or may not be possible, but will certainly try.    Will follow up in 6 weeks over TH.     Scribe Attestation  By signing my name  below, I, Mark Burk, attest that this documentation  has been prepared under the direction and in the presence of Good Huffman MD.

## 2024-09-13 ENCOUNTER — HOME CARE VISIT (OUTPATIENT)
Dept: HOME HEALTH SERVICES | Facility: HOME HEALTH | Age: 79
End: 2024-09-13
Payer: MEDICARE

## 2024-09-13 PROCEDURE — G0299 HHS/HOSPICE OF RN EA 15 MIN: HCPCS | Mod: HHH

## 2024-09-13 ASSESSMENT — ENCOUNTER SYMPTOMS
LAST BOWEL MOVEMENT: 67093
APPETITE LEVEL: GOOD
DENIES PAIN: 1
CHANGE IN APPETITE: UNCHANGED
STOOL FREQUENCY: LESS THAN DAILY
CONSTIPATION: 1
MUSCLE WEAKNESS: 1
HYPERTENSION: 1
PERSON REPORTING PAIN: PATIENT
LIMITED RANGE OF MOTION: 1

## 2024-09-13 ASSESSMENT — ACTIVITIES OF DAILY LIVING (ADL)
CURRENT_FUNCTION: STAND BY ASSIST
AMBULATION ASSISTANCE: STAND BY ASSIST

## 2024-09-17 ENCOUNTER — HOME CARE VISIT (OUTPATIENT)
Dept: HOME HEALTH SERVICES | Facility: HOME HEALTH | Age: 79
End: 2024-09-17
Payer: MEDICARE

## 2024-09-17 VITALS
SYSTOLIC BLOOD PRESSURE: 124 MMHG | HEART RATE: 84 BPM | DIASTOLIC BLOOD PRESSURE: 68 MMHG | TEMPERATURE: 97.4 F | OXYGEN SATURATION: 99 % | RESPIRATION RATE: 14 BRPM

## 2024-09-17 PROCEDURE — G0151 HHCP-SERV OF PT,EA 15 MIN: HCPCS | Mod: HHH

## 2024-09-17 SDOH — HEALTH STABILITY: PHYSICAL HEALTH: PHYSICAL EXERCISE: STANDING

## 2024-09-17 SDOH — HEALTH STABILITY: PHYSICAL HEALTH: EXERCISE ACTIVITY: HEEL RAISES/TOE RAISES

## 2024-09-17 SDOH — HEALTH STABILITY: PHYSICAL HEALTH: PHYSICAL EXERCISE: 15

## 2024-09-17 SDOH — HEALTH STABILITY: PHYSICAL HEALTH: EXERCISE ACTIVITY: HIP EXTENSION

## 2024-09-17 SDOH — HEALTH STABILITY: PHYSICAL HEALTH: EXERCISE ACTIVITIES SETS: 1

## 2024-09-17 SDOH — HEALTH STABILITY: PHYSICAL HEALTH: EXERCISE ACTIVITY: HS CURLS

## 2024-09-17 SDOH — HEALTH STABILITY: PHYSICAL HEALTH: EXERCISE TYPE: LE EXERCISES

## 2024-09-17 SDOH — HEALTH STABILITY: PHYSICAL HEALTH: EXERCISE ACTIVITY: MINI SQUATS

## 2024-09-17 SDOH — HEALTH STABILITY: PHYSICAL HEALTH: EXERCISE ACTIVITY: MARCHING

## 2024-09-17 SDOH — HEALTH STABILITY: PHYSICAL HEALTH: EXERCISE ACTIVITY: HIP ABDUCTION

## 2024-09-17 ASSESSMENT — ENCOUNTER SYMPTOMS
PERSON REPORTING PAIN: PATIENT
DENIES PAIN: 1
MUSCLE WEAKNESS: 1

## 2024-09-17 ASSESSMENT — ACTIVITIES OF DAILY LIVING (ADL)
AMBULATION ASSISTANCE: STAND BY ASSIST
AMBULATION ASSISTANCE ON FLAT SURFACES: 1
AMBULATION_DISTANCE/DURATION_TOLERATED: 125 FT X 2

## 2024-09-19 ENCOUNTER — HOME CARE VISIT (OUTPATIENT)
Dept: HOME HEALTH SERVICES | Facility: HOME HEALTH | Age: 79
End: 2024-09-19
Payer: MEDICARE

## 2024-09-19 VITALS
HEART RATE: 85 BPM | OXYGEN SATURATION: 99 % | TEMPERATURE: 97.8 F | DIASTOLIC BLOOD PRESSURE: 70 MMHG | RESPIRATION RATE: 14 BRPM | SYSTOLIC BLOOD PRESSURE: 132 MMHG

## 2024-09-19 PROCEDURE — G0151 HHCP-SERV OF PT,EA 15 MIN: HCPCS | Mod: HHH

## 2024-09-19 SDOH — HEALTH STABILITY: PHYSICAL HEALTH: PHYSICAL EXERCISE: 15

## 2024-09-19 SDOH — HEALTH STABILITY: PHYSICAL HEALTH: EXERCISE ACTIVITIES SETS: 1

## 2024-09-19 SDOH — HEALTH STABILITY: PHYSICAL HEALTH: PHYSICAL EXERCISE: STANDING

## 2024-09-19 SDOH — HEALTH STABILITY: PHYSICAL HEALTH: EXERCISE ACTIVITY: HEEL RAISES/TOE RAISES

## 2024-09-19 SDOH — HEALTH STABILITY: PHYSICAL HEALTH: EXERCISE ACTIVITY: HIP EXTENSION

## 2024-09-19 SDOH — HEALTH STABILITY: PHYSICAL HEALTH: EXERCISE ACTIVITY: HS CURLS

## 2024-09-19 SDOH — HEALTH STABILITY: PHYSICAL HEALTH: EXERCISE ACTIVITY: MARCHING

## 2024-09-19 SDOH — HEALTH STABILITY: PHYSICAL HEALTH: EXERCISE ACTIVITY: HIP ABDUCTION

## 2024-09-19 SDOH — HEALTH STABILITY: PHYSICAL HEALTH: EXERCISE TYPE: LE EXERCISES

## 2024-09-19 SDOH — HEALTH STABILITY: PHYSICAL HEALTH: EXERCISE ACTIVITY: MINI SQUATS

## 2024-09-19 ASSESSMENT — ACTIVITIES OF DAILY LIVING (ADL)
AMBULATION ASSISTANCE ON FLAT SURFACES: 1
AMBULATION ASSISTANCE: STAND BY ASSIST
AMBULATION_DISTANCE/DURATION_TOLERATED: 125 FT X 2

## 2024-09-19 ASSESSMENT — ENCOUNTER SYMPTOMS
DENIES PAIN: 1
MUSCLE WEAKNESS: 1
PERSON REPORTING PAIN: PATIENT

## 2024-09-20 ENCOUNTER — APPOINTMENT (OUTPATIENT)
Dept: UROLOGY | Facility: HOSPITAL | Age: 79
End: 2024-09-20
Payer: MEDICARE

## 2024-09-20 ENCOUNTER — APPOINTMENT (OUTPATIENT)
Dept: UROLOGY | Facility: CLINIC | Age: 79
End: 2024-09-20
Payer: MEDICARE

## 2024-09-24 ENCOUNTER — HOME CARE VISIT (OUTPATIENT)
Dept: HOME HEALTH SERVICES | Facility: HOME HEALTH | Age: 79
End: 2024-09-24
Payer: MEDICARE

## 2024-09-24 VITALS
TEMPERATURE: 98 F | OXYGEN SATURATION: 99 % | DIASTOLIC BLOOD PRESSURE: 72 MMHG | HEART RATE: 95 BPM | RESPIRATION RATE: 14 BRPM | SYSTOLIC BLOOD PRESSURE: 118 MMHG

## 2024-09-24 PROCEDURE — G0151 HHCP-SERV OF PT,EA 15 MIN: HCPCS | Mod: HHH

## 2024-09-24 SDOH — HEALTH STABILITY: PHYSICAL HEALTH: EXERCISE TYPE: LE EXERCISES

## 2024-09-24 SDOH — HEALTH STABILITY: PHYSICAL HEALTH: EXERCISE ACTIVITY: MARCHING

## 2024-09-24 SDOH — HEALTH STABILITY: PHYSICAL HEALTH: PHYSICAL EXERCISE: 15

## 2024-09-24 SDOH — HEALTH STABILITY: PHYSICAL HEALTH: PHYSICAL EXERCISE: STANDING

## 2024-09-24 SDOH — HEALTH STABILITY: PHYSICAL HEALTH: EXERCISE ACTIVITIES SETS: 1

## 2024-09-24 SDOH — HEALTH STABILITY: PHYSICAL HEALTH: EXERCISE ACTIVITY: HS CURLS

## 2024-09-24 SDOH — HEALTH STABILITY: PHYSICAL HEALTH: EXERCISE ACTIVITY: HIP ABDUCTION

## 2024-09-24 SDOH — HEALTH STABILITY: PHYSICAL HEALTH: EXERCISE ACTIVITY: HEEL RAISES/TOE RAISES

## 2024-09-24 SDOH — HEALTH STABILITY: PHYSICAL HEALTH: EXERCISE ACTIVITY: MINI SQUATS

## 2024-09-24 SDOH — HEALTH STABILITY: PHYSICAL HEALTH: EXERCISE ACTIVITY: HIP EXTENSION

## 2024-09-24 ASSESSMENT — ENCOUNTER SYMPTOMS
PERSON REPORTING PAIN: PATIENT
DENIES PAIN: 1
MUSCLE WEAKNESS: 1

## 2024-09-24 ASSESSMENT — ACTIVITIES OF DAILY LIVING (ADL)
AMBULATION ASSISTANCE ON FLAT SURFACES: 1
AMBULATION_DISTANCE/DURATION_TOLERATED: 125 FT X 2

## 2024-09-26 ENCOUNTER — HOME CARE VISIT (OUTPATIENT)
Dept: HOME HEALTH SERVICES | Facility: HOME HEALTH | Age: 79
End: 2024-09-26
Payer: MEDICARE

## 2024-09-26 VITALS
RESPIRATION RATE: 14 BRPM | SYSTOLIC BLOOD PRESSURE: 128 MMHG | OXYGEN SATURATION: 99 % | HEART RATE: 82 BPM | TEMPERATURE: 98.3 F | DIASTOLIC BLOOD PRESSURE: 76 MMHG

## 2024-09-26 PROCEDURE — G0151 HHCP-SERV OF PT,EA 15 MIN: HCPCS | Mod: HHH

## 2024-09-26 SDOH — HEALTH STABILITY: PHYSICAL HEALTH: EXERCISE ACTIVITIES SETS: 1

## 2024-09-26 SDOH — HEALTH STABILITY: PHYSICAL HEALTH: EXERCISE ACTIVITY: MINI SQUATS

## 2024-09-26 SDOH — HEALTH STABILITY: PHYSICAL HEALTH: PHYSICAL EXERCISE: 15

## 2024-09-26 SDOH — HEALTH STABILITY: PHYSICAL HEALTH: PHYSICAL EXERCISE: STANDING

## 2024-09-26 SDOH — HEALTH STABILITY: PHYSICAL HEALTH: EXERCISE ACTIVITY: HS CURLS

## 2024-09-26 SDOH — HEALTH STABILITY: PHYSICAL HEALTH: EXERCISE ACTIVITY: HIP ABDUCTION

## 2024-09-26 SDOH — HEALTH STABILITY: PHYSICAL HEALTH: EXERCISE TYPE: LE EXERCISES

## 2024-09-26 SDOH — HEALTH STABILITY: PHYSICAL HEALTH: EXERCISE ACTIVITY: HIP EXTENSION

## 2024-09-26 SDOH — HEALTH STABILITY: PHYSICAL HEALTH: EXERCISE ACTIVITY: HEEL RAISES/TOE RAISES

## 2024-09-26 SDOH — HEALTH STABILITY: PHYSICAL HEALTH: EXERCISE ACTIVITY: MARCHING

## 2024-09-26 ASSESSMENT — GAIT ASSESSMENTS
BALANCE AND GAIT SCORE: 22
TRUNK: 1 - NO SWAY BUT FLEXION OF KNEES OR BACK OR SPREADS ARMS WHILE WALKING
STEP CONTINUITY: 1 - STEPS APPEAR CONTINUOUS
PATH: 1 - MILD/MODERATE DEVIATION OR USES WALKING AID
PATH SCORE: 1
GAIT SCORE: 10
STEP SYMMETRY: 1 - RIGHT AND LEFT STEP LENGTH APPEAR EQUAL
TRUNK SCORE: 1
WALKING STANCE: 1 - HEELS ALMOST TOUCHING WHILE WALKING
INITIATION OF GAIT IMMEDIATELY AFTER GO: 1 - NO HESITANCY

## 2024-09-26 ASSESSMENT — BALANCE ASSESSMENTS
ARISING SCORE: 1
BALANCE SCORE: 12
ATTEMPTS TO ARISE: 2 - ABLE TO RISE, ONE ATTEMPT
SITTING DOWN: 1 - USES ARMS OR NOT SMOOTH MOTION
TURNING 360 DEGREES STEPS: 1 - CONTINUOUS STEPS
IMMEDIATE STANDING BALANCE FIRST 5 SECONDS: 1 - STEADY BUT USES WALKER OR OTHER SUPPORT
NUDGED SCORE: 2
SITTING BALANCE: 1 - STEADY, SAFE
EYES CLOSED AT MAXIMUM POSITION NUDGED: 1 - STEADY
STANDING BALANCE: 1 - STEADY BUT WIDE STANCE AND USES CANE OR OTHER SUPPORT
ARISES: 1 - ABLE, USES ARMS TO HELP
NUDGED: 2 - STEADY

## 2024-09-26 ASSESSMENT — ENCOUNTER SYMPTOMS
OCCASIONAL FEELINGS OF UNSTEADINESS: 1
PERSON REPORTING PAIN: PATIENT
DENIES PAIN: 1
MUSCLE WEAKNESS: 1

## 2024-09-26 ASSESSMENT — ACTIVITIES OF DAILY LIVING (ADL)
AMBULATION_DISTANCE/DURATION_TOLERATED: 150 FT
AMBULATION ASSISTANCE ON FLAT SURFACES: 1
HOME_HEALTH_OASIS: 00
OASIS_M1830: 01

## 2024-10-09 ENCOUNTER — TELEPHONE (OUTPATIENT)
Dept: PRIMARY CARE | Facility: CLINIC | Age: 79
End: 2024-10-09
Payer: MEDICARE

## 2024-10-11 ENCOUNTER — PATIENT OUTREACH (OUTPATIENT)
Dept: PRIMARY CARE | Facility: CLINIC | Age: 79
End: 2024-10-11
Payer: MEDICARE

## 2024-10-11 NOTE — PROGRESS NOTES
Unable to reach patient for discharge follow up call.   LVM with call back number for patient to call if needed   If no voicemail available call attempts x 2 were made to contact the patient to assist with any questions or concerns patient may have.    Statement Selected

## 2024-10-20 NOTE — PROGRESS NOTES
HPI    79 y.o. male being seen with the following problem list:    Problem list:  BPH with bothersome LUTs, retention s/p HoLEP 8/27/24. Path 164g benign  PD     Path 160g, benign      He has Parkinson's disease. For his urinary symptoms he is on doxazosin, Flomax, finasteride. He is on Eliquis     Prostate vol (calc from CT pelvis 2022) - 248cc     Referred by Dr. Faulkner for bothersome urinary symptoms and concerns for prostate cancer. Last PSA 9/2021 was 16.3 (32 corrected for finasteride). From a urinary standpoint he has urgency, frequency, urge incontinence. Urination is sporadic. He is overall not too bothered with his urination. He wears depends. No prior prostate biopsy. Spot bladder scan today was 67cc and he voided about 3 hrs ago     PSA 8/3/23 - 8.57     9/22/23 - seen today in follow up for repeat PSA. Myrbetriq has not improved his urinary symptoms.      Patient admitted at [facility] with SUBHASH and bilateral hydro on 5/29/24. Bell placed      7/9/24 - cysto, clot evac with Dr. Gupta     08/07/24 -seen back in follow-up.  Miserable with the catheter.  He is having to have nurse visit several times a week in order to deal with catheter clogging and hematuria.  He has had procedures as above to deal with complications of hematuria.  He would like to do like to get rid of the catheter and the recurrent bleeding.    8/27/24 - HoLEP     09/12/24 - PVR 111cc. Voided a couple hours ago Doing well, urinary symptoms have improved. He is unable to hold his urine. Overall thrilled with the outcome of his procedure. Will start Myrbetriq 25mg.     10/20/24 - Seen back today over telehealth, performed this visit using real-time telehealth tools, including an audio/video connection between Marco Antonio Merlos at home and Good Huffman MD at [facility]. Consent for telemedicine visit was obtained. He is doing well, has not been able to get on myrbetriq due to cost.     Lab Results   Component Value Date    PSA  8.57 (H) 08/03/2023    PSA 16.30 (H) 09/29/2021              Current Medications:  Current Outpatient Medications   Medication Sig Dispense Refill    DULoxetine (Cymbalta) 30 mg DR capsule Take 1 capsule (30 mg) by mouth once daily for 7 days, THEN 2 capsules (60 mg) once daily. Do not crush or chew.. (Patient not taking: Reported on 8/12/2024) 30 capsule 2    finasteride (Proscar) 5 mg tablet Take 1 tablet (5 mg) by mouth once daily. 90 tablet 3    magnesium oxide (Mag-Ox) 400 mg (241.3 mg magnesium) tablet Take 1 tablet (400 mg) by mouth once daily. (Patient taking differently: Take 1 tablet (400 mg) by mouth if needed.) 90 tablet 2    mirabegron (Myrbetriq) 25 mg tablet extended release 24 hr 24 hr tablet Take 1 tablet (25 mg) by mouth once daily. 30 tablet 11    tamsulosin (Flomax) 0.4 mg 24 hr capsule Take 1 capsule (0.4 mg) by mouth once daily. 90 capsule 3     No current facility-administered medications for this visit.        Active Problems:  Marco Antonio Merlos Jr. is a 79 y.o. male with the following Problems and Medications.  Patient Active Problem List   Diagnosis    Abnormal serum protein test    Abrasion    Anemia    Acute prostatitis    Benign prostatic hyperplasia with urinary retention    Urinary urgency    Thromboembolism (Multi)    Stenosis, cervical spine    Spinal stenosis, lumbar region without neurogenic claudication    Scoliosis, unspecified    Right shoulder pain    Rib pain on right side    Restless legs syndrome    Parkinson disease (Multi)    Primary osteoarthritis of right knee    Nocturia    Multiple rib fractures    Myelomalacia (Multi)    Fracture of rib of left side    Low iron    Back pain    Lipoma    Left leg weakness    Left leg numbness    Left ankle pain    Joint pain, knee    Hyperreflexia    Status post total knee replacement    Gait instability    Gait disturbance    Fatigue    Elevated PSA    Elevated d-dimer    Dependence on wheelchair    Cervical spondylosis with myelopathy     Cellulitis    Abnormal CT scan, esophagus    Dysphagia    Hip fracture, left    Quadriceps weakness    Lumbar spinal stenosis    Acute respiratory failure with hypoxia (Multi)    Coagulation defect, unspecified    Gross hematuria    Neurogenic bladder     Current Outpatient Medications   Medication Sig Dispense Refill    DULoxetine (Cymbalta) 30 mg DR capsule Take 1 capsule (30 mg) by mouth once daily for 7 days, THEN 2 capsules (60 mg) once daily. Do not crush or chew.. (Patient not taking: Reported on 8/12/2024) 30 capsule 2    finasteride (Proscar) 5 mg tablet Take 1 tablet (5 mg) by mouth once daily. 90 tablet 3    magnesium oxide (Mag-Ox) 400 mg (241.3 mg magnesium) tablet Take 1 tablet (400 mg) by mouth once daily. (Patient taking differently: Take 1 tablet (400 mg) by mouth if needed.) 90 tablet 2    mirabegron (Myrbetriq) 25 mg tablet extended release 24 hr 24 hr tablet Take 1 tablet (25 mg) by mouth once daily. 30 tablet 11    tamsulosin (Flomax) 0.4 mg 24 hr capsule Take 1 capsule (0.4 mg) by mouth once daily. 90 capsule 3     No current facility-administered medications for this visit.       PMH:  Past Medical History:   Diagnosis Date    Neuropathy     Osteoarthritis     Parkinson disease (Multi)     RLS (restless legs syndrome)     Scoliosis     Seizure (Multi) 2020    1 episode, unknown cause    Spinal stenosis     Thromboembolism (Multi)     son denies - no history of blood clots    Urinary tract infection     admitted 7/2024 for complicated UTI and hematuria requiring cystoscopy with cauterization.       PSH:  Past Surgical History:   Procedure Laterality Date    APPENDECTOMY      COLONOSCOPY      CYSTOSCOPY      with cauterization    HIP FRACTURE SURGERY  2022    SPINE SURGERY      TOTAL KNEE ARTHROPLASTY Right 2022    TOTAL KNEE ARTHROPLASTY Left        FMH:  Family History   Problem Relation Name Age of Onset    Valvular heart disease Father      Other (sepsis) Sister      Bone cancer Sister          SHx:  Social History     Tobacco Use    Smoking status: Never    Smokeless tobacco: Never   Vaping Use    Vaping status: Never Used   Substance Use Topics    Alcohol use: Never    Drug use: Never       Allergies:  No Known Allergies    Assessment/Plan  Doing well, still some frequency and urgency. Unable to get on myrbetriq due to cost. Discussed that we would want to avoid anticholinergics given his age, risk of falls. Symptoms are overall not too bad. Discussed botox vs neuromodulation. With botox, discussed risk of transient retention, UTI. Would start at 100U rather than the standard 200U for neurogenic indications to minimize risk of putting him back into retention. They would like to proceed with this. Will get him booked accordingly, will need preprocedural abx, urine culture.      Scribe Attestation  By signing my name below, I, Holly Pederson, Scribe, attest that this documentation  has been prepared under the direction and in the presence of Good Huffman MD.

## 2024-10-23 ENCOUNTER — TELEMEDICINE (OUTPATIENT)
Dept: UROLOGY | Facility: HOSPITAL | Age: 79
End: 2024-10-23
Payer: MEDICARE

## 2024-10-23 DIAGNOSIS — N13.8 BPH WITH OBSTRUCTION/LOWER URINARY TRACT SYMPTOMS: ICD-10-CM

## 2024-10-23 DIAGNOSIS — N39.41 URGE INCONTINENCE: ICD-10-CM

## 2024-10-23 DIAGNOSIS — N40.1 BPH WITH OBSTRUCTION/LOWER URINARY TRACT SYMPTOMS: ICD-10-CM

## 2024-10-23 DIAGNOSIS — N31.9 NEUROGENIC BLADDER: Primary | ICD-10-CM

## 2024-10-23 PROCEDURE — G2211 COMPLEX E/M VISIT ADD ON: HCPCS | Performed by: UROLOGY

## 2024-10-23 PROCEDURE — 1157F ADVNC CARE PLAN IN RCRD: CPT | Performed by: UROLOGY

## 2024-10-23 PROCEDURE — 99214 OFFICE O/P EST MOD 30 MIN: CPT | Performed by: UROLOGY

## 2024-10-23 RX ORDER — SULFAMETHOXAZOLE AND TRIMETHOPRIM 800; 160 MG/1; MG/1
1 TABLET ORAL 2 TIMES DAILY
Qty: 2 TABLET | Refills: 0 | Status: SHIPPED | OUTPATIENT
Start: 2024-10-23 | End: 2024-10-24

## 2024-11-07 NOTE — PROGRESS NOTES
HPI    79 y.o. male being seen with the following problem list:    Problem list:  BPH with bothersome LUTs, retention s/p HoLEP 8/27/24. Path 164g benign  PD     Path 160g, benign      He has Parkinson's disease. For his urinary symptoms he is on doxazosin, Flomax, finasteride. He is on Eliquis     Prostate vol (calc from CT pelvis 2022) - 248cc     Referred by Dr. Faulkner for bothersome urinary symptoms and concerns for prostate cancer. Last PSA 9/2021 was 16.3 (32 corrected for finasteride). From a urinary standpoint he has urgency, frequency, urge incontinence. Urination is sporadic. He is overall not too bothered with his urination. He wears depends. No prior prostate biopsy. Spot bladder scan today was 67cc and he voided about 3 hrs ago     PSA 8/3/23 - 8.57     9/22/23 - seen today in follow up for repeat PSA. Myrbetriq has not improved his urinary symptoms.      Patient admitted at [facility] with SUBHASH and bilateral hydro on 5/29/24. Bell placed      7/9/24 - cysto, clot evac with Dr. Gupta     08/07/24 -seen back in follow-up.  Miserable with the catheter.  He is having to have nurse visit several times a week in order to deal with catheter clogging and hematuria.  He has had procedures as above to deal with complications of hematuria.  He would like to do like to get rid of the catheter and the recurrent bleeding.     8/27/24 - HoLEP     09/12/24 - PVR 111cc. Voided a couple hours ago Doing well, urinary symptoms have improved. He is unable to hold his urine. Overall thrilled with the outcome of his procedure. Will start Myrbetriq 25mg.      10/20/24 - Seen back today over telehealth, performed this visit using real-time telehealth tools, including an audio/video connection between Marco Antonio Merlos at home and Good Huffman MD at [facility]. Consent for telemedicine visit was obtained. He is doing well, has not been able to get on myrbetriq due to cost.     11/07/24 - Presents today for  cysto/botox      Lab Results   Component Value Date    PSA 8.57 (H) 08/03/2023    PSA 16.30 (H) 09/29/2021              Current Medications:  Current Outpatient Medications   Medication Sig Dispense Refill    DULoxetine (Cymbalta) 30 mg DR capsule Take 1 capsule (30 mg) by mouth once daily for 7 days, THEN 2 capsules (60 mg) once daily. Do not crush or chew.. (Patient not taking: Reported on 8/12/2024) 30 capsule 2    finasteride (Proscar) 5 mg tablet Take 1 tablet (5 mg) by mouth once daily. 90 tablet 3    magnesium oxide (Mag-Ox) 400 mg (241.3 mg magnesium) tablet Take 1 tablet (400 mg) by mouth once daily. (Patient taking differently: Take 1 tablet (400 mg) by mouth if needed.) 90 tablet 2    mirabegron (Myrbetriq) 25 mg tablet extended release 24 hr 24 hr tablet Take 1 tablet (25 mg) by mouth once daily. 30 tablet 11    tamsulosin (Flomax) 0.4 mg 24 hr capsule Take 1 capsule (0.4 mg) by mouth once daily. 90 capsule 3     No current facility-administered medications for this visit.        Active Problems:  Marco Antonio Merlos Jr. is a 79 y.o. male with the following Problems and Medications.  Patient Active Problem List   Diagnosis    Abnormal serum protein test    Abrasion    Anemia    Acute prostatitis    Benign prostatic hyperplasia with urinary retention    Urinary urgency    Thromboembolism (Multi)    Stenosis, cervical spine    Spinal stenosis, lumbar region without neurogenic claudication    Scoliosis, unspecified    Right shoulder pain    Rib pain on right side    Restless legs syndrome    Parkinson disease (Multi)    Primary osteoarthritis of right knee    Nocturia    Multiple rib fractures    Myelomalacia (Multi)    Fracture of rib of left side    Low iron    Back pain    Lipoma    Left leg weakness    Left leg numbness    Left ankle pain    Joint pain, knee    Hyperreflexia    Status post total knee replacement    Gait instability    Gait disturbance    Fatigue    Elevated PSA    Elevated d-dimer     Dependence on wheelchair    Cervical spondylosis with myelopathy    Cellulitis    Abnormal CT scan, esophagus    Dysphagia    Hip fracture, left    Quadriceps weakness    Lumbar spinal stenosis    Acute respiratory failure with hypoxia (Multi)    Coagulation defect, unspecified    Gross hematuria    Neurogenic bladder    Urge incontinence     Current Outpatient Medications   Medication Sig Dispense Refill    DULoxetine (Cymbalta) 30 mg DR capsule Take 1 capsule (30 mg) by mouth once daily for 7 days, THEN 2 capsules (60 mg) once daily. Do not crush or chew.. (Patient not taking: Reported on 8/12/2024) 30 capsule 2    finasteride (Proscar) 5 mg tablet Take 1 tablet (5 mg) by mouth once daily. 90 tablet 3    magnesium oxide (Mag-Ox) 400 mg (241.3 mg magnesium) tablet Take 1 tablet (400 mg) by mouth once daily. (Patient taking differently: Take 1 tablet (400 mg) by mouth if needed.) 90 tablet 2    mirabegron (Myrbetriq) 25 mg tablet extended release 24 hr 24 hr tablet Take 1 tablet (25 mg) by mouth once daily. 30 tablet 11    tamsulosin (Flomax) 0.4 mg 24 hr capsule Take 1 capsule (0.4 mg) by mouth once daily. 90 capsule 3     No current facility-administered medications for this visit.       PMH:  Past Medical History:   Diagnosis Date    Neuropathy     Osteoarthritis     Parkinson disease (Multi)     RLS (restless legs syndrome)     Scoliosis     Seizure (Multi) 2020    1 episode, unknown cause    Spinal stenosis     Thromboembolism (Multi)     son denies - no history of blood clots    Urinary tract infection     admitted 7/2024 for complicated UTI and hematuria requiring cystoscopy with cauterization.       PSH:  Past Surgical History:   Procedure Laterality Date    APPENDECTOMY      COLONOSCOPY      CYSTOSCOPY      with cauterization    HIP FRACTURE SURGERY  2022    SPINE SURGERY      TOTAL KNEE ARTHROPLASTY Right 2022    TOTAL KNEE ARTHROPLASTY Left        FMH:  Family History   Problem Relation Name Age of Onset     Valvular heart disease Father      Other (sepsis) Sister      Bone cancer Sister         SHx:  Social History     Tobacco Use    Smoking status: Never    Smokeless tobacco: Never   Vaping Use    Vaping status: Never Used   Substance Use Topics    Alcohol use: Never    Drug use: Never       Allergies:  No Known Allergies    Physical Exam:      Assessment/Plan      Scribe Attestation  By signing my name below, Holly DAY, Scribe, attest that this documentation  has been prepared under the direction and in the presence of Good Huffman MD.

## 2024-11-13 ENCOUNTER — APPOINTMENT (OUTPATIENT)
Dept: UROLOGY | Facility: HOSPITAL | Age: 79
End: 2024-11-13
Payer: MEDICARE

## 2024-11-15 NOTE — PROGRESS NOTES
HPI    79 y.o. male being seen with the following problem list:    Problem list:  BPH with bothersome LUTs, retention s/p HoLEP 8/27/24. Path 164g benign  PD     Path 160g, benign      He has Parkinson's disease. For his urinary symptoms he is on doxazosin, Flomax, finasteride. He is on Eliquis     Prostate vol (calc from CT pelvis 2022) - 248cc     Referred by Dr. Faulkner for bothersome urinary symptoms and concerns for prostate cancer. Last PSA 9/2021 was 16.3 (32 corrected for finasteride). From a urinary standpoint he has urgency, frequency, urge incontinence. Urination is sporadic. He is overall not too bothered with his urination. He wears depends. No prior prostate biopsy. Spot bladder scan today was 67cc and he voided about 3 hrs ago     PSA 8/3/23 - 8.57     9/22/23 - seen today in follow up for repeat PSA. Myrbetriq has not improved his urinary symptoms.      Patient admitted at [facility] with SUBHASH and bilateral hydro on 5/29/24. Bell placed      7/9/24 - cysto, clot evac with Dr. Gupta     08/07/24 -seen back in follow-up.  Miserable with the catheter.  He is having to have nurse visit several times a week in order to deal with catheter clogging and hematuria.  He has had procedures as above to deal with complications of hematuria.  He would like to do like to get rid of the catheter and the recurrent bleeding.     8/27/24 - HoLEP     09/12/24 - PVR 111cc. Voided a couple hours ago Doing well, urinary symptoms have improved. He is unable to hold his urine. Overall thrilled with the outcome of his procedure. Will start Myrbetriq 25mg.      10/20/24 - Seen back today over telehealth, performed this visit using real-time telehealth tools, including an audio/video connection between Marco Antonio Merlos at home and Good Huffman MD at [facility]. Consent for telemedicine visit was obtained. He is doing well, has not been able to get on myrbetriq due to cost.     11/15/24 - Presents today for  cysto/botox      Lab Results   Component Value Date    PSA 8.57 (H) 08/03/2023    PSA 16.30 (H) 09/29/2021              Current Medications:  Current Outpatient Medications   Medication Sig Dispense Refill    DULoxetine (Cymbalta) 30 mg DR capsule Take 1 capsule (30 mg) by mouth once daily for 7 days, THEN 2 capsules (60 mg) once daily. Do not crush or chew.. (Patient not taking: Reported on 8/12/2024) 30 capsule 2    finasteride (Proscar) 5 mg tablet Take 1 tablet (5 mg) by mouth once daily. 90 tablet 3    magnesium oxide (Mag-Ox) 400 mg (241.3 mg magnesium) tablet Take 1 tablet (400 mg) by mouth once daily. (Patient taking differently: Take 1 tablet (400 mg) by mouth if needed.) 90 tablet 2    mirabegron (Myrbetriq) 25 mg tablet extended release 24 hr 24 hr tablet Take 1 tablet (25 mg) by mouth once daily. 30 tablet 11    tamsulosin (Flomax) 0.4 mg 24 hr capsule Take 1 capsule (0.4 mg) by mouth once daily. 90 capsule 3     No current facility-administered medications for this visit.        Active Problems:  Marco Antonio Merlos Jr. is a 79 y.o. male with the following Problems and Medications.  Patient Active Problem List   Diagnosis    Abnormal serum protein test    Abrasion    Anemia    Acute prostatitis    Benign prostatic hyperplasia with urinary retention    Urinary urgency    Thromboembolism (Multi)    Stenosis, cervical spine    Spinal stenosis, lumbar region without neurogenic claudication    Scoliosis, unspecified    Right shoulder pain    Rib pain on right side    Restless legs syndrome    Parkinson disease (Multi)    Primary osteoarthritis of right knee    Nocturia    Multiple rib fractures    Myelomalacia (Multi)    Fracture of rib of left side    Low iron    Back pain    Lipoma    Left leg weakness    Left leg numbness    Left ankle pain    Joint pain, knee    Hyperreflexia    Status post total knee replacement    Gait instability    Gait disturbance    Fatigue    Elevated PSA    Elevated d-dimer     Dependence on wheelchair    Cervical spondylosis with myelopathy    Cellulitis    Abnormal CT scan, esophagus    Dysphagia    Hip fracture, left    Quadriceps weakness    Lumbar spinal stenosis    Acute respiratory failure with hypoxia (Multi)    Coagulation defect, unspecified    Gross hematuria    Neurogenic bladder    Urge incontinence     Current Outpatient Medications   Medication Sig Dispense Refill    DULoxetine (Cymbalta) 30 mg DR capsule Take 1 capsule (30 mg) by mouth once daily for 7 days, THEN 2 capsules (60 mg) once daily. Do not crush or chew.. (Patient not taking: Reported on 8/12/2024) 30 capsule 2    finasteride (Proscar) 5 mg tablet Take 1 tablet (5 mg) by mouth once daily. 90 tablet 3    magnesium oxide (Mag-Ox) 400 mg (241.3 mg magnesium) tablet Take 1 tablet (400 mg) by mouth once daily. (Patient taking differently: Take 1 tablet (400 mg) by mouth if needed.) 90 tablet 2    mirabegron (Myrbetriq) 25 mg tablet extended release 24 hr 24 hr tablet Take 1 tablet (25 mg) by mouth once daily. 30 tablet 11    tamsulosin (Flomax) 0.4 mg 24 hr capsule Take 1 capsule (0.4 mg) by mouth once daily. 90 capsule 3     No current facility-administered medications for this visit.       PMH:  Past Medical History:   Diagnosis Date    Neuropathy     Osteoarthritis     Parkinson disease (Multi)     RLS (restless legs syndrome)     Scoliosis     Seizure (Multi) 2020    1 episode, unknown cause    Spinal stenosis     Thromboembolism (Multi)     son denies - no history of blood clots    Urinary tract infection     admitted 7/2024 for complicated UTI and hematuria requiring cystoscopy with cauterization.       PSH:  Past Surgical History:   Procedure Laterality Date    APPENDECTOMY      COLONOSCOPY      CYSTOSCOPY      with cauterization    HIP FRACTURE SURGERY  2022    SPINE SURGERY      TOTAL KNEE ARTHROPLASTY Right 2022    TOTAL KNEE ARTHROPLASTY Left        FMH:  Family History   Problem Relation Name Age of Onset     Valvular heart disease Father      Other (sepsis) Sister      Bone cancer Sister         SHx:  Social History     Tobacco Use    Smoking status: Never    Smokeless tobacco: Never   Vaping Use    Vaping status: Never Used   Substance Use Topics    Alcohol use: Never    Drug use: Never       Allergies:  No Known Allergies    Procedure:  After informed consent was obtained, the patient was taken to the procedure room for cystoscopy and intradetrusor botox for medically-refractory OAB.     Cystoscopy with Botox injection     Procedure Note:       A sterile prep and drape was performed in standard fashion. Lidocaine was used for topical anesthesia. A rigid cystoscope was inserted into the urethra without difficulty revealing normal urethra.     Using an injection needle, 100U of Botox was injected into the posterior bladder wall into detrusor muscle in 1mL aliquots. No significant bleeding was noted.      Post-Procedure:   The cystoscope was removed. The vital signs were stable . The patient tolerated the procedure well. There were no complications.       Assessment/Plan      Scribe Attestation  By signing my name below, I Abnercynthia Pederson, Scribe, attest that this documentation  has been prepared under the direction and in the presence of Good Huffman MD.

## 2024-11-19 ENCOUNTER — TELEPHONE (OUTPATIENT)
Dept: UROLOGY | Facility: HOSPITAL | Age: 79
End: 2024-11-19
Payer: MEDICARE

## 2024-11-19 NOTE — TELEPHONE ENCOUNTER
Tried calling patient to reschedule appt per Nurse, no answer and unable to lvm due to voicemail box not being set up

## 2024-11-20 ENCOUNTER — APPOINTMENT (OUTPATIENT)
Dept: UROLOGY | Facility: HOSPITAL | Age: 79
End: 2024-11-20
Payer: MEDICARE

## 2024-11-21 ENCOUNTER — APPOINTMENT (OUTPATIENT)
Dept: UROLOGY | Facility: HOSPITAL | Age: 79
End: 2024-11-21
Payer: MEDICARE

## 2025-01-09 ENCOUNTER — APPOINTMENT (OUTPATIENT)
Dept: UROLOGY | Facility: HOSPITAL | Age: 80
End: 2025-01-09
Payer: MEDICARE

## 2025-01-30 ENCOUNTER — OFFICE VISIT (OUTPATIENT)
Dept: NEUROLOGY | Facility: CLINIC | Age: 80
End: 2025-01-30
Payer: MEDICARE

## 2025-01-30 VITALS
BODY MASS INDEX: 27.66 KG/M2 | SYSTOLIC BLOOD PRESSURE: 137 MMHG | HEIGHT: 65 IN | WEIGHT: 166 LBS | DIASTOLIC BLOOD PRESSURE: 76 MMHG | HEART RATE: 98 BPM

## 2025-01-30 DIAGNOSIS — G20.A1 PARKINSON'S DISEASE WITHOUT DYSKINESIA OR FLUCTUATING MANIFESTATIONS: Primary | ICD-10-CM

## 2025-01-30 PROCEDURE — 1159F MED LIST DOCD IN RCRD: CPT | Performed by: STUDENT IN AN ORGANIZED HEALTH CARE EDUCATION/TRAINING PROGRAM

## 2025-01-30 PROCEDURE — 1036F TOBACCO NON-USER: CPT | Performed by: STUDENT IN AN ORGANIZED HEALTH CARE EDUCATION/TRAINING PROGRAM

## 2025-01-30 PROCEDURE — 1157F ADVNC CARE PLAN IN RCRD: CPT | Performed by: STUDENT IN AN ORGANIZED HEALTH CARE EDUCATION/TRAINING PROGRAM

## 2025-01-30 PROCEDURE — 99214 OFFICE O/P EST MOD 30 MIN: CPT | Performed by: STUDENT IN AN ORGANIZED HEALTH CARE EDUCATION/TRAINING PROGRAM

## 2025-01-30 PROCEDURE — G2211 COMPLEX E/M VISIT ADD ON: HCPCS | Performed by: STUDENT IN AN ORGANIZED HEALTH CARE EDUCATION/TRAINING PROGRAM

## 2025-01-30 RX ORDER — CARBIDOPA AND LEVODOPA 35; 140 MG/1; MG/1
CAPSULE, EXTENDED RELEASE ORAL
Qty: 180 EACH | Refills: 5 | Status: SHIPPED | OUTPATIENT
Start: 2025-01-30 | End: 2026-02-13

## 2025-01-30 ASSESSMENT — ENCOUNTER SYMPTOMS
OCCASIONAL FEELINGS OF UNSTEADINESS: 1
DEPRESSION: 0
LOSS OF SENSATION IN FEET: 1

## 2025-01-30 ASSESSMENT — UNIFIED PARKINSONS DISEASE RATING SCALE (UPDRS)
KINETIC_TREMOR_LEFTHAND: 0
AMPLITUDE_RUE: 2
LEG_AGILITY_RIGHT: 3
AMPLITUDE_RLE: 0
TOETAPPING_RIGHT: 3
HANDMOVEMENTS_RIGHT: 2
LEVODOPA: NO
GAIT: 3
RIGIDITY_RUE: 2
RIGIDITY_LLE: 2
POSTURAL_TREMOR_RIGHTHAND: 1
RIGIDITY_RLE: 2
RIGIDITY_LUE: 3
PRONATION_SUPINATION_LEFT: 3
FINGER_TAPPING_LEFT: 3
AMPLITUDE_LUE: 3
CONSTANCY_TREMOR_ATREST: 4
RIGIDITY_NECK: 2
CHAIR_RISING_SCALE: 4
PRONATION_SUPINATION_RIGHT: 2
FACIAL_EXPRESSION: 2
AMPLITUDE_LIP_JAW: 0
DYSKINESIAS_PRESENT: NO
FINGER_TAPPING_RIGHT: 3
SPONTANEITY_OF_MOVEMENT: 3
POSTURAL_TREMOR_LEFTHAND: 1
SPEECH: 2
PARKINSONS_MEDS: NO
LEG_AGILITY_LEFT: 3
KINETIC_TREMOR_RIGHTHAND: 0
AMPLITUDE_LLE: 0
TOETAPPING_LEFT: 4

## 2025-01-30 ASSESSMENT — PATIENT HEALTH QUESTIONNAIRE - PHQ9
2. FEELING DOWN, DEPRESSED OR HOPELESS: NOT AT ALL
1. LITTLE INTEREST OR PLEASURE IN DOING THINGS: NOT AT ALL
SUM OF ALL RESPONSES TO PHQ9 QUESTIONS 1 AND 2: 0

## 2025-01-30 NOTE — PATIENT INSTRUCTIONS
Thank you for your visit today. You were seen by Dr. Cantor for Parkinson disease. If you have any questions or need to reach me, call my office at [phone].    We discussed the following plan today:   Try Crexont as directed. If you get dizzy then go back to the previously tolerated dose  Drink 7 glasses of water a day  Add 1 tsp of extra salt per day  Ice and elevate the ankle 1 hour each day  Ibuprofen 600 mg 3 times a day for 10-14 days  Return in 4 months

## 2025-01-30 NOTE — PROGRESS NOTES
"Subjective     Marco Antonio Merlos Jr. is a right handed  79 y.o. year old male who presents for 3 month f/u of multifactorial gait d/o (R knee arthritis, cervical and lumbar stenosis, neuropathy, and PD. Patient is accompanied by: child (his son), who helps provide collateral.    Visit type: follow up visit     HPI  He has been having right foot cramps for the past several days. He is not sure what muscle specifically cramps. His right ankle has been swollen for 3 days without clear history of trauma. He remains off PD medicines because of history of dizziness. He has only had one fall in the past year. He is using the walker. Tremor does not bother him much.    Son feels memory is fairly good but he responds to every question rather than the patient. He is sleeping fairly well. He is very short of hearing.    Past Hx:   Has had walking problems for several years, shuffling gait since 2020 and intermittent falls, with right knee giving out. His right knee is severely painful and \"feels loose.\" In March 2022 seen by Dr. Cantor and reported worsening of gait for the last 2 months with a relatively fast decline, using a walker the more recently wheelchair-bound for the past month - he does not think he can walk even with walker. He also reported micrographia, LH tremor for a couple years as well as hyposmia.    Had cervical and thoracic surgery in May 2022 at [facility] and a R knee replacement in Aug 2022. Then he suffered a L hip fx requiring surgery in 12/2022 after a fall when turning in the kitchen. He did physical therapy and can walk with the walker but not well due to balance issues. PCP took him off gabapentin for neuropathy in case that was contributing to balance issues.     Stopped his carbidopa/levodopa before clinic visit in 8/17/23 d/t dizziness, has not been on PD medications since then.    EMG of BLE Feb 2022 was reported normal  MRI L-spine Feb 2022 showed extensive spondylitic changes: multi-level canal " stenosis, moderate at worst, scoliosis, and spondylolisthesis    Patient Health Questionnaire-2 Score: 0        Review of Systems  All other system have been reviewed and are negative for complaint.    Objective   Visit Vitals  /76 (BP Location: Left arm, Patient Position: Sitting)   Pulse 98      Neurological Exam  Unable to stand due to large wheelchair stand  MDS UPDRS 1st Score: Motor Examination  Is the patient on medication for treating the symptoms of Parkinson's Disease?: No  Is the patient on Levodopa?: No  Speech: 2  Facial Expression: 2  Rigidty Neck: 2  Rigidty RUE: 2  Rigidity - LUE: 3  Rigidity RLE: 2  Rigidity LLE: 2  Finger Tapping Right Hand: 3  Finger Tapping Left Hand: 3  Hand Movements- Right Hand: 2  Hand Movements- Left Hand: 3  Pronatiaon-Supination Movments - Right Hand: 2  Pronatiaon-Supination Movments Left Hand: 3  Toe Tapping Right Foot: 3  Toe Tapping - Left Foot: 4  Leg Agility - Right Leg: 3  Leg Agility - Left leg: 3  Arising from Chair: 4  Gait: 3  Global Spontanteity of Movment ( Body Bradykinesia): 3  Postural Tremor - Right Hand: 1  Postural Tremor - Left hand: 1  Kinetic Tremor - Right hand: 0  Kinetic Tremor - Left hand: 0  Rest Tremor Amplitude - RUE: 2  Rest Tremor Amplitude - LUE: 3  Rest Tremor Amplitude - RLE: 0  Rest Tremor Amplitude - LLE: 0  Rest Tremor Amplitude - Lip/Jaw: 0  Constancy of Rest Tremor: 4  Were dyskinesias (chorea or dystonia) present during examination?: No         Physical Exam  Assessment/Plan   Marco Antonio Merlos Jr. is a right handed 79 y.o. y.o. year old male who presents for 3 month f/u of multifactorial gait d/o (R knee arthritis, cervical and lumbar stenosis, neuropathy, and PD. He has had significant progression of PD with moderate to severe parkinsonism. He previously did not tolerate Sinemet but will trial Crexont.    We discussed the following plan today:   Try Crexont as directed. If you get dizzy then go back to the previously tolerated  dose  Drink 7 glasses of water a day  Add 1 tsp of extra salt per day  Ice and elevate the ankle 1 hour each day  Ibuprofen 600 mg 3 times a day for 10-14 days  MOCA next visit  Return in 4 months

## 2025-02-03 ENCOUNTER — OFFICE VISIT (OUTPATIENT)
Dept: PRIMARY CARE | Facility: CLINIC | Age: 80
End: 2025-02-03
Payer: MEDICARE

## 2025-02-03 VITALS
OXYGEN SATURATION: 95 % | HEIGHT: 65 IN | DIASTOLIC BLOOD PRESSURE: 83 MMHG | WEIGHT: 166 LBS | BODY MASS INDEX: 27.66 KG/M2 | TEMPERATURE: 97 F | HEART RATE: 90 BPM | SYSTOLIC BLOOD PRESSURE: 133 MMHG | RESPIRATION RATE: 18 BRPM

## 2025-02-03 DIAGNOSIS — L98.9 SKIN ABNORMALITY: ICD-10-CM

## 2025-02-03 DIAGNOSIS — M25.571 CHRONIC PAIN OF RIGHT ANKLE: ICD-10-CM

## 2025-02-03 DIAGNOSIS — G89.29 CHRONIC PAIN OF RIGHT ANKLE: ICD-10-CM

## 2025-02-03 DIAGNOSIS — M79.604 LEG PAIN, ANTERIOR, RIGHT: ICD-10-CM

## 2025-02-03 DIAGNOSIS — I74.9 THROMBOEMBOLISM (MULTI): ICD-10-CM

## 2025-02-03 DIAGNOSIS — R26.9 GAIT DISTURBANCE: Primary | ICD-10-CM

## 2025-02-03 DIAGNOSIS — R60.0 LOCALIZED EDEMA: ICD-10-CM

## 2025-02-03 DIAGNOSIS — G95.89 MYELOMALACIA (MULTI): ICD-10-CM

## 2025-02-03 DIAGNOSIS — I67.81 ACUTE CEREBROVASCULAR INSUFFICIENCY: ICD-10-CM

## 2025-02-03 PROBLEM — M72.2 PLANTAR FASCIITIS OF RIGHT FOOT: Status: ACTIVE | Noted: 2025-02-03

## 2025-02-03 PROBLEM — N17.9 AKI (ACUTE KIDNEY INJURY) (CMS-HCC): Status: ACTIVE | Noted: 2024-05-29

## 2025-02-03 PROBLEM — M21.762 UNEQUAL LIMB LENGTH (ACQUIRED), LEFT TIBIA: Status: ACTIVE | Noted: 2025-02-03

## 2025-02-03 PROBLEM — R60.9 EDEMA: Status: ACTIVE | Noted: 2025-02-03

## 2025-02-03 PROCEDURE — 1036F TOBACCO NON-USER: CPT | Performed by: FAMILY MEDICINE

## 2025-02-03 PROCEDURE — 99214 OFFICE O/P EST MOD 30 MIN: CPT | Performed by: FAMILY MEDICINE

## 2025-02-03 PROCEDURE — G2211 COMPLEX E/M VISIT ADD ON: HCPCS | Performed by: FAMILY MEDICINE

## 2025-02-03 PROCEDURE — 1160F RVW MEDS BY RX/DR IN RCRD: CPT | Performed by: FAMILY MEDICINE

## 2025-02-03 PROCEDURE — 1157F ADVNC CARE PLAN IN RCRD: CPT | Performed by: FAMILY MEDICINE

## 2025-02-03 PROCEDURE — 1159F MED LIST DOCD IN RCRD: CPT | Performed by: FAMILY MEDICINE

## 2025-02-03 PROCEDURE — 93000 ELECTROCARDIOGRAM COMPLETE: CPT | Performed by: FAMILY MEDICINE

## 2025-02-03 ASSESSMENT — ENCOUNTER SYMPTOMS
ORTHOPNEA: 0
ARTHRALGIAS: 1
PND: 0
HEMOPTYSIS: 0
COLOR CHANGE: 1

## 2025-02-03 ASSESSMENT — PATIENT HEALTH QUESTIONNAIRE - PHQ9
2. FEELING DOWN, DEPRESSED OR HOPELESS: NOT AT ALL
SUM OF ALL RESPONSES TO PHQ9 QUESTIONS 1 AND 2: 0
1. LITTLE INTEREST OR PLEASURE IN DOING THINGS: NOT AT ALL

## 2025-02-03 NOTE — ASSESSMENT & PLAN NOTE
Reviewed patient with son he had an asymmetrical swelling of right lower extremity question DVT EKG reviewed with results indicated a normal sinus rhythm left axis deviation poor R wave progression nonspecific ST segment changes will check duplex ultrasound check pending labs

## 2025-02-03 NOTE — PATIENT INSTRUCTIONS

## 2025-02-03 NOTE — PROGRESS NOTES
Subjective   Patient ID: Marco Antonio Merlos Jr. is a 79 y.o. male who presents for Foot Swelling.  Ankle Pain   The incident occurred 3 to 5 days ago. The injury mechanism is unknown. The pain is present in the right ankle. The quality of the pain is described as aching. The pain is at a severity of 8/10. The symptoms are aggravated by weight bearing. He has tried elevation for the symptoms.   Edema    Presents with new edema. The current episode started less than one week ago. The onset of the episode was gradual. These episodes happen throughout the day. The problem has been gradually improving. The edema is present on the right side(s). Risk factors: immobility.     Pertinent negative symptoms include no chest pain, no hemoptysis, no orthopnea and no PND.   Has rash   on    right side of neck????    Review of Systems   Respiratory:  Negative for hemoptysis.    Cardiovascular:  Positive for leg swelling. Negative for chest pain and PND.   Musculoskeletal:  Positive for arthralgias.   Skin:  Positive for color change.   All other  pertinent  systems reviewed and are negative except  those  mentioned  in HPI     Objective   Physical Exam  Vitals: I have reviewed the vitals  General: Well-developed.  In no acute distress.  Eyes:   sclera nonicteric.  Conjunctiva not injected.  No discharge.   HEAD: Normocephalic, atraumatic.  HEENT   Mucous membranes moist.  Posterior oropharynx nonerythematous, no tonsillar exudates.      No cervical lymphadenopathy.  Cardio: Regular rate and rhythm.  No murmur, rub or gallop.  Pulmonary: Lungs clear to auscultation in all fields.  No accessory muscle use.  GI/: Normal active bowel sounds.  Soft, nontender.  No masses or organomegaly appreciated.  Musculoskeletal:   gross deformities appreciated.    Right  lower  extremity with fusiform  swelling   Neuro: Alert, age-appropriate.  Normal muscle tone.  Moving all extremities.  Skin: hyperpigmentation  of rightlower extremity ... No   definite  ligament  instability neck with raised area and oval-shaped with irregular pigmentation  Labs        Assessment/Plan   Problem List Items Addressed This Visit       Thromboembolism (Multi)    Myelomalacia (Multi)    Ankle pain, right     Check    xray ...  obsevere         Relevant Orders    Uric Acid    XR ankle right 3+ views    XR tibia fibula right 2 views    Gait disturbance - Primary    Leg pain, anterior, right    Edema     Reviewed patient with son he had an asymmetrical swelling of right lower extremity question DVT EKG reviewed with results indicated a normal sinus rhythm left axis deviation poor R wave progression nonspecific ST segment changes will check duplex ultrasound check pending labs         Relevant Orders    Comprehensive Metabolic Panel    CBC and Auto Differential    Thyroxine, Free    Thyroid Stimulating Hormone    Lipid Panel    Vascular US Lower Extremity Venous Duplex Right    ECG 12 Lead     Other Visit Diagnoses       Acute cerebrovascular insufficiency        Relevant Orders    Lipid Panel    Skin abnormality        Relevant Orders    Referral to Dermatology        EKG shows..  Normal sinus rhythm.  Left axis deviation..  Nonspecific ST segment changes..  Poor wave progression.   ms.  QRS 86 ms.  Q waves high lateral wall  ]   Labs reviewed CMP 8/24 with normal kidney function calcium slightly decreased at 8.1 total protein decreased at 5.6    CBC with hemoglobin 12.2 hematocrit 36.0 WBC 9.2 platelets 360 dated 8/24 B12 727 2    CT of abdomen pelvis 5/29/2024 revealed lung bases no focal parenchymal abnormalities liver showed no focal parenchymal abnormality gallbladder spleen pancreas adrenals unremarked right kidney perinephric stranding with mild to moderate right-sided hydronephrosis with a exophytic cystic area 4.4 x 4.2 cm with left kidney with exophytic cystic area 6.9 x 7.8 cm prostate large 7.8 x 8.8 x 7.8

## 2025-02-06 ENCOUNTER — APPOINTMENT (OUTPATIENT)
Dept: UROLOGY | Facility: HOSPITAL | Age: 80
End: 2025-02-06
Payer: MEDICARE

## 2025-02-07 ENCOUNTER — HOSPITAL ENCOUNTER (OUTPATIENT)
Dept: RADIOLOGY | Facility: HOSPITAL | Age: 80
Discharge: HOME | End: 2025-02-07
Payer: MEDICARE

## 2025-02-07 DIAGNOSIS — G89.29 CHRONIC PAIN OF RIGHT ANKLE: ICD-10-CM

## 2025-02-07 DIAGNOSIS — M25.571 CHRONIC PAIN OF RIGHT ANKLE: ICD-10-CM

## 2025-02-07 PROCEDURE — 73610 X-RAY EXAM OF ANKLE: CPT | Mod: RT

## 2025-02-07 PROCEDURE — 73590 X-RAY EXAM OF LOWER LEG: CPT | Mod: RT

## 2025-02-08 LAB
ALBUMIN SERPL-MCNC: 4.2 G/DL (ref 3.6–5.1)
ALP SERPL-CCNC: 78 U/L (ref 35–144)
ALT SERPL-CCNC: 10 U/L (ref 9–46)
ANION GAP SERPL CALCULATED.4IONS-SCNC: 10 MMOL/L (CALC) (ref 7–17)
AST SERPL-CCNC: 18 U/L (ref 10–35)
BASOPHILS # BLD AUTO: 130 CELLS/UL (ref 0–200)
BASOPHILS NFR BLD AUTO: 1.6 %
BILIRUB SERPL-MCNC: 0.9 MG/DL (ref 0.2–1.2)
BUN SERPL-MCNC: 20 MG/DL (ref 7–25)
CALCIUM SERPL-MCNC: 8.9 MG/DL (ref 8.6–10.3)
CHLORIDE SERPL-SCNC: 103 MMOL/L (ref 98–110)
CHOLEST SERPL-MCNC: 133 MG/DL
CHOLEST/HDLC SERPL: 2.8 (CALC)
CO2 SERPL-SCNC: 24 MMOL/L (ref 20–32)
CREAT SERPL-MCNC: 0.94 MG/DL (ref 0.7–1.22)
EGFRCR SERPLBLD CKD-EPI 2021: 81 ML/MIN/1.73M2
EOSINOPHIL # BLD AUTO: 964 CELLS/UL (ref 15–500)
EOSINOPHIL NFR BLD AUTO: 11.9 %
ERYTHROCYTE [DISTWIDTH] IN BLOOD BY AUTOMATED COUNT: 14.6 % (ref 11–15)
GLUCOSE SERPL-MCNC: 90 MG/DL (ref 65–99)
HCT VFR BLD AUTO: 38.2 % (ref 38.5–50)
HDLC SERPL-MCNC: 48 MG/DL
HGB BLD-MCNC: 13 G/DL (ref 13.2–17.1)
LDLC SERPL CALC-MCNC: 72 MG/DL (CALC)
LYMPHOCYTES # BLD AUTO: 1247 CELLS/UL (ref 850–3900)
LYMPHOCYTES NFR BLD AUTO: 15.4 %
MCH RBC QN AUTO: 33.9 PG (ref 27–33)
MCHC RBC AUTO-ENTMCNC: 34 G/DL (ref 32–36)
MCV RBC AUTO: 99.7 FL (ref 80–100)
MONOCYTES # BLD AUTO: 697 CELLS/UL (ref 200–950)
MONOCYTES NFR BLD AUTO: 8.6 %
NEUTROPHILS # BLD AUTO: 5063 CELLS/UL (ref 1500–7800)
NEUTROPHILS NFR BLD AUTO: 62.5 %
NONHDLC SERPL-MCNC: 85 MG/DL (CALC)
PLATELET # BLD AUTO: 353 THOUSAND/UL (ref 140–400)
PMV BLD REES-ECKER: 9.9 FL (ref 7.5–12.5)
POTASSIUM SERPL-SCNC: 4.3 MMOL/L (ref 3.5–5.3)
PROT SERPL-MCNC: 6.6 G/DL (ref 6.1–8.1)
RBC # BLD AUTO: 3.83 MILLION/UL (ref 4.2–5.8)
SODIUM SERPL-SCNC: 137 MMOL/L (ref 135–146)
T4 FREE SERPL-MCNC: 1.1 NG/DL (ref 0.8–1.8)
TRIGL SERPL-MCNC: 47 MG/DL
TSH SERPL-ACNC: 2.27 MIU/L (ref 0.4–4.5)
URATE SERPL-MCNC: 5.5 MG/DL (ref 4–8)
WBC # BLD AUTO: 8.1 THOUSAND/UL (ref 3.8–10.8)

## 2025-02-10 DIAGNOSIS — S82.61XA CLOSED AVULSION FRACTURE OF LATERAL MALLEOLUS OF RIGHT FIBULA, INITIAL ENCOUNTER: Primary | ICD-10-CM

## 2025-02-11 ENCOUNTER — TELEPHONE (OUTPATIENT)
Dept: PRIMARY CARE | Facility: CLINIC | Age: 80
End: 2025-02-11
Payer: MEDICARE

## 2025-02-11 NOTE — TELEPHONE ENCOUNTER
Called jane Hopkins, and got no answer. Called Dilma Gaurang (daughter in law) & went over results per Dr. Loya's request, as well as, scheduled with Dr. Loya Orthopedic doctor. See appt. Details below.   Appt. Date & time given to Dilma.     Name: Marco Antonio Merlos Jr. MRN: [id number]   Date: 2/17/2025 Status: Chuy   Time: 1:30 PM     Arrive By: 1:15 PM       Length: 15     Visit Type: ORTHOPEDIC NPV [4634] Copay: $0.00   Provider: Satnhosh Loya MD Department: [facility]       Department Address: [address] [place] [address] [address] [place] [id number]

## 2025-02-11 NOTE — TELEPHONE ENCOUNTER
----- Message from Santhosh Loya sent at 2/10/2025  8:42 PM EST -----  Looks like ankle sprain on x-ray with bone fracture small piece.  This might explain swelling of ankle.  Have placed orthopedic referral for opinion.  For now limit activity and get in with Ortho for evaluation thanks much

## 2025-02-17 ENCOUNTER — OFFICE VISIT (OUTPATIENT)
Dept: ORTHOPEDIC SURGERY | Facility: CLINIC | Age: 80
End: 2025-02-17
Payer: MEDICARE

## 2025-02-17 ENCOUNTER — HOSPITAL ENCOUNTER (OUTPATIENT)
Dept: RADIOLOGY | Facility: CLINIC | Age: 80
Discharge: HOME | End: 2025-02-17
Payer: MEDICARE

## 2025-02-17 DIAGNOSIS — S82.61XA CLOSED AVULSION FRACTURE OF LATERAL MALLEOLUS OF RIGHT FIBULA, INITIAL ENCOUNTER: ICD-10-CM

## 2025-02-17 DIAGNOSIS — M19.072 LOCALIZED OSTEOARTHRITIS OF ANKLES, BILATERAL: Primary | ICD-10-CM

## 2025-02-17 DIAGNOSIS — M25.572 LEFT ANKLE PAIN, UNSPECIFIED CHRONICITY: ICD-10-CM

## 2025-02-17 DIAGNOSIS — M19.071 LOCALIZED OSTEOARTHRITIS OF ANKLES, BILATERAL: Primary | ICD-10-CM

## 2025-02-17 PROCEDURE — L1902 AFO ANKLE GAUNTLET PRE OTS: HCPCS | Performed by: STUDENT IN AN ORGANIZED HEALTH CARE EDUCATION/TRAINING PROGRAM

## 2025-02-17 PROCEDURE — 1157F ADVNC CARE PLAN IN RCRD: CPT | Performed by: STUDENT IN AN ORGANIZED HEALTH CARE EDUCATION/TRAINING PROGRAM

## 2025-02-17 PROCEDURE — 99214 OFFICE O/P EST MOD 30 MIN: CPT | Performed by: STUDENT IN AN ORGANIZED HEALTH CARE EDUCATION/TRAINING PROGRAM

## 2025-02-17 PROCEDURE — 73610 X-RAY EXAM OF ANKLE: CPT | Mod: LT

## 2025-02-17 PROCEDURE — 73610 X-RAY EXAM OF ANKLE: CPT | Mod: LEFT SIDE | Performed by: STUDENT IN AN ORGANIZED HEALTH CARE EDUCATION/TRAINING PROGRAM

## 2025-02-17 RX ORDER — METHYLPREDNISOLONE 4 MG/1
TABLET ORAL
Qty: 1 EACH | Refills: 0 | Status: SHIPPED | OUTPATIENT
Start: 2025-02-17

## 2025-02-17 NOTE — PROGRESS NOTES
Chief Complaint   Patient presents with    Right Ankle - Pain, New Patient Visit     Xrays done 2/7/2025 at [facility]    Left Ankle - Pain, New Patient Visit     Xrays today       HPI  79-year-old male presents today for evaluation of bilateral ankles.  No recent history of trauma endorses sharp bilateral ankle pain insidious onset for the past week or so.  Some days worse than others.  Has had a hard time sleeping due to pain and discomfort.    Past Medical History:   Diagnosis Date    Neuropathy     Osteoarthritis     Parkinson disease (Multi)     RLS (restless legs syndrome)     Scoliosis     Seizure (Multi) 2020    1 episode, unknown cause    Spinal stenosis     Thromboembolism (Multi)     son denies - no history of blood clots    Urinary tract infection     admitted 7/2024 for complicated UTI and hematuria requiring cystoscopy with cauterization.       Past Surgical History:   Procedure Laterality Date    APPENDECTOMY      COLONOSCOPY      CYSTOSCOPY      with cauterization    HIP FRACTURE SURGERY  2022    SPINE SURGERY      TOTAL KNEE ARTHROPLASTY Right 2022    TOTAL KNEE ARTHROPLASTY Left         No Known Allergies     Physical exam    General: Alert and oriented to place, person, and time.  No acute distress and breathing comfortably; pleasant and cooperative with the examination.  HEENT: Head is normocephalic and atraumatic.  Neck: Supple, no visible swelling.  Cardiovascular: Good perfusion to the affected extremity.  Lungs: No audible wheezing or labored breathing.  Abdomen: Nondistended  HEME/Lymph : No visible abnormalities bilateral lower extremity    Extremity:  Focused examination bilateral ankles: Mild erythema about both ankles moderate edema about both ankles dorsiflexion EHL plantarflexion intact no short arc pain.  Mild diffuse tenderness palpation about the ankle.  No overlying skin changes.  Extremities warm and well-perfused neurovascular intact mild pain with inversion and eversion as  well.    Diagnostics:  XR ankle left 3+ views    Result Date: 2/17/2025  Interpreted By:  Jonn Red III, STUDY: XR ANKLE LEFT 3+ VIEWS; ;  2/17/2025 2:19 pm   INDICATION: Signs/Symptoms:pain.   ,M25.572 Pain in left ankle and joints of left foot   COMPARISON: None.   ACCESSION NUMBER(S): [id number]   ORDERING CLINICIAN: JONN RED   FINDINGS: Three views left ankle: There is moderate joint space narrowing diffusely about the ankle joint ossicle about the medial gutter consistent with possible loose body there is soft tissue swelling anteriorly about the ankle no acute osseous abnormality no fracture or dislocation appreciated there is enthesophytic change about the insertion point of the Achilles tendon as well as the insertion point of the plantar fascia       No acute osseous abnormality     MACRO: None   Signed by: Jonn Red III 2/17/2025 2:27 PM Dictation workstation:   [id number]    XR tibia fibula right 2 views    Result Date: 2/8/2025  Interpreted By:  Luis Roberts, STUDY: XR TIBIA FIBULA RIGHT 2 VIEWS; ;  2/7/2025 12:08 pm   INDICATION: Signs/Symptoms:pain.   ,M25.571 Pain in right ankle and joints of right foot,G89.29 Other chronic pain   COMPARISON: None.   ACCESSION NUMBER(S): [id number]   ORDERING CLINICIAN: JONN RED   FINDINGS: Right tibia-fibula, two views   Mildly displaced avulsion fracture at the medial malleolus better seen on dedicated views of the ankle. No acute fracture or dislocation otherwise. Total knee arthroplasty present. Soft tissue edema.           Mild displaced avulsion fracture at the medial malleolus better seen on dedicated views of the ankle. Right total knee arthroplasty without acute abnormality.   MACRO: None   Signed by: Luis Roberts 2/8/2025 9:22 AM Dictation workstation:   [id number]    XR ankle right 3+ views    Result Date: 2/8/2025  Interpreted By:  Luis Roberts, STUDY: XR ANKLE RIGHT 3+ VIEWS; ;  2/7/2025 12:08 pm   INDICATION:  Signs/Symptoms:pain.   ,M25.571 Pain in right ankle and joints of right foot,G89.29 Other chronic pain   COMPARISON: None.   ACCESSION NUMBER(S): [id number]   ORDERING CLINICIAN: JONN RED   FINDINGS: Right ankle, three views   Small avulsion fracture at the tip of the medial malleolus. There is bimalleolar soft tissue edema. No other fracture seen. No dislocation. Small Achilles and small plantar calcaneal enthesophyte. Moderate the midfoot osteoarthritis present         Small avulsion fracture at the tip of the medial malleolus. Bimalleolar soft tissue edema.   MACRO: None   Signed by: Luis Roberts 2/8/2025 9:21 AM Dictation workstation:   [id number]    ECG 12 Lead    Result Date: 2/3/2025  EKG shows..  Normal sinus rhythm.  Left axis deviation..  Nonspecific ST segment changes..  Poor wave progression.   ms.  QRS 86 ms.  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  Will provide a prescription for oral Medrol Dosepak  Have him follow-up in 2 weeks  If fails to have significant relief may benefit from an intra-articular joint injection  Discussed activities to avoid as well as importance of using pain as a guide  We will provide bilateral lace up ankle braces today to provide some support and relief  All of the patient's questions were answered.

## 2025-03-03 ENCOUNTER — APPOINTMENT (OUTPATIENT)
Dept: ORTHOPEDIC SURGERY | Facility: CLINIC | Age: 80
End: 2025-03-03
Payer: MEDICARE

## 2025-03-05 ENCOUNTER — APPOINTMENT (OUTPATIENT)
Dept: ORTHOPEDIC SURGERY | Facility: CLINIC | Age: 80
End: 2025-03-05
Payer: MEDICARE

## 2025-03-12 ENCOUNTER — APPOINTMENT (OUTPATIENT)
Dept: ORTHOPEDIC SURGERY | Facility: CLINIC | Age: 80
End: 2025-03-12
Payer: MEDICARE

## 2025-04-07 ENCOUNTER — TELEPHONE (OUTPATIENT)
Dept: PRIMARY CARE | Facility: CLINIC | Age: 80
End: 2025-04-07
Payer: MEDICARE

## 2025-04-07 ENCOUNTER — OFFICE VISIT (OUTPATIENT)
Dept: ORTHOPEDIC SURGERY | Facility: CLINIC | Age: 80
End: 2025-04-07
Payer: MEDICARE

## 2025-04-07 DIAGNOSIS — G62.9 NEUROPATHY: ICD-10-CM

## 2025-04-07 PROCEDURE — 1157F ADVNC CARE PLAN IN RCRD: CPT | Performed by: STUDENT IN AN ORGANIZED HEALTH CARE EDUCATION/TRAINING PROGRAM

## 2025-04-07 PROCEDURE — 99214 OFFICE O/P EST MOD 30 MIN: CPT | Performed by: STUDENT IN AN ORGANIZED HEALTH CARE EDUCATION/TRAINING PROGRAM

## 2025-04-07 RX ORDER — DICLOFENAC SODIUM 10 MG/G
4 GEL TOPICAL 2 TIMES DAILY PRN
Qty: 450 G | Refills: 0 | Status: SHIPPED | OUTPATIENT
Start: 2025-04-07 | End: 2025-06-06

## 2025-04-07 NOTE — TELEPHONE ENCOUNTER
Has concerns with her Father in Law and if he is eligible for Hospice.  She states he lives by himself and needs care.  Is this something that comes from you?  Or what resources do you have to get him set up ?   Please Advise   Dilma can be reached at   [phone]

## 2025-04-07 NOTE — PROGRESS NOTES
Chief Complaint   Patient presents with    Right Ankle - Pain, New Patient Visit     Xrays done 2/3/2025 at [facility]    Left Ankle - Pain, New Patient Visit     Xrays done 2/17/2025       HPI  79-year-old male presents today for repeat evaluation of bilateral ankles.  Patient continues to endorse sharp bilateral ankle and foot pain.   Some days worse than others.  Has had a hard time sleeping due to pain and discomfort.  Last visit we did provide him with bilateral ankle braces as well as a Medrol Dosepak.  Patient states that the ankle braces made his pain worse therefore discontinued wearing them.  Medrol Dosepak provided minimal if any relief from his symptoms.  The only thing lately that has been helping relieve this pain has been topical pain relief cream and Vicks which he applies every day.  States that symptoms about his ankle have somewhat resolved however is still having severe pain about the plantar surface of his feet.  Continues to have intermittent numbness and tingling about his toes as well.    Past Medical History:   Diagnosis Date    Neuropathy     Osteoarthritis     Parkinson disease (Multi)     RLS (restless legs syndrome)     Scoliosis     Seizure (Multi) 2020    1 episode, unknown cause    Spinal stenosis     Thromboembolism (Multi)     son denies - no history of blood clots    Urinary tract infection     admitted 7/2024 for complicated UTI and hematuria requiring cystoscopy with cauterization.       Past Surgical History:   Procedure Laterality Date    APPENDECTOMY      COLONOSCOPY      CYSTOSCOPY      with cauterization    HIP FRACTURE SURGERY  2022    SPINE SURGERY      TOTAL KNEE ARTHROPLASTY Right 2022    TOTAL KNEE ARTHROPLASTY Left         No Known Allergies     Physical exam    General: Alert and oriented to place, person, and time.  No acute distress and breathing comfortably; pleasant and cooperative with the examination.  HEENT: Head is normocephalic and atraumatic.  Neck: Supple,  no visible swelling.  Cardiovascular: Good perfusion to the affected extremity.  Lungs: No audible wheezing or labored breathing.  Abdomen: Nondistended  HEME/Lymph : No visible abnormalities bilateral lower extremity    Extremity:  Focused examination bilateral ankles: Mild erythema about both ankles moderate edema about both ankles dorsiflexion EHL plantarflexion intact no short arc pain.  Mild diffuse tenderness palpation about the ankle.  No overlying skin changes.  Extremities warm and well-perfused neurovascular intact mild pain with inversion and eversion as well.    Diagnostics:  XR ankle left 3+ views    Result Date: 2/17/2025  Interpreted By:  Jonn Red III, STUDY: XR ANKLE LEFT 3+ VIEWS; ;  2/17/2025 2:19 pm   INDICATION: Signs/Symptoms:pain.   ,M25.572 Pain in left ankle and joints of left foot   COMPARISON: None.   ACCESSION NUMBER(S): [id number]   ORDERING CLINICIAN: JONN RED   FINDINGS: Three views left ankle: There is moderate joint space narrowing diffusely about the ankle joint ossicle about the medial gutter consistent with possible loose body there is soft tissue swelling anteriorly about the ankle no acute osseous abnormality no fracture or dislocation appreciated there is enthesophytic change about the insertion point of the Achilles tendon as well as the insertion point of the plantar fascia       No acute osseous abnormality     MACRO: None   Signed by: Jonn Red III 2/17/2025 2:27 PM Dictation workstation:   [id number]    XR tibia fibula right 2 views    Result Date: 2/8/2025  Interpreted By:  Luis Roberts, STUDY: XR TIBIA FIBULA RIGHT 2 VIEWS; ;  2/7/2025 12:08 pm   INDICATION: Signs/Symptoms:pain.   ,M25.571 Pain in right ankle and joints of right foot,G89.29 Other chronic pain   COMPARISON: None.   ACCESSION NUMBER(S): [id number]   ORDERING CLINICIAN: JONN RED   FINDINGS: Right tibia-fibula, two views   Mildly displaced avulsion fracture at the medial  malleolus better seen on dedicated views of the ankle. No acute fracture or dislocation otherwise. Total knee arthroplasty present. Soft tissue edema.           Mild displaced avulsion fracture at the medial malleolus better seen on dedicated views of the ankle. Right total knee arthroplasty without acute abnormality.   MACRO: None   Signed by: Luis Roberts 2/8/2025 9:22 AM Dictation workstation:   [id number]    XR ankle right 3+ views    Result Date: 2/8/2025  Interpreted By:  Luis Roberts, STUDY: XR ANKLE RIGHT 3+ VIEWS; ;  2/7/2025 12:08 pm   INDICATION: Signs/Symptoms:pain.   ,M25.571 Pain in right ankle and joints of right foot,G89.29 Other chronic pain   COMPARISON: None.   ACCESSION NUMBER(S): [id number]   ORDERING CLINICIAN: JONN RED   FINDINGS: Right ankle, three views   Small avulsion fracture at the tip of the medial malleolus. There is bimalleolar soft tissue edema. No other fracture seen. No dislocation. Small Achilles and small plantar calcaneal enthesophyte. Moderate the midfoot osteoarthritis present         Small avulsion fracture at the tip of the medial malleolus. Bimalleolar soft tissue edema.   MACRO: None   Signed by: Luis Roberts 2/8/2025 9:21 AM Dictation workstation:   [id number]    Procedure:  Procedures    Assessment:  79-year-old male with moderate to severe bilateral ankle arthritis, neuropathy    Treatment plan:  The natural history of the condition and its associated treatment alternatives including surgical and nonsurgical options were discussed with the patient at length.  Patient elected to proceed with prescription for Voltaren  Discussed activities to avoid as well as importance of using pain as a guide  All of the patient's questions were answered.    Blayne Gates PA-C    79-year-old male with history of known bilateral severe ankle arthritis.  Presents today for repeat evaluation and follow-up.  Presently have any pain in his ankles today endorses primarily  pain about bilateral toes diffusely.  Has had minimal per family he is burning in both of his toes and feet this has been present for quite some time.  Vicks rubs does seem to provide him significant relief with this.  Patient has extensive past medical history.  Unable to tolerate many medicines given side effect profiles.  Examination as seen above.  Assessment and plan 79-year-old male with bilateral ankle arthritis, neuropathy bilateral feet.  I do think most the pain is primarily coming from neuropathic etiology at this point in time.  Did discuss multiple medications to help alleviate this however there is concerns for interaction with current medicines that he is on.  Hopes of minimizing this patient does wish to proceed with Voltaren cream to see if this provides him any relief.  I will also provide a referral to pain management to see if they have any other recommendations regarding patient's current pain.  Patient not currently a surgical candidate given Parkinson's, extensive past medical history.  I do think this is nonsurgical problem.    This is a severe exacerbation of a chronic problem.

## 2025-04-08 ENCOUNTER — PATIENT OUTREACH (OUTPATIENT)
Dept: PRIMARY CARE | Facility: CLINIC | Age: 80
End: 2025-04-08
Payer: MEDICARE

## 2025-04-08 NOTE — PROGRESS NOTES
Spoke with patients Dilma HAYNES.    She states that patients health has been declining and would like to get someone in the home for extra assistance.  She inquired about Hospice care.  Discussed nature of hospice and she felt that this was not a proper fit.   Provided possible options such as friend, family, or senior center for companionship at home.    She declined need for CCM services.     Recommended providing medical POA papers to be scanned into the chart.

## 2025-04-09 ENCOUNTER — TELEPHONE (OUTPATIENT)
Dept: PAIN MEDICINE | Facility: CLINIC | Age: 80
End: 2025-04-09
Payer: MEDICARE

## 2025-04-16 ENCOUNTER — TELEPHONE (OUTPATIENT)
Dept: PAIN MEDICINE | Facility: CLINIC | Age: 80
End: 2025-04-16
Payer: MEDICARE

## 2025-05-20 ENCOUNTER — APPOINTMENT (OUTPATIENT)
Dept: NEUROLOGY | Facility: CLINIC | Age: 80
End: 2025-05-20
Payer: MEDICARE

## 2025-05-22 ENCOUNTER — PATIENT OUTREACH (OUTPATIENT)
Dept: PRIMARY CARE | Facility: CLINIC | Age: 80
End: 2025-05-22
Payer: MEDICARE

## 2025-05-22 DIAGNOSIS — Z00.00 WELL ADULT EXAM: ICD-10-CM

## 2025-06-19 ENCOUNTER — APPOINTMENT (OUTPATIENT)
Dept: DERMATOLOGY | Facility: CLINIC | Age: 80
End: 2025-06-19
Payer: MEDICARE

## 2025-06-19 DIAGNOSIS — D48.5 NEOPLASM OF UNCERTAIN BEHAVIOR OF SKIN: ICD-10-CM

## 2025-06-19 DIAGNOSIS — L57.8 PHOTOAGING OF SKIN: Primary | ICD-10-CM

## 2025-06-19 PROCEDURE — 1159F MED LIST DOCD IN RCRD: CPT | Performed by: DERMATOLOGY

## 2025-06-19 PROCEDURE — 1036F TOBACCO NON-USER: CPT | Performed by: DERMATOLOGY

## 2025-06-19 PROCEDURE — 99202 OFFICE O/P NEW SF 15 MIN: CPT | Performed by: DERMATOLOGY

## 2025-06-19 PROCEDURE — 11102 TANGNTL BX SKIN SINGLE LES: CPT | Performed by: DERMATOLOGY

## 2025-06-19 NOTE — PROGRESS NOTES
Subjective     Marco Antonio Merlos Jr. is a 80 y.o. male who presents for the following: Suspicious Skin Lesion (Pt here for lesion on right neck. Present x 7-8 months. No hx of skin cancer.).          Review of Systems:  No other skin or systemic complaints other than what is documented elsewhere in the note.    The following portions of the chart were reviewed this encounter and updated as appropriate:          Skin Cancer History  Biopsy Log Book  No skin cancers from Specimen Tracking.    Additional History      Specialty Problems          Dermatology Problems    Abrasion        Objective   Well appearing patient in no apparent distress; mood and affect are within normal limits.    A focused skin examination was performed of the face, neck. All findings within normal limits unless otherwise noted below.    Assessment/Plan   Skin Exam  1. PHOTOAGING OF SKIN (2)  Head - Anterior (Face), Neck - Anterior  Mottled pigmentation with telangiectasias and brown reticular macules in sun exposed areas of the body.  The risk of chronic, cumulative sun damage and risk of development of skin cancer was reviewed today.   We reviewed the warning signs of non-melanoma skin cancer and   Please follow up should you notice any new or changing pre-existing skin lesion.  2. NEOPLASM OF UNCERTAIN BEHAVIOR OF SKIN  Right Anterior Neck  1.2cm pink pearly plaque with ulceration    Lesion biopsy  Type of biopsy: tangential    Informed consent: discussed and consent obtained    Timeout: patient name, date of birth, surgical site, and procedure verified    Procedure prep:  Patient was prepped and draped  Anesthesia: the lesion was anesthetized in a standard fashion    Anesthetic:  1% lidocaine w/ epinephrine 1-100,000 local infiltration  Instrument used: DermaBlade    Hemostasis achieved with: aluminum chloride    Outcome: patient tolerated procedure well    Post-procedure details: sterile dressing applied and wound care instructions given     Dressing type: petrolatum and bandage      Staff Communication: Dermatology Local Anesthesia: 1 % Lidocaine / Epinephrine - Amount:  Specimen 1 - Dermatopathology- DERM LAB  Differential Diagnosis: bcc  Check Margins Yes/No?:    Comments:    Dermpath Lab: Routine Histopathology (formalin-fixed tissue)  Lesion concerning for bcc. The need for biopsy for definitive diagnosis recommended. Risks and benefits reviewed, see procedure note.

## 2025-06-19 NOTE — Clinical Note
The risk of chronic, cumulative sun damage and risk of development of skin cancer was reviewed today.   We reviewed the warning signs of non-melanoma skin cancer and   Please follow up should you notice any new or changing pre-existing skin lesion.

## 2025-06-19 NOTE — Clinical Note
Lesion concerning for bcc. The need for biopsy for definitive diagnosis recommended. Risks and benefits reviewed, see procedure note.

## 2025-06-23 LAB
LABORATORY COMMENT REPORT: NORMAL
PATH REPORT.FINAL DX SPEC: NORMAL
PATH REPORT.GROSS SPEC: NORMAL
PATH REPORT.MICROSCOPIC SPEC OTHER STN: NORMAL
PATH REPORT.RELEVANT HX SPEC: NORMAL
PATH REPORT.TOTAL CANCER: NORMAL

## 2025-06-24 DIAGNOSIS — C44.41 BASAL CELL CARCINOMA (BCC) OF RIGHT SIDE OF NECK: Primary | ICD-10-CM

## 2025-06-26 ENCOUNTER — APPOINTMENT (OUTPATIENT)
Dept: NEUROLOGY | Facility: CLINIC | Age: 80
End: 2025-06-26
Payer: MEDICARE

## 2025-07-15 ENCOUNTER — TELEPHONE (OUTPATIENT)
Dept: PRIMARY CARE | Facility: CLINIC | Age: 80
End: 2025-07-15
Payer: MEDICARE

## 2025-07-15 NOTE — TELEPHONE ENCOUNTER
Dilma called asking can you order PT for pt?  really thinks he needs it and is asking would you order?   Dilma can be reached at [phone]

## 2025-07-22 ENCOUNTER — APPOINTMENT (OUTPATIENT)
Dept: DERMATOLOGY | Facility: CLINIC | Age: 80
End: 2025-07-22
Payer: MEDICARE

## 2025-07-22 VITALS — DIASTOLIC BLOOD PRESSURE: 92 MMHG | SYSTOLIC BLOOD PRESSURE: 136 MMHG | HEART RATE: 98 BPM

## 2025-07-22 DIAGNOSIS — C44.41 BASAL CELL CARCINOMA (BCC) OF SKIN OF NECK: ICD-10-CM

## 2025-07-22 PROCEDURE — 17311 MOHS 1 STAGE H/N/HF/G: CPT | Performed by: DERMATOLOGY

## 2025-07-22 PROCEDURE — 13132 CMPLX RPR F/C/C/M/N/AX/G/H/F: CPT | Performed by: DERMATOLOGY

## 2025-07-22 NOTE — PROGRESS NOTES
Office Visit Note  Date: 7/22/2025  Surgeon:  Milad Bryson MD PhD  Office Location: [address] [address]  Dept: [phone]  Dept Fax: [phone]  Referring Provider: Paula Dos Santos, [address], [address] [place] [address]    Yessica Merlos Jr. is a 80 y.o. male who presents for the following: MOHS Surgery    According to the patient, the lesion has been present for approximately greater than 1 year at the time of diagnosis.  The lesion is not causing symptoms.  The lesion has not been treated previously.    The patient does not have a pacemaker / defibrillator.  The patient does not have a heart valve / joint replacement.    The patient is not on blood thinners.  The patient does not have a history of hepatitis B or C.  The patient does not have a history of HIV.  The patient does not have a history of immunosuppression (e.g. organ transplantation, malignancy, medications)    Review of Systems:  No other skin or systemic complaints other than what is documented elsewhere in the note.    MEDICAL HISTORY: clinically relevant history including significant past medical history, medications and allergies was reviewed and documented in Epic.    Objective   Well appearing patient in no apparent distress; mood and affect are within normal limits.  Vital signs: See record.  Noted on the   Right Anterior Neck  Is a 0.9 x 1.2 cm scar      The patient confirmed the identified site.    Discussion:  The nature of the diagnosis was explained. The lesion is a skin cancer.  It has a risk of local growth and distant spread. The condition is associated with sun exposure.  Warning signs of non-melanoma skin cancer discussed. Patient was instructed to perform monthly self skin examination.  We recommended that the patient have regular full skin exams given an increased risk of subsequent skin cancers. The patient was instructed to  use sun protective behaviors including use of broad spectrum sunscreens and sun protective clothing to reduce risk of skin cancers.      Risks, benefits, side effects of Mohs surgery were discussed with patient and the patient voiced understanding.  It was explained that even though the cure rate of Mohs is very high it is not 100%. Risks of surgery including but not limited to bleeding, infection, numbness, nerve damage, and scar were reviewed.  Discussion included wound care requirements, activity restrictions, likely scar outcome and time to heal.     After Mohs surgery, the defect may need to be repaired surgically and the scar may be longer than the original lesion.  Reconstruction options, risks, and benefits were reviewed including second intention healing, linear repair (4-1 ratio was explained), local flaps, skin grafts, cartilage grafts and interpolation flaps (the need for multiple surgeries was explained). Possible outcomes were reviewed including likely scar appearance, failure of flap survival, infection, bleeding and the need for revision surgery.     The pathology was reviewed, the photograph was reviewed, and the referring physician's note was reviewed.    Patient elected for Mohs surgery.     Elvin DAY MA  am scribing for, and in the presence of Milad Bryson MD PhD    IMilad MD, PhD, personally performed the services described in the documentation as scribed by Elvin Michel in my presence, and confirm it is both accurate and complete.

## 2025-07-22 NOTE — PROGRESS NOTES
Mohs Surgery Operative Note    Date of Surgery:  7/22/2025  Surgeon:  Milad Bryson MD PhD  Office Location: [address] [address]  Dept: [phone]  Dept Fax: [phone]  Referring Provider: Paula Dos Santos, [address], [address],  [facility]      Assessment/Plan   Pre-procedure:   Obtained informed consent: written from patient  The surgical site was identified and confirmed with the patient.     Intra-operative:   Audible time out called at : 10:30AM 07/22/25  by: Elvin Michel MA   Verified patient name, birthdate, site, specimen bottle label & requisition.    The planned procedure(s) was again reviewed with the patient. The risks of bleeding, infection, nerve damage and scarring were reviewed. Written authorization was obtained. The patient identity, surgical site, and planned procedure(s) were verified. The provider acted as both surgeon and pathologist.     BASAL CELL CARCINOMA (BCC) OF SKIN OF NECK  Right Anterior Neck  - Mohs surgery    Consent obtained: written    Universal Protocol:  Procedure explained and questions answered to patient or proxy's satisfaction: Yes    Test results available and properly labeled: Yes    Pathology report reviewed: Yes    External notes reviewed: Yes    Photo or diagram used for site identification: Yes    Site/side marked: Yes    Slide independently reviewed by Mohs surgeon: Yes    Immediately prior to procedure a time out was called: Yes    Patient identity confirmed: verbally with patient  Preparation: Patient was prepped and draped in usual sterile fashion      Anticoagulation:  Is the patient taking prescription anticoagulant and/or aspirin prescribed/recommended by a physician? No    Was the anticoagulation regimen changed prior to Mohs? No      Anesthesia:  Anesthesia method: local infiltration  Local anesthetic: lidocaine 1% WITH epi    Procedure Details:  Case ID  Number: [id number]  Biopsy accession number: [id number]  Date of biopsy: 6/19/2025  Frozen section biopsy performed: No    Specimen debulked: Yes (NBCC)    Pre-Op diagnosis: basal cell carcinoma  BCC subtype: nodular  Surgical site (from skin exam): Right Anterior Neck  Pre-operative length (cm): 0.9  Pre-operative width (cm): 1.2  Indications for Mohs surgery: anatomic location where tissue conservation is critical  Previously treated? No      Micrographic Surgery Details:  Post-operative length (cm): 2.5  Post-operative width (cm): 1.5  Number of Mohs stages: 1    Stage 1     Comments: The patient was brought into the operating room and placed in the procedure chair in the appropriate position.  The area positive by previous biopsy was identified and confirmed with the patient. The area of clinically obvious tumor was debulked using a curette and/or scalpel as needed. An incision was made following the Mohs approach through the skin. The specimen was taken to the lab, divided into 2 piece(s) and appropriately chromacoded and processed.                 Tumor features identified on Mohs section: no tumor identified    Depth of defect: subcutaneous fat    Patient tolerance of procedure: tolerated well, no immediate complications    Reconstruction:  Was the defect reconstructed? Yes    Was reconstruction performed by the same Mohs surgeon? Yes    Setting of reconstruction: outpatient office  When was reconstruction performed? same day  Type of reconstruction: linear  Linear reconstruction: complex  Length of linear repair (cm): 3.5  Subcutaneous Layers (Deep Stitches)   Suture size:  3-0  Suture type:  Vicryl  Stitches:  Buried vertical mattress  Fine/surface layer approximation (top stitches)   Epidermal/Superficial suture size:  4-0 (Plain gut)  Stitches: simple running    Hemostasis achieved with: electrodesiccation  Outcome: patient tolerated procedure well with no complications    Post-procedure details: sterile  dressing applied    Dressing type: pressure dressing      Complex Linear Repair - Wide Undermining:  Given the location and size of the defect, it was determined that a complex layered linear closure was required to restore normal anatomy and function. The repair was considered complex because extensive undermining was required and performed. The amount of undermining performed was greater than the maximum width of the defect as measured perpendicular to the closure line along at least one entire edge of the defect. Standing cutaneous cones were removed using Burow's triangles. The wound edges were brought into close approximation with buried vertical mattress sutures. The remainder of the wound was then closed with epidermal top sutures.          The final repair measured 3.5 cm    Wound care was discussed, and the patient was given written post-operative wound care instructions.      The patient will follow up with Milad Bryson MD PhD as needed for any post operative problems or concerns, and will follow up with their primary dermatologist as scheduled.       Elvin DAY MA  am scribing for, and in the presence of Milad Bryson MD PhD    IMilad MD, PhD, personally performed the services described in the documentation as scribed by Elvin Michel in my presence, and confirm it is both accurate and complete.

## 2025-07-24 ENCOUNTER — APPOINTMENT (OUTPATIENT)
Dept: PRIMARY CARE | Facility: CLINIC | Age: 80
End: 2025-07-24
Payer: MEDICARE

## 2025-07-29 ENCOUNTER — APPOINTMENT (OUTPATIENT)
Dept: DERMATOLOGY | Facility: CLINIC | Age: 80
End: 2025-07-29
Payer: MEDICARE

## 2025-07-31 ENCOUNTER — APPOINTMENT (OUTPATIENT)
Dept: NEUROLOGY | Facility: CLINIC | Age: 80
End: 2025-07-31
Payer: MEDICARE

## (undated) DEVICE — BAG, DRAINAGE, ANTI-REFLUX CHAMBER, 2000ML

## (undated) DEVICE — Device

## (undated) DEVICE — CATHETER, URETHRAL, FOLEY, 3 WAY, BARDEX IC, 22 FR, 30 CC, SILVER LATEX

## (undated) DEVICE — TUBING, SUCTION, 6MM X 10, CLEAN N-COND

## (undated) DEVICE — IRRIGATION SET, CYSTOSCOPY, TURP, Y, CONTINUOUS, 81 IN

## (undated) DEVICE — CATHETER, LASER URETERAL, 7.1FR, 40CM

## (undated) DEVICE — SYRINGE, 60 CC, IRRIGATION, PISTON, CATH TIP, W/LUER ADAPTER,DISP

## (undated) DEVICE — EVACUATOR, BLADDER, ELLIK, PLASTIC

## (undated) DEVICE — TRAY, SKIN SCRUB, WET PREP, WITH 4 COMPARMENT

## (undated) DEVICE — SOLUTION, SODIUM CHLORIDE 0.9%, 3000ML, BAG

## (undated) DEVICE — TRAY, MINOR, SINGLE BASIN, STERILE

## (undated) DEVICE — EVACUATOR, UROVAC

## (undated) DEVICE — HOLSTER, ELECTROSURGERY ACCESSORY, STERILE

## (undated) DEVICE — SYRINGE, 50 CC, LUER LOCK

## (undated) DEVICE — TUBING, CLEAR N-COND, 5MM X 10, LF

## (undated) DEVICE — SOLUTION, IRRIGATION, STERILE WATER, 1000 ML, POUR BOTTLE

## (undated) DEVICE — GLOVE, SURGICAL, PROTEXIS PI BLUE W/NEUTHERA, 8.0, PF, LF

## (undated) DEVICE — TUBING, MORCELLATOR PUMP, DISPOSABLE

## (undated) DEVICE — TOWEL, SURGICAL, NEURO, O/R, 16 X 26, BLUE, STERILE

## (undated) DEVICE — COLLECTION BAG, FLUID, NON-STERILE

## (undated) DEVICE — PLUG, CATHETER

## (undated) DEVICE — BLADE, ROTATION MORCELLATOR, 4.8MM X 385MM, PIRHANA, DISPOSABLE

## (undated) DEVICE — CORD, MONOPOLARD, 10FT, DISP

## (undated) DEVICE — ELECTRODE, HF, ESG PLASMA, OVAL BUTTON

## (undated) DEVICE — GOWN, SURGICAL, ROYAL SILK, XXL, STERILE

## (undated) DEVICE — DRESSING, NON-ADHERENT, TELFA, OUCHLESS, 3 X 8 IN, STERILE

## (undated) DEVICE — DRAINBAG, 15.5 X 31, 2600/2800 UROVIEW, STERILE

## (undated) DEVICE — SOLUTION, IRRIGATION, SODIUM CHLORIDE 0.9%, 1000 ML, POUR BOTTLE